# Patient Record
Sex: MALE | Race: BLACK OR AFRICAN AMERICAN | NOT HISPANIC OR LATINO | URBAN - METROPOLITAN AREA
[De-identification: names, ages, dates, MRNs, and addresses within clinical notes are randomized per-mention and may not be internally consistent; named-entity substitution may affect disease eponyms.]

---

## 2021-05-29 ENCOUNTER — INPATIENT (INPATIENT)
Facility: HOSPITAL | Age: 66
LOS: 0 days | Discharge: HOME | End: 2021-05-30
Attending: HOSPITALIST | Admitting: HOSPITALIST
Payer: COMMERCIAL

## 2021-05-29 VITALS
OXYGEN SATURATION: 98 % | WEIGHT: 199.96 LBS | HEIGHT: 69 IN | HEART RATE: 105 BPM | DIASTOLIC BLOOD PRESSURE: 121 MMHG | RESPIRATION RATE: 18 BRPM | SYSTOLIC BLOOD PRESSURE: 257 MMHG | TEMPERATURE: 98 F

## 2021-05-29 LAB
ALBUMIN SERPL ELPH-MCNC: 3.3 G/DL — SIGNIFICANT CHANGE UP (ref 3.3–5)
ALP SERPL-CCNC: 114 U/L — SIGNIFICANT CHANGE UP (ref 40–120)
ALT FLD-CCNC: 30 U/L — SIGNIFICANT CHANGE UP (ref 12–78)
ANION GAP SERPL CALC-SCNC: 4 MMOL/L — LOW (ref 5–17)
APPEARANCE UR: CLEAR — SIGNIFICANT CHANGE UP
AST SERPL-CCNC: 43 U/L — HIGH (ref 15–37)
BACTERIA # UR AUTO: ABNORMAL
BASOPHILS # BLD AUTO: 0.03 K/UL — SIGNIFICANT CHANGE UP (ref 0–0.2)
BASOPHILS NFR BLD AUTO: 0.5 % — SIGNIFICANT CHANGE UP (ref 0–2)
BILIRUB SERPL-MCNC: 0.4 MG/DL — SIGNIFICANT CHANGE UP (ref 0.2–1.2)
BILIRUB UR-MCNC: NEGATIVE — SIGNIFICANT CHANGE UP
BUN SERPL-MCNC: 43 MG/DL — HIGH (ref 7–23)
CALCIUM SERPL-MCNC: 8.3 MG/DL — LOW (ref 8.5–10.1)
CHLORIDE SERPL-SCNC: 105 MMOL/L — SIGNIFICANT CHANGE UP (ref 96–108)
CO2 SERPL-SCNC: 30 MMOL/L — SIGNIFICANT CHANGE UP (ref 22–31)
COLOR SPEC: YELLOW — SIGNIFICANT CHANGE UP
CREAT ?TM UR-MCNC: 49 MG/DL — SIGNIFICANT CHANGE UP
CREAT SERPL-MCNC: 3.3 MG/DL — HIGH (ref 0.5–1.3)
CREATININE, URINE RESULT: 47 MG/DL — SIGNIFICANT CHANGE UP
DIFF PNL FLD: ABNORMAL
EOSINOPHIL # BLD AUTO: 0.37 K/UL — SIGNIFICANT CHANGE UP (ref 0–0.5)
EOSINOPHIL NFR BLD AUTO: 6.7 % — HIGH (ref 0–6)
EPI CELLS # UR: SIGNIFICANT CHANGE UP
FLUAV AG NPH QL: SIGNIFICANT CHANGE UP
FLUBV AG NPH QL: SIGNIFICANT CHANGE UP
GLUCOSE BLDC GLUCOMTR-MCNC: 115 MG/DL — HIGH (ref 70–99)
GLUCOSE BLDC GLUCOMTR-MCNC: 131 MG/DL — HIGH (ref 70–99)
GLUCOSE BLDC GLUCOMTR-MCNC: 160 MG/DL — HIGH (ref 70–99)
GLUCOSE BLDC GLUCOMTR-MCNC: 207 MG/DL — HIGH (ref 70–99)
GLUCOSE BLDC GLUCOMTR-MCNC: 246 MG/DL — HIGH (ref 70–99)
GLUCOSE SERPL-MCNC: 268 MG/DL — HIGH (ref 70–99)
GLUCOSE UR QL: NEGATIVE MG/DL — SIGNIFICANT CHANGE UP
HCT VFR BLD CALC: 32.2 % — LOW (ref 39–50)
HGB BLD-MCNC: 10.6 G/DL — LOW (ref 13–17)
IMM GRANULOCYTES NFR BLD AUTO: 0.4 % — SIGNIFICANT CHANGE UP (ref 0–1.5)
KETONES UR-MCNC: NEGATIVE — SIGNIFICANT CHANGE UP
LEUKOCYTE ESTERASE UR-ACNC: NEGATIVE — SIGNIFICANT CHANGE UP
LIDOCAIN IGE QN: 157 U/L — SIGNIFICANT CHANGE UP (ref 73–393)
LYMPHOCYTES # BLD AUTO: 0.66 K/UL — LOW (ref 1–3.3)
LYMPHOCYTES # BLD AUTO: 12 % — LOW (ref 13–44)
MCHC RBC-ENTMCNC: 29 PG — SIGNIFICANT CHANGE UP (ref 27–34)
MCHC RBC-ENTMCNC: 32.9 GM/DL — SIGNIFICANT CHANGE UP (ref 32–36)
MCV RBC AUTO: 88.2 FL — SIGNIFICANT CHANGE UP (ref 80–100)
MONOCYTES # BLD AUTO: 0.26 K/UL — SIGNIFICANT CHANGE UP (ref 0–0.9)
MONOCYTES NFR BLD AUTO: 4.7 % — SIGNIFICANT CHANGE UP (ref 2–14)
NEUTROPHILS # BLD AUTO: 4.16 K/UL — SIGNIFICANT CHANGE UP (ref 1.8–7.4)
NEUTROPHILS NFR BLD AUTO: 75.7 % — SIGNIFICANT CHANGE UP (ref 43–77)
NITRITE UR-MCNC: NEGATIVE — SIGNIFICANT CHANGE UP
NRBC # BLD: 0 /100 WBCS — SIGNIFICANT CHANGE UP (ref 0–0)
NT-PROBNP SERPL-SCNC: 1005 PG/ML — HIGH (ref 0–125)
PH UR: 6 — SIGNIFICANT CHANGE UP (ref 5–8)
PLATELET # BLD AUTO: 186 K/UL — SIGNIFICANT CHANGE UP (ref 150–400)
POTASSIUM SERPL-MCNC: 4.8 MMOL/L — SIGNIFICANT CHANGE UP (ref 3.5–5.3)
POTASSIUM SERPL-SCNC: 4.8 MMOL/L — SIGNIFICANT CHANGE UP (ref 3.5–5.3)
PROT ?TM UR-MCNC: 104 MG/DL — HIGH (ref 0–12)
PROT ?TM UR-MCNC: 107 MG/DL — HIGH (ref 0–12)
PROT SERPL-MCNC: 6.7 G/DL — SIGNIFICANT CHANGE UP (ref 6–8.3)
PROT SERPL-MCNC: 6.7 G/DL — SIGNIFICANT CHANGE UP (ref 6–8.3)
PROT SERPL-MCNC: 7.7 GM/DL — SIGNIFICANT CHANGE UP (ref 6–8.3)
PROT UR-MCNC: 100 MG/DL
PROT/CREAT UR-RTO: 2.1 RATIO — HIGH (ref 0–0.2)
RBC # BLD: 3.65 M/UL — LOW (ref 4.2–5.8)
RBC # FLD: 12.7 % — SIGNIFICANT CHANGE UP (ref 10.3–14.5)
RBC CASTS # UR COMP ASSIST: ABNORMAL /HPF (ref 0–4)
SARS-COV-2 RNA SPEC QL NAA+PROBE: SIGNIFICANT CHANGE UP
SODIUM SERPL-SCNC: 139 MMOL/L — SIGNIFICANT CHANGE UP (ref 135–145)
SP GR SPEC: 1.01 — SIGNIFICANT CHANGE UP (ref 1.01–1.02)
TROPONIN I SERPL-MCNC: 0.1 NG/ML — HIGH (ref 0.01–0.04)
TROPONIN I SERPL-MCNC: 0.11 NG/ML — HIGH (ref 0.01–0.04)
TROPONIN I SERPL-MCNC: 0.11 NG/ML — HIGH (ref 0.01–0.04)
UROBILINOGEN FLD QL: NEGATIVE MG/DL — SIGNIFICANT CHANGE UP
WBC # BLD: 5.5 K/UL — SIGNIFICANT CHANGE UP (ref 3.8–10.5)
WBC # FLD AUTO: 5.5 K/UL — SIGNIFICANT CHANGE UP (ref 3.8–10.5)
WBC UR QL: SIGNIFICANT CHANGE UP

## 2021-05-29 PROCEDURE — 12345: CPT | Mod: NC

## 2021-05-29 PROCEDURE — 93010 ELECTROCARDIOGRAM REPORT: CPT

## 2021-05-29 PROCEDURE — 71045 X-RAY EXAM CHEST 1 VIEW: CPT | Mod: 26

## 2021-05-29 PROCEDURE — 76775 US EXAM ABDO BACK WALL LIM: CPT | Mod: 26

## 2021-05-29 PROCEDURE — 99291 CRITICAL CARE FIRST HOUR: CPT

## 2021-05-29 PROCEDURE — 93306 TTE W/DOPPLER COMPLETE: CPT | Mod: 26

## 2021-05-29 PROCEDURE — 99223 1ST HOSP IP/OBS HIGH 75: CPT

## 2021-05-29 RX ORDER — GLUCAGON INJECTION, SOLUTION 0.5 MG/.1ML
1 INJECTION, SOLUTION SUBCUTANEOUS ONCE
Refills: 0 | Status: DISCONTINUED | OUTPATIENT
Start: 2021-05-29 | End: 2021-05-30

## 2021-05-29 RX ORDER — AMLODIPINE BESYLATE 2.5 MG/1
10 TABLET ORAL DAILY
Refills: 0 | Status: DISCONTINUED | OUTPATIENT
Start: 2021-05-29 | End: 2021-05-30

## 2021-05-29 RX ORDER — INSULIN GLARGINE 100 [IU]/ML
10 INJECTION, SOLUTION SUBCUTANEOUS AT BEDTIME
Refills: 0 | Status: DISCONTINUED | OUTPATIENT
Start: 2021-05-29 | End: 2021-05-30

## 2021-05-29 RX ORDER — INSULIN LISPRO 100/ML
VIAL (ML) SUBCUTANEOUS AT BEDTIME
Refills: 0 | Status: DISCONTINUED | OUTPATIENT
Start: 2021-05-29 | End: 2021-05-30

## 2021-05-29 RX ORDER — HEPARIN SODIUM 5000 [USP'U]/ML
5000 INJECTION INTRAVENOUS; SUBCUTANEOUS EVERY 8 HOURS
Refills: 0 | Status: DISCONTINUED | OUTPATIENT
Start: 2021-05-29 | End: 2021-05-30

## 2021-05-29 RX ORDER — DEXTROSE 50 % IN WATER 50 %
25 SYRINGE (ML) INTRAVENOUS ONCE
Refills: 0 | Status: DISCONTINUED | OUTPATIENT
Start: 2021-05-29 | End: 2021-05-30

## 2021-05-29 RX ORDER — DEXTROSE 50 % IN WATER 50 %
15 SYRINGE (ML) INTRAVENOUS ONCE
Refills: 0 | Status: DISCONTINUED | OUTPATIENT
Start: 2021-05-29 | End: 2021-05-30

## 2021-05-29 RX ORDER — LABETALOL HCL 100 MG
10 TABLET ORAL ONCE
Refills: 0 | Status: COMPLETED | OUTPATIENT
Start: 2021-05-29 | End: 2021-05-29

## 2021-05-29 RX ORDER — INSULIN LISPRO 100/ML
VIAL (ML) SUBCUTANEOUS
Refills: 0 | Status: DISCONTINUED | OUTPATIENT
Start: 2021-05-29 | End: 2021-05-30

## 2021-05-29 RX ORDER — LOSARTAN POTASSIUM 100 MG/1
100 TABLET, FILM COATED ORAL DAILY
Refills: 0 | Status: DISCONTINUED | OUTPATIENT
Start: 2021-05-29 | End: 2021-05-29

## 2021-05-29 RX ORDER — HYDRALAZINE HCL 50 MG
10 TABLET ORAL ONCE
Refills: 0 | Status: COMPLETED | OUTPATIENT
Start: 2021-05-29 | End: 2021-05-29

## 2021-05-29 RX ORDER — DEXTROSE 50 % IN WATER 50 %
12.5 SYRINGE (ML) INTRAVENOUS ONCE
Refills: 0 | Status: DISCONTINUED | OUTPATIENT
Start: 2021-05-29 | End: 2021-05-30

## 2021-05-29 RX ORDER — LOSARTAN POTASSIUM 100 MG/1
300 TABLET, FILM COATED ORAL ONCE
Refills: 0 | Status: DISCONTINUED | OUTPATIENT
Start: 2021-05-29 | End: 2021-05-29

## 2021-05-29 RX ORDER — SODIUM CHLORIDE 9 MG/ML
1000 INJECTION, SOLUTION INTRAVENOUS
Refills: 0 | Status: DISCONTINUED | OUTPATIENT
Start: 2021-05-29 | End: 2021-05-30

## 2021-05-29 RX ORDER — LABETALOL HCL 100 MG
100 TABLET ORAL ONCE
Refills: 0 | Status: COMPLETED | OUTPATIENT
Start: 2021-05-29 | End: 2021-05-29

## 2021-05-29 RX ORDER — HYDRALAZINE HCL 50 MG
25 TABLET ORAL THREE TIMES A DAY
Refills: 0 | Status: DISCONTINUED | OUTPATIENT
Start: 2021-05-29 | End: 2021-05-30

## 2021-05-29 RX ORDER — FUROSEMIDE 40 MG
40 TABLET ORAL ONCE
Refills: 0 | Status: COMPLETED | OUTPATIENT
Start: 2021-05-29 | End: 2021-05-29

## 2021-05-29 RX ORDER — HYDROCHLOROTHIAZIDE 25 MG
12.5 TABLET ORAL ONCE
Refills: 0 | Status: COMPLETED | OUTPATIENT
Start: 2021-05-29 | End: 2021-05-29

## 2021-05-29 RX ADMIN — Medication 0.2 MILLIGRAM(S): at 04:07

## 2021-05-29 RX ADMIN — Medication 10 MILLIGRAM(S): at 02:59

## 2021-05-29 RX ADMIN — Medication 100 MILLIGRAM(S): at 17:23

## 2021-05-29 RX ADMIN — HEPARIN SODIUM 5000 UNIT(S): 5000 INJECTION INTRAVENOUS; SUBCUTANEOUS at 13:07

## 2021-05-29 RX ADMIN — Medication 10 MILLIGRAM(S): at 02:00

## 2021-05-29 RX ADMIN — Medication 10 MILLIGRAM(S): at 05:42

## 2021-05-29 RX ADMIN — AMLODIPINE BESYLATE 10 MILLIGRAM(S): 2.5 TABLET ORAL at 05:42

## 2021-05-29 RX ADMIN — HEPARIN SODIUM 5000 UNIT(S): 5000 INJECTION INTRAVENOUS; SUBCUTANEOUS at 21:20

## 2021-05-29 RX ADMIN — INSULIN GLARGINE 10 UNIT(S): 100 INJECTION, SOLUTION SUBCUTANEOUS at 21:21

## 2021-05-29 RX ADMIN — Medication 25 MILLIGRAM(S): at 21:20

## 2021-05-29 RX ADMIN — Medication 12.5 MILLIGRAM(S): at 02:33

## 2021-05-29 RX ADMIN — Medication 40 MILLIGRAM(S): at 03:01

## 2021-05-29 RX ADMIN — HEPARIN SODIUM 5000 UNIT(S): 5000 INJECTION INTRAVENOUS; SUBCUTANEOUS at 05:42

## 2021-05-29 NOTE — ED ADULT NURSE NOTE - NSIMPLEMENTINTERV_GEN_ALL_ED
Implemented All Universal Safety Interventions:  [place] to call system. Call bell, personal items and telephone within reach. Instruct patient to call for assistance. Room bathroom lighting operational. Non-slip footwear when patient is off stretcher. Physically safe environment: no spills, clutter or unnecessary equipment. Stretcher in lowest position, wheels locked, appropriate side rails in place.

## 2021-05-29 NOTE — ED ADULT NURSE NOTE - OBJECTIVE STATEMENT
Patient presents in ED complaining of high blood pressure intermittent headaches, low blood sugar. HX of taking first Covid shot x 1 week.

## 2021-05-29 NOTE — ED ADULT TRIAGE NOTE - CHIEF COMPLAINT QUOTE
As per EMS pt complained of low blood sugar, stating it was 55. Took BP at scene at noted systolic BP was 270 and 259. Pt denies headache, dizziness and blurred vision.

## 2021-05-29 NOTE — ED PROVIDER NOTE - CARE PLAN
Principal Discharge DX:	Hypertension, unspecified type   Principal Discharge DX:	Hypertension, unspecified type  Secondary Diagnosis:	MIESHA (acute kidney injury)

## 2021-05-29 NOTE — CONSULT NOTE ADULT - SUBJECTIVE AND OBJECTIVE BOX
HPI:    Patient is a 65y old  Male who presented to ED last night for management of hypoglycemia. Found to be in hypertensive urgency, .  In addition. initial BMP with Cr of 3.3. He denies CKD hx. Pos long standing diabetes and HTN. No NSAIDs. Denies difficulty voiding.   Of note, has not had blood work for over a year.         PAST MEDICAL & SURGICAL HISTORY:  Diabetes    HTN (hypertension)        Social Hx: not a smoker    FAMILY HISTORY: DM, HTN      Allergies    No Known Allergies            MEDICATIONS  (STANDING):  amLODIPine   Tablet 10 milliGRAM(s) Oral daily  dextrose 40% Gel 15 Gram(s) Oral once  dextrose 5%. 1000 milliLiter(s) (50 mL/Hr) IV Continuous <Continuous>  dextrose 5%. 1000 milliLiter(s) (100 mL/Hr) IV Continuous <Continuous>  dextrose 50% Injectable 25 Gram(s) IV Push once  dextrose 50% Injectable 12.5 Gram(s) IV Push once  dextrose 50% Injectable 25 Gram(s) IV Push once  glucagon  Injectable 1 milliGRAM(s) IntraMuscular once  heparin   Injectable 5000 Unit(s) SubCutaneous every 8 hours  insulin glargine Injectable (LANTUS) 10 Unit(s) SubCutaneous at bedtime  insulin lispro (ADMELOG) corrective regimen sliding scale   SubCutaneous three times a day before meals  insulin lispro (ADMELOG) corrective regimen sliding scale   SubCutaneous at bedtime    MEDICATIONS  (PRN):      Daily Height in cm: 154.68 (29 May 2021 05:17)    Daily Weight in k.4 (29 May 2021 05:17)    Drug Dosing Weight  Height (cm): 154.7 (29 May 2021 05:17)  Weight (kg): 92.4 (29 May 2021 05:17)  BMI (kg/m2): 38.6 (29 May 2021 05:17)  BSA (m2): 1.9 (29 May 2021 05:17)      REVIEW OF SYSTEMS:    Per HPI            I&O's Detail        PHYSICAL EXAM:    GENERAL: NAD  HEAD:  Atraumatic, normocephalic  EYES: EOMI, PERRLA. Conjunctiva and sclera clear  ENMT: moist mucous membranes.   NECK: Supple. No increase in JVP  NERVOUS SYSTEM:  Alert & Oriented X3. Motor Strength 5/5 B/L upper and lower extremities; DTRs 2+ intact and symmetric  CHEST/LUNG: Clear to auscultation bilaterally  HEART: Regular rate and rhythm. No murmurs, rubs, or gallops  ABDOMEN: Soft. No diastolic bruit  EXTREMITIES:  no edema      LABS:  CBC Full  -  ( 29 May 2021 01:37 )  WBC Count : 5.50 K/uL  RBC Count : 3.65 M/uL  Hemoglobin : 10.6 g/dL  Hematocrit : 32.2 %  Platelet Count - Automated : 186 K/uL  Mean Cell Volume : 88.2 fl  Mean Cell Hemoglobin : 29.0 pg  Mean Cell Hemoglobin Concentration : 32.9 gm/dL  Auto Neutrophil # : 4.16 K/uL  Auto Lymphocyte # : 0.66 K/uL  Auto Monocyte # : 0.26 K/uL  Auto Eosinophil # : 0.37 K/uL  Auto Basophil # : 0.03 K/uL  Auto Neutrophil % : 75.7 %  Auto Lymphocyte % : 12.0 %  Auto Monocyte % : 4.7 %  Auto Eosinophil % : 6.7 %  Auto Basophil % : 0.5 %        139  |  105  |  43<H>  ----------------------------<  268<H>  4.8   |  30  |  3.30<H>    Ca    8.3<L>      29 May 2021 01:37    TPro  7.7  /  Alb  3.3  /  TBili  0.4  /  DBili  x   /  AST  43<H>  /  ALT  30  /  AlkPhos  114  05-    CAPILLARY BLOOD GLUCOSE      POCT Blood Glucose.: 207 mg/dL (29 May 2021 07:54)        CARDIAC MARKERS ( 29 May 2021 01:37 )  .097 ng/mL / x     / x     / x     / x              Impression:  * Elevated Cr. ? MIESHA vs CKD  * Hypertensive urgency. Improved  * Brittle diabetic    Recommendations:   * UA, spot urine protein:cr  * Renal US, doppler, Rad/renin  * SPEP, UPEP  * If active urinary sediment, will obtain GN serologies  * Goal -170 next 48h    Thank you!

## 2021-05-29 NOTE — ED PROVIDER NOTE - OBJECTIVE STATEMENT
Pt is a 64 yo gentleman with a pmhx of NIDM, HTN who presents to the ED with hypertension. He felt nauseated, and checked his glucose which was low, so he drank juice, and then checked his bp which was elevated. Has been compliant with his bp medication. Just saw his PMD 2 days ago, and was switched from metoprolol to irbesartan 300 and hctz. No chest pain, no sob, no symptoms now.

## 2021-05-29 NOTE — H&P ADULT - ASSESSMENT
65 year old man with PMH HTN, DM2 presents to ED with HTN urgency.  States he felt nauseated at home and his BG was low so he ate and it improved.  He currently denies chest pain, SOB, palpitations, fever, chills, nausea, vomiting, diarrhea.  He seems surprised with his current work up as he saw his PMD 2 years ago and his kidney function and blood pressure were "normal".    HTN urgency:  Given labetalol and Lasix in ED with improvement in BP  Mild troponin and BNP elevation  Keep on tele  trend troponin  TTE  Cardiology consult if TTE abnormal    Abnormal renal fxn:  No baseline for comparison  Will get renal US  Send Pro/Cr ratio  Nephrology consult  Avoid nephrotoxic meds    DM2:  On Metformin and Glipizide at home; would stop Metformin given renal function  Sliding scale  Lantus 10 units SC QHS  CHO diet  Check A1C, cholesterol    Preventive:  Heparin SC q8h for DVT prophylaxis     65 year old man with PMH HTN, DM2 presents to ED with HTN urgency.  States he felt nauseated at home and his BG was low so he ate and it improved.  He currently denies chest pain, SOB, palpitations, fever, chills, nausea, vomiting, diarrhea.  He seems surprised with his current work up as he saw his PMD 2 years ago and his kidney function and blood pressure were "normal".    HTN urgency:  Given labetalol and Lasix in ED with improvement in BP  Mild troponin and BNP elevation  Keep on tele  trend troponin  TTE  Cardiology consult if TTE abnormal  On ARB/HCTZ at home; would discontinue and start Norvasc, might need second medication if BP still uncontrolled.    Abnormal renal fxn:  No baseline for comparison  Will get renal US  Send Pro/Cr ratio  Nephrology consult  Avoid nephrotoxic meds    DM2:  On Metformin and Glipizide at home; would stop Metformin given renal function  Sliding scale  Lantus 10 units SC QHS  CHO diet  Check A1C, cholesterol    Preventive:  Heparin SC q8h for DVT prophylaxis

## 2021-05-29 NOTE — H&P ADULT - NSHPPHYSICALEXAM_GEN_ALL_CORE
GENERAL: NAD, well-groomed, well-developed  HEAD:  Atraumatic, Normocephalic  EYES: EOMI, PERRLA, conjunctiva and sclera clear  ENMT: No tonsillar erythema, exudates, or enlargement; Moist mucous membranes, No lesions  NECK: Supple, No JVD, Normal thyroid  NERVOUS SYSTEM:  Alert & Oriented X3, Good concentration  CHEST/LUNG: Clear to percussion bilaterally; No rales, rhonchi, wheezing, or rubs  HEART: Regular rate and rhythm; No murmurs, rubs, or gallops  ABDOMEN: Soft, Nontender, Nondistended; Bowel sounds present  EXTREMITIES: No clubbing, cyanosis, or edema  SKIN: no rashes or lesions  PSYCH: normal affect and behavior

## 2021-05-29 NOTE — CHART NOTE - NSCHARTNOTEFT_GEN_A_CORE
Patient seen and examined will get echo   consider cardiology   feel that tropin are demand and influenced by renal failure

## 2021-05-29 NOTE — H&P ADULT - NSHPLABSRESULTS_GEN_ALL_CORE
Vital Signs Last 24 Hrs  T(C): 36.8 (29 May 2021 00:40), Max: 36.8 (29 May 2021 00:40)  T(F): 98.2 (29 May 2021 00:40), Max: 98.2 (29 May 2021 00:40)  HR: 88 (29 May 2021 02:49) (88 - 105)  BP: 196/100 (29 May 2021 02:49) (196/100 - 257/121)  BP(mean): --  RR: 18 (29 May 2021 00:40) (18 - 18)  SpO2: 98% (29 May 2021 00:40) (98% - 98%)          LABS:                        10.6   5.50  )-----------( 186      ( 29 May 2021 01:37 )             32.2     05-29    139  |  105  |  43<H>  ----------------------------<  268<H>  4.8   |  30  |  3.30<H>    Ca    8.3<L>      29 May 2021 01:37    TPro  7.7  /  Alb  3.3  /  TBili  0.4  /  DBili  x   /  AST  43<H>  /  ALT  30  /  AlkPhos  114  05-29

## 2021-05-29 NOTE — ED ADULT TRIAGE NOTE - NS ED NURSE DIRECT TO ROOM YN
Ongoing (interventions implemented as appropriate)  Pt is alert and oriented.   VSS  Pt able to make needs known.  Pt remained afebrile throughout this shift.   Pt remained free of falls this shift.   Pt denies pain this shift.  Plan of care reviewed. Patient verbalizes understanding.   Pt moving/turing independent. Frequent weight shifting encouraged.  Patient sinus rhythm on monitor.   Bed low, side rails up x 2, wheels locked, call light in reach.   Hourly rounding completed.   Will continue to monitor.    No

## 2021-05-29 NOTE — H&P ADULT - HISTORY OF PRESENT ILLNESS
65 year old man with PMH HTN, DM2 presents to ED with HTN urgency.  States he felt nauseated at home and his BG was low so he ate and it improved.  He currently denies chest pain, SOB, palpitations, fever, chills, nausea, vomiting, diarrhea.  He seems surprised with his current work up as he saw his PMD 2 years ago and his kidney function and blood pressure were "normal".    In the ED, BP to the 200s systolic.  Cr 3.30 with no baseline, mild troponin and BNP elevation.

## 2021-05-30 ENCOUNTER — TRANSCRIPTION ENCOUNTER (OUTPATIENT)
Age: 66
End: 2021-05-30

## 2021-05-30 VITALS
HEART RATE: 87 BPM | SYSTOLIC BLOOD PRESSURE: 168 MMHG | RESPIRATION RATE: 18 BRPM | DIASTOLIC BLOOD PRESSURE: 81 MMHG | TEMPERATURE: 98 F | OXYGEN SATURATION: 100 %

## 2021-05-30 LAB
A1C WITH ESTIMATED AVERAGE GLUCOSE RESULT: 6.7 % — HIGH (ref 4–5.6)
ALBUMIN SERPL ELPH-MCNC: 2.9 G/DL — LOW (ref 3.3–5)
ALP SERPL-CCNC: 89 U/L — SIGNIFICANT CHANGE UP (ref 40–120)
ALT FLD-CCNC: 23 U/L — SIGNIFICANT CHANGE UP (ref 12–78)
ANION GAP SERPL CALC-SCNC: 6 MMOL/L — SIGNIFICANT CHANGE UP (ref 5–17)
AST SERPL-CCNC: 20 U/L — SIGNIFICANT CHANGE UP (ref 15–37)
BASOPHILS # BLD AUTO: 0.04 K/UL — SIGNIFICANT CHANGE UP (ref 0–0.2)
BASOPHILS NFR BLD AUTO: 0.9 % — SIGNIFICANT CHANGE UP (ref 0–2)
BILIRUB SERPL-MCNC: 0.6 MG/DL — SIGNIFICANT CHANGE UP (ref 0.2–1.2)
BUN SERPL-MCNC: 40 MG/DL — HIGH (ref 7–23)
CALCIUM SERPL-MCNC: 8.6 MG/DL — SIGNIFICANT CHANGE UP (ref 8.5–10.1)
CHLORIDE SERPL-SCNC: 109 MMOL/L — HIGH (ref 96–108)
CHOLEST SERPL-MCNC: 168 MG/DL — SIGNIFICANT CHANGE UP
CO2 SERPL-SCNC: 26 MMOL/L — SIGNIFICANT CHANGE UP (ref 22–31)
COVID-19 SPIKE DOMAIN AB INTERP: NEGATIVE — SIGNIFICANT CHANGE UP
COVID-19 SPIKE DOMAIN ANTIBODY RESULT: 0.4 U/ML — SIGNIFICANT CHANGE UP
CREAT SERPL-MCNC: 3.01 MG/DL — HIGH (ref 0.5–1.3)
EOSINOPHIL # BLD AUTO: 0.69 K/UL — HIGH (ref 0–0.5)
EOSINOPHIL NFR BLD AUTO: 15.2 % — HIGH (ref 0–6)
ESTIMATED AVERAGE GLUCOSE: 146 MG/DL — HIGH (ref 68–114)
GLUCOSE BLDC GLUCOMTR-MCNC: 106 MG/DL — HIGH (ref 70–99)
GLUCOSE BLDC GLUCOMTR-MCNC: 115 MG/DL — HIGH (ref 70–99)
GLUCOSE BLDC GLUCOMTR-MCNC: 123 MG/DL — HIGH (ref 70–99)
GLUCOSE SERPL-MCNC: 103 MG/DL — HIGH (ref 70–99)
HCT VFR BLD CALC: 32.9 % — LOW (ref 39–50)
HCV AB S/CO SERPL IA: 0.18 S/CO — SIGNIFICANT CHANGE UP (ref 0–0.99)
HCV AB SERPL-IMP: SIGNIFICANT CHANGE UP
HDLC SERPL-MCNC: 57 MG/DL — SIGNIFICANT CHANGE UP
HGB BLD-MCNC: 10.5 G/DL — LOW (ref 13–17)
IMM GRANULOCYTES NFR BLD AUTO: 0.2 % — SIGNIFICANT CHANGE UP (ref 0–1.5)
LIPID PNL WITH DIRECT LDL SERPL: 94 MG/DL — SIGNIFICANT CHANGE UP
LYMPHOCYTES # BLD AUTO: 1.14 K/UL — SIGNIFICANT CHANGE UP (ref 1–3.3)
LYMPHOCYTES # BLD AUTO: 25.2 % — SIGNIFICANT CHANGE UP (ref 13–44)
MAGNESIUM SERPL-MCNC: 2 MG/DL — SIGNIFICANT CHANGE UP (ref 1.6–2.6)
MCHC RBC-ENTMCNC: 28.6 PG — SIGNIFICANT CHANGE UP (ref 27–34)
MCHC RBC-ENTMCNC: 31.9 GM/DL — LOW (ref 32–36)
MCV RBC AUTO: 89.6 FL — SIGNIFICANT CHANGE UP (ref 80–100)
MONOCYTES # BLD AUTO: 0.28 K/UL — SIGNIFICANT CHANGE UP (ref 0–0.9)
MONOCYTES NFR BLD AUTO: 6.2 % — SIGNIFICANT CHANGE UP (ref 2–14)
NEUTROPHILS # BLD AUTO: 2.37 K/UL — SIGNIFICANT CHANGE UP (ref 1.8–7.4)
NEUTROPHILS NFR BLD AUTO: 52.3 % — SIGNIFICANT CHANGE UP (ref 43–77)
NON HDL CHOLESTEROL: 111 MG/DL — SIGNIFICANT CHANGE UP
NRBC # BLD: 0 /100 WBCS — SIGNIFICANT CHANGE UP (ref 0–0)
PHOSPHATE SERPL-MCNC: 4.5 MG/DL — SIGNIFICANT CHANGE UP (ref 2.5–4.5)
PLATELET # BLD AUTO: 164 K/UL — SIGNIFICANT CHANGE UP (ref 150–400)
POTASSIUM SERPL-MCNC: 4 MMOL/L — SIGNIFICANT CHANGE UP (ref 3.5–5.3)
POTASSIUM SERPL-SCNC: 4 MMOL/L — SIGNIFICANT CHANGE UP (ref 3.5–5.3)
PROT SERPL-MCNC: 6.9 GM/DL — SIGNIFICANT CHANGE UP (ref 6–8.3)
RBC # BLD: 3.67 M/UL — LOW (ref 4.2–5.8)
RBC # FLD: 12.9 % — SIGNIFICANT CHANGE UP (ref 10.3–14.5)
SARS-COV-2 IGG+IGM SERPL QL IA: 0.4 U/ML — SIGNIFICANT CHANGE UP
SARS-COV-2 IGG+IGM SERPL QL IA: NEGATIVE — SIGNIFICANT CHANGE UP
SODIUM SERPL-SCNC: 141 MMOL/L — SIGNIFICANT CHANGE UP (ref 135–145)
TRIGL SERPL-MCNC: 82 MG/DL — SIGNIFICANT CHANGE UP
TROPONIN I SERPL-MCNC: 0.1 NG/ML — HIGH (ref 0.01–0.04)
WBC # BLD: 4.53 K/UL — SIGNIFICANT CHANGE UP (ref 3.8–10.5)
WBC # FLD AUTO: 4.53 K/UL — SIGNIFICANT CHANGE UP (ref 3.8–10.5)

## 2021-05-30 PROCEDURE — 93975 VASCULAR STUDY: CPT | Mod: 26

## 2021-05-30 PROCEDURE — 99239 HOSP IP/OBS DSCHRG MGMT >30: CPT

## 2021-05-30 RX ORDER — HYDRALAZINE HCL 50 MG
1 TABLET ORAL
Qty: 90 | Refills: 0
Start: 2021-05-30 | End: 2021-06-28

## 2021-05-30 RX ORDER — IRBESARTAN AND HYDROCHLOROTHIAZIDE 12.5; 3 MG/1; MG/1
1 TABLET ORAL
Qty: 0 | Refills: 0 | DISCHARGE

## 2021-05-30 RX ORDER — AMLODIPINE BESYLATE 2.5 MG/1
1 TABLET ORAL
Qty: 30 | Refills: 0
Start: 2021-05-30 | End: 2021-06-28

## 2021-05-30 RX ADMIN — HEPARIN SODIUM 5000 UNIT(S): 5000 INJECTION INTRAVENOUS; SUBCUTANEOUS at 05:11

## 2021-05-30 RX ADMIN — HEPARIN SODIUM 5000 UNIT(S): 5000 INJECTION INTRAVENOUS; SUBCUTANEOUS at 13:15

## 2021-05-30 RX ADMIN — AMLODIPINE BESYLATE 10 MILLIGRAM(S): 2.5 TABLET ORAL at 05:05

## 2021-05-30 RX ADMIN — Medication 25 MILLIGRAM(S): at 05:05

## 2021-05-30 RX ADMIN — Medication 25 MILLIGRAM(S): at 13:15

## 2021-05-30 NOTE — DISCHARGE NOTE PROVIDER - HOSPITAL COURSE
HPI:  65 year old man with PMH HTN, DM2 presents to ED with HTN urgency.  States he felt nauseated at home and his BG was low so he ate and it improved.  He currently denies chest pain, SOB, palpitations, fever, chills, nausea, vomiting, diarrhea.  He seems surprised with his current work up as he saw his PMD 2 years ago and his kidney function and blood pressure were "normal".    In the ED, BP to the 200s systolic.  Cr 3.30 with no baseline, mild troponin and BNP elevation. (29 May 2021 03:22)    Patient had somne improvement in BP and was placed on Norvasc and Hydralazine     patient was urinating freely and creatnine begain to come down   ECHO was completed     Pacient was discharged home

## 2021-05-30 NOTE — DISCHARGE NOTE PROVIDER - NSDCCPCAREPLAN_GEN_ALL_CORE_FT
PRINCIPAL DISCHARGE DIAGNOSIS  Diagnosis: Hypertension, unspecified type  Assessment and Plan of Treatment: Please see your primary doctor and take your blood pressure medications      SECONDARY DISCHARGE DIAGNOSES  Diagnosis: MIESHA (acute kidney injury)  Assessment and Plan of Treatment: improved will need to follow kidney function

## 2021-05-30 NOTE — PROGRESS NOTE ADULT - SUBJECTIVE AND OBJECTIVE BOX
Subjective: no complaints.       MEDICATIONS  (STANDING):  amLODIPine   Tablet 10 milliGRAM(s) Oral daily  dextrose 40% Gel 15 Gram(s) Oral once  dextrose 5%. 1000 milliLiter(s) (50 mL/Hr) IV Continuous <Continuous>  dextrose 5%. 1000 milliLiter(s) (100 mL/Hr) IV Continuous <Continuous>  dextrose 50% Injectable 25 Gram(s) IV Push once  dextrose 50% Injectable 12.5 Gram(s) IV Push once  dextrose 50% Injectable 25 Gram(s) IV Push once  glucagon  Injectable 1 milliGRAM(s) IntraMuscular once  heparin   Injectable 5000 Unit(s) SubCutaneous every 8 hours  hydrALAZINE 25 milliGRAM(s) Oral three times a day  insulin glargine Injectable (LANTUS) 10 Unit(s) SubCutaneous at bedtime  insulin lispro (ADMELOG) corrective regimen sliding scale   SubCutaneous three times a day before meals  insulin lispro (ADMELOG) corrective regimen sliding scale   SubCutaneous at bedtime    MEDICATIONS  (PRN):          T(C): 36.8 (21 @ 04:54), Max: 36.9 (21 @ 00:00)  HR: 71 (21 @ 07:00) (52 - 83)  BP: 153/74 (21 @ 04:54) (153/74 - 192/93)  RR: 18 (21 @ 04:54) (18 - 18)  SpO2: 100% (21 @ 04:54) (97% - 100%)  Wt(kg): --        I&O's Detail    29 May 2021 07:01  -  30 May 2021 07:00  --------------------------------------------------------  IN:  Total IN: 0 mL    OUT:    Voided (mL): 750 mL  Total OUT: 750 mL    Total NET: -750 mL               PHYSICAL EXAM:    GENERAL: NAD  NECK: Supple, no inc in JVP  CHEST/LUNG: Clear  HEART: S1S2  ABDOMEN: Soft, No diastolic bruit  EXTREMITIES:  no edema  NEURO: no asterixis      LABS:  CBC Full  -  ( 30 May 2021 07:34 )  WBC Count : 4.53 K/uL  RBC Count : 3.67 M/uL  Hemoglobin : 10.5 g/dL  Hematocrit : 32.9 %  Platelet Count - Automated : 164 K/uL  Mean Cell Volume : 89.6 fl  Mean Cell Hemoglobin : 28.6 pg  Mean Cell Hemoglobin Concentration : 31.9 gm/dL  Auto Neutrophil # : 2.37 K/uL  Auto Lymphocyte # : 1.14 K/uL  Auto Monocyte # : 0.28 K/uL  Auto Eosinophil # : 0.69 K/uL  Auto Basophil # : 0.04 K/uL  Auto Neutrophil % : 52.3 %  Auto Lymphocyte % : 25.2 %  Auto Monocyte % : 6.2 %  Auto Eosinophil % : 15.2 %  Auto Basophil % : 0.9 %    05-    141  |  109<H>  |  40<H>  ----------------------------<  103<H>  4.0   |  26  |  3.01<H>    Ca    8.6      30 May 2021 07:34  Phos  4.5     05-  Mg     2.0         TPro  6.9  /  Alb  2.9<L>  /  TBili  0.6  /  DBili  x   /  AST  20  /  ALT  23  /  AlkPhos  89  05-30      Urinalysis Basic - ( 29 May 2021 14:33 )    Color: Yellow / Appearance: Clear / S.010 / pH: x  Gluc: x / Ketone: Negative  / Bili: Negative / Urobili: Negative mg/dL   Blood: x / Protein: 100 mg/dL / Nitrite: Negative   Leuk Esterase: Negative / RBC: 3-5 /HPF / WBC 0-2   Sq Epi: x / Non Sq Epi: Occasional / Bacteria: Occasional          Impression:  * Elevated Cr. ? MIESHA vs CKD  * Hypertensive urgency. Improved  * Brittle diabetic    Recommendations:   * pot urine protein:cr  * Renal arterial doppler, Rad/renin  * SPEP, UPEP, GN serologies  * Goal -160 next 48h

## 2021-05-30 NOTE — DISCHARGE NOTE PROVIDER - NSDCMRMEDTOKEN_GEN_ALL_CORE_FT
amLODIPine 10 mg oral tablet: 1 tab(s) orally once a day  hydrALAZINE 25 mg oral tablet: 1 tab(s) orally 3 times a day  metFORMIN 500 mg oral tablet: 1 tab(s) orally 2 times a day

## 2021-05-30 NOTE — DISCHARGE NOTE NURSING/CASE MANAGEMENT/SOCIAL WORK - PATIENT PORTAL LINK FT
You can access the FollowMyHealth Patient Portal offered by [facility] by registering at the following website: [email or web address]. By joining [employer]’s FollowMyHealth portal, you will also be able to view your health information using other applications (apps) compatible with our system.

## 2021-05-31 LAB
% ALBUMIN: 50.5 % — SIGNIFICANT CHANGE UP
% ALPHA 1: 4.3 % — SIGNIFICANT CHANGE UP
% ALPHA 2: 11.6 % — SIGNIFICANT CHANGE UP
% BETA: 12.1 % — SIGNIFICANT CHANGE UP
% GAMMA: 21.5 % — SIGNIFICANT CHANGE UP
% M SPIKE: SIGNIFICANT CHANGE UP
ALBUMIN SERPL ELPH-MCNC: 3.4 G/DL — LOW (ref 3.6–5.5)
ALBUMIN/GLOB SERPL ELPH: 1 RATIO — SIGNIFICANT CHANGE UP
ALPHA1 GLOB SERPL ELPH-MCNC: 0.3 G/DL — SIGNIFICANT CHANGE UP (ref 0.1–0.4)
ALPHA2 GLOB SERPL ELPH-MCNC: 0.8 G/DL — SIGNIFICANT CHANGE UP (ref 0.5–1)
B-GLOBULIN SERPL ELPH-MCNC: 0.8 G/DL — SIGNIFICANT CHANGE UP (ref 0.5–1)
C3 SERPL-MCNC: 118 MG/DL — SIGNIFICANT CHANGE UP (ref 81–157)
C4 SERPL-MCNC: 28 MG/DL — SIGNIFICANT CHANGE UP (ref 13–39)
GAMMA GLOBULIN: 1.4 G/DL — SIGNIFICANT CHANGE UP (ref 0.6–1.6)
INTERPRETATION SERPL IFE-IMP: SIGNIFICANT CHANGE UP
M-SPIKE: SIGNIFICANT CHANGE UP (ref 0–0)
PROT PATTERN SERPL ELPH-IMP: SIGNIFICANT CHANGE UP

## 2021-06-01 LAB
ALDOLASE SERPL-CCNC: 9.8 U/L — SIGNIFICANT CHANGE UP (ref 3.3–10.3)
MYELOPEROXIDASE AB SER-ACNC: <5 UNITS — SIGNIFICANT CHANGE UP
MYELOPEROXIDASE CELLS FLD QL: NEGATIVE — SIGNIFICANT CHANGE UP
PROTEINASE3 AB FLD-ACNC: <5 UNITS — SIGNIFICANT CHANGE UP
PROTEINASE3 AB SER-ACNC: NEGATIVE — SIGNIFICANT CHANGE UP

## 2021-06-03 LAB
% GAMMA, URINE: 9.7 % — SIGNIFICANT CHANGE UP
ALBUMIN 24H MFR UR ELPH: 69.9 % — SIGNIFICANT CHANGE UP
ALPHA1 GLOB 24H MFR UR ELPH: 7.7 % — SIGNIFICANT CHANGE UP
ALPHA2 GLOB 24H MFR UR ELPH: 4.3 % — SIGNIFICANT CHANGE UP
B-GLOBULIN 24H MFR UR ELPH: 8.4 % — SIGNIFICANT CHANGE UP
GBM IGG SER-ACNC: 3 — SIGNIFICANT CHANGE UP (ref 0–20)
INTERPRETATION 24H UR IFE-IMP: SIGNIFICANT CHANGE UP
INTERPRETATION 24H UR IFE-IMP: SIGNIFICANT CHANGE UP
M PROTEIN 24H UR ELPH-MRATE: SIGNIFICANT CHANGE UP
PROT ?TM UR-MCNC: 107 MG/DL — HIGH (ref 0–12)
PROT PATTERN 24H UR ELPH-IMP: SIGNIFICANT CHANGE UP
RENIN PLAS-CCNC: 0.38 NG/ML/HR — SIGNIFICANT CHANGE UP (ref 0.17–5.38)
TOTAL VOLUME - 24 HOUR: SIGNIFICANT CHANGE UP ML
URINE CREATININE CALCULATION: SIGNIFICANT CHANGE UP G/24 H (ref 1–2)

## 2021-06-09 DIAGNOSIS — E11.9 TYPE 2 DIABETES MELLITUS WITHOUT COMPLICATIONS: ICD-10-CM

## 2021-06-09 DIAGNOSIS — I16.0 HYPERTENSIVE URGENCY: ICD-10-CM

## 2021-06-09 DIAGNOSIS — N17.9 ACUTE KIDNEY FAILURE, UNSPECIFIED: ICD-10-CM

## 2021-06-09 DIAGNOSIS — R77.8 OTHER SPECIFIED ABNORMALITIES OF PLASMA PROTEINS: ICD-10-CM

## 2022-01-10 ENCOUNTER — EMERGENCY (EMERGENCY)
Facility: HOSPITAL | Age: 67
LOS: 0 days | Discharge: LEFT BEFORE DISCHARGE | End: 2022-01-10
Attending: STUDENT IN AN ORGANIZED HEALTH CARE EDUCATION/TRAINING PROGRAM
Payer: MEDICARE

## 2022-01-10 VITALS
RESPIRATION RATE: 20 BRPM | HEIGHT: 69 IN | HEART RATE: 99 BPM | TEMPERATURE: 99 F | OXYGEN SATURATION: 97 % | DIASTOLIC BLOOD PRESSURE: 84 MMHG | SYSTOLIC BLOOD PRESSURE: 155 MMHG | WEIGHT: 192.02 LBS

## 2022-01-10 DIAGNOSIS — E11.9 TYPE 2 DIABETES MELLITUS WITHOUT COMPLICATIONS: ICD-10-CM

## 2022-01-10 DIAGNOSIS — I10 ESSENTIAL (PRIMARY) HYPERTENSION: ICD-10-CM

## 2022-01-10 DIAGNOSIS — U07.1 COVID-19: ICD-10-CM

## 2022-01-10 DIAGNOSIS — Z79.84 LONG TERM (CURRENT) USE OF ORAL HYPOGLYCEMIC DRUGS: ICD-10-CM

## 2022-01-10 DIAGNOSIS — R07.0 PAIN IN THROAT: ICD-10-CM

## 2022-01-10 DIAGNOSIS — N17.9 ACUTE KIDNEY FAILURE, UNSPECIFIED: ICD-10-CM

## 2022-01-10 LAB
ALBUMIN SERPL ELPH-MCNC: 2.4 G/DL — LOW (ref 3.3–5)
ALP SERPL-CCNC: 60 U/L — SIGNIFICANT CHANGE UP (ref 40–120)
ALT FLD-CCNC: 29 U/L — SIGNIFICANT CHANGE UP (ref 12–78)
ANION GAP SERPL CALC-SCNC: 10 MMOL/L — SIGNIFICANT CHANGE UP (ref 5–17)
AST SERPL-CCNC: 37 U/L — SIGNIFICANT CHANGE UP (ref 15–37)
BASOPHILS # BLD AUTO: 0.04 K/UL — SIGNIFICANT CHANGE UP (ref 0–0.2)
BASOPHILS NFR BLD AUTO: 0.6 % — SIGNIFICANT CHANGE UP (ref 0–2)
BILIRUB SERPL-MCNC: 0.5 MG/DL — SIGNIFICANT CHANGE UP (ref 0.2–1.2)
BUN SERPL-MCNC: 83 MG/DL — HIGH (ref 7–23)
CALCIUM SERPL-MCNC: 9 MG/DL — SIGNIFICANT CHANGE UP (ref 8.5–10.1)
CHLORIDE SERPL-SCNC: 103 MMOL/L — SIGNIFICANT CHANGE UP (ref 96–108)
CO2 SERPL-SCNC: 23 MMOL/L — SIGNIFICANT CHANGE UP (ref 22–31)
CREAT SERPL-MCNC: 7.34 MG/DL — HIGH (ref 0.5–1.3)
EOSINOPHIL # BLD AUTO: 0.28 K/UL — SIGNIFICANT CHANGE UP (ref 0–0.5)
EOSINOPHIL NFR BLD AUTO: 4.1 % — SIGNIFICANT CHANGE UP (ref 0–6)
FLUAV AG NPH QL: SIGNIFICANT CHANGE UP
FLUBV AG NPH QL: SIGNIFICANT CHANGE UP
GLUCOSE SERPL-MCNC: 137 MG/DL — HIGH (ref 70–99)
HCT VFR BLD CALC: 28.6 % — LOW (ref 39–50)
HGB BLD-MCNC: 9.2 G/DL — LOW (ref 13–17)
IMM GRANULOCYTES NFR BLD AUTO: 0.4 % — SIGNIFICANT CHANGE UP (ref 0–1.5)
LYMPHOCYTES # BLD AUTO: 0.92 K/UL — LOW (ref 1–3.3)
LYMPHOCYTES # BLD AUTO: 13.6 % — SIGNIFICANT CHANGE UP (ref 13–44)
MCHC RBC-ENTMCNC: 28 PG — SIGNIFICANT CHANGE UP (ref 27–34)
MCHC RBC-ENTMCNC: 32.2 GM/DL — SIGNIFICANT CHANGE UP (ref 32–36)
MCV RBC AUTO: 86.9 FL — SIGNIFICANT CHANGE UP (ref 80–100)
MONOCYTES # BLD AUTO: 0.36 K/UL — SIGNIFICANT CHANGE UP (ref 0–0.9)
MONOCYTES NFR BLD AUTO: 5.3 % — SIGNIFICANT CHANGE UP (ref 2–14)
NEUTROPHILS # BLD AUTO: 5.14 K/UL — SIGNIFICANT CHANGE UP (ref 1.8–7.4)
NEUTROPHILS NFR BLD AUTO: 76 % — SIGNIFICANT CHANGE UP (ref 43–77)
NRBC # BLD: 0 /100 WBCS — SIGNIFICANT CHANGE UP (ref 0–0)
PLATELET # BLD AUTO: 194 K/UL — SIGNIFICANT CHANGE UP (ref 150–400)
POTASSIUM SERPL-MCNC: 4.7 MMOL/L — SIGNIFICANT CHANGE UP (ref 3.5–5.3)
POTASSIUM SERPL-SCNC: 4.7 MMOL/L — SIGNIFICANT CHANGE UP (ref 3.5–5.3)
PROT SERPL-MCNC: 8.7 GM/DL — HIGH (ref 6–8.3)
RBC # BLD: 3.29 M/UL — LOW (ref 4.2–5.8)
RBC # FLD: 12.4 % — SIGNIFICANT CHANGE UP (ref 10.3–14.5)
SARS-COV-2 RNA SPEC QL NAA+PROBE: DETECTED
SODIUM SERPL-SCNC: 136 MMOL/L — SIGNIFICANT CHANGE UP (ref 135–145)
WBC # BLD: 6.77 K/UL — SIGNIFICANT CHANGE UP (ref 3.8–10.5)
WBC # FLD AUTO: 6.77 K/UL — SIGNIFICANT CHANGE UP (ref 3.8–10.5)

## 2022-01-10 PROCEDURE — 70360 X-RAY EXAM OF NECK: CPT | Mod: 26

## 2022-01-10 PROCEDURE — 71045 X-RAY EXAM CHEST 1 VIEW: CPT | Mod: 26

## 2022-01-10 PROCEDURE — 99285 EMERGENCY DEPT VISIT HI MDM: CPT | Mod: CS

## 2022-01-10 RX ADMIN — Medication 100 MILLIGRAM(S): at 14:56

## 2022-01-10 NOTE — ED PROVIDER NOTE - CARE PLAN
1 Principal Discharge DX:	COVID-19   Principal Discharge DX:	COVID-19  Secondary Diagnosis:	Acute renal failure

## 2022-01-10 NOTE — ED ADULT NURSE NOTE - CAS DISCH ACCOMP BY
Patient has been feeling some contractions on and off.  Baby is moving well.  She is unsure if she wants to do an induction this Saturday.    Cervix is 2 cm 70% and -2 station.  Cervix is very anterior  Blood pressure 108/70 with trace protein  Doppler heart tones are positive and fundal height is appropriate.    Assessment-38 weeks 5 days  Offered induction of labor and discussed benefits of induction of labor at 39 weeks.  Patient will discuss with her  and call back.  If she does not get into she will follow-up here on Monday.  Encourage kick counts.  
Self

## 2022-01-10 NOTE — ED ADULT TRIAGE NOTE - CHIEF COMPLAINT QUOTE
cough since Friday, abnormal kidney results and feels he swallowed aluminum foil paper x 3 months and has throat irritation

## 2022-01-10 NOTE — ED PROVIDER NOTE - OBJECTIVE STATEMENT
right sided throat pain , feels he swallowed a piece of aluminum while eating  sandwich, cough which keeps him up, chest pain with cough, no fever, poor appetite right sided throat pain , feels he swallowed a piece of aluminum while eating  sandwich, cough which keeps him up, chest pain with cough, no fever, poor appetite, 66 year old male presents today c/o right sided throat pain , feels he swallowed a piece of aluminum while eating  sandwich, cough which keeps him up, chest pain with cough, no fever, poor appetite,

## 2022-01-10 NOTE — ED PROVIDER NOTE - PROGRESS NOTE DETAILS
pt states that he was sent in by his pmd for abnormally high kidney numbers and he was sent in to get it evaluated, labs drawn pt's creatinine is 7.34 last creatinine was 3.01, pt told he would need admission for workup, however, pt states that he does not want to stay, wants to go to his doctor.      The patient has decided to leave against medical advice.  The patient is AAOx3, not intoxicated, and displays normal decision making ability. We discussed all risks, benefits, and alternatives to the progression of treatment and the potential dangers of leaving including but not limited to permanent disability, injury, and death.  The patient was instructed that they are welcome to change their decision to leave against medical advice and return to the emergency department at any time and for any reason in order to allow us to render care.

## 2022-01-10 NOTE — ED PROVIDER NOTE - WET READ LAUNCH FT
delivery, delivered, current hospitalization There are 2 Wet Read(s) to document. There is 1 Wet Read(s) to document. There are no Wet Read(s) to document.

## 2022-01-10 NOTE — ED PROVIDER NOTE - PATIENT PORTAL LINK FT
You can access the FollowMyHealth Patient Portal offered by [facility] by registering at the following website: [email or web address]. By joining [employer]’s FollowMyHealth portal, you will also be able to view your health information using other applications (apps) compatible with our system. You can access the FollowMyHealth Patient Portal offered by [facility] by registering at the following website: [email or web address]. By joining [employer]’s FollowMyHealth portal, you will also be able to view your health information using other applications (apps) compatible with our system.

## 2022-01-10 NOTE — ED ADULT NURSE NOTE - OBJECTIVE STATEMENT
pt states he has cough since friday sore throat pain on the chest when coughing, loss of appetite since yesterday. pt also states he accidentally swallowed aluminum foil when he ate burger that feels stuck to his throat. denies fever, abdominal pain, diarrhea, sob.

## 2022-01-17 ENCOUNTER — INPATIENT (INPATIENT)
Facility: HOSPITAL | Age: 67
LOS: 1 days | Discharge: HOME | End: 2022-01-19
Attending: INTERNAL MEDICINE | Admitting: INTERNAL MEDICINE
Payer: MEDICARE

## 2022-01-17 VITALS
RESPIRATION RATE: 18 BRPM | SYSTOLIC BLOOD PRESSURE: 182 MMHG | WEIGHT: 192.02 LBS | HEIGHT: 69 IN | HEART RATE: 106 BPM | TEMPERATURE: 99 F | DIASTOLIC BLOOD PRESSURE: 84 MMHG | OXYGEN SATURATION: 95 %

## 2022-01-17 LAB
ALBUMIN SERPL ELPH-MCNC: 2.4 G/DL — LOW (ref 3.3–5)
ALP SERPL-CCNC: 60 U/L — SIGNIFICANT CHANGE UP (ref 40–120)
ALT FLD-CCNC: 23 U/L — SIGNIFICANT CHANGE UP (ref 12–78)
ANION GAP SERPL CALC-SCNC: 3 MMOL/L — LOW (ref 5–17)
APPEARANCE UR: CLEAR — SIGNIFICANT CHANGE UP
AST SERPL-CCNC: 21 U/L — SIGNIFICANT CHANGE UP (ref 15–37)
BACTERIA # UR AUTO: NEGATIVE — SIGNIFICANT CHANGE UP
BASOPHILS # BLD AUTO: 0.03 K/UL — SIGNIFICANT CHANGE UP (ref 0–0.2)
BASOPHILS NFR BLD AUTO: 0.8 % — SIGNIFICANT CHANGE UP (ref 0–2)
BILIRUB SERPL-MCNC: 0.2 MG/DL — SIGNIFICANT CHANGE UP (ref 0.2–1.2)
BILIRUB UR-MCNC: NEGATIVE — SIGNIFICANT CHANGE UP
BUN SERPL-MCNC: 62 MG/DL — HIGH (ref 7–23)
CALCIUM SERPL-MCNC: 9.2 MG/DL — SIGNIFICANT CHANGE UP (ref 8.5–10.1)
CHLORIDE SERPL-SCNC: 110 MMOL/L — HIGH (ref 96–108)
CO2 SERPL-SCNC: 25 MMOL/L — SIGNIFICANT CHANGE UP (ref 22–31)
COLOR SPEC: YELLOW — SIGNIFICANT CHANGE UP
CREAT SERPL-MCNC: 4.69 MG/DL — HIGH (ref 0.5–1.3)
DIFF PNL FLD: ABNORMAL
EOSINOPHIL # BLD AUTO: 0.27 K/UL — SIGNIFICANT CHANGE UP (ref 0–0.5)
EOSINOPHIL NFR BLD AUTO: 6.9 % — HIGH (ref 0–6)
EPI CELLS # UR: NEGATIVE — SIGNIFICANT CHANGE UP
GLUCOSE BLDC GLUCOMTR-MCNC: 168 MG/DL — HIGH (ref 70–99)
GLUCOSE SERPL-MCNC: 170 MG/DL — HIGH (ref 70–99)
GLUCOSE UR QL: 50 MG/DL
HCT VFR BLD CALC: 25.5 % — LOW (ref 39–50)
HGB BLD-MCNC: 8.4 G/DL — LOW (ref 13–17)
IMM GRANULOCYTES NFR BLD AUTO: 0.5 % — SIGNIFICANT CHANGE UP (ref 0–1.5)
KETONES UR-MCNC: NEGATIVE — SIGNIFICANT CHANGE UP
LEUKOCYTE ESTERASE UR-ACNC: NEGATIVE — SIGNIFICANT CHANGE UP
LYMPHOCYTES # BLD AUTO: 0.7 K/UL — LOW (ref 1–3.3)
LYMPHOCYTES # BLD AUTO: 17.8 % — SIGNIFICANT CHANGE UP (ref 13–44)
MCHC RBC-ENTMCNC: 29.3 PG — SIGNIFICANT CHANGE UP (ref 27–34)
MCHC RBC-ENTMCNC: 32.9 GM/DL — SIGNIFICANT CHANGE UP (ref 32–36)
MCV RBC AUTO: 88.9 FL — SIGNIFICANT CHANGE UP (ref 80–100)
MONOCYTES # BLD AUTO: 0.24 K/UL — SIGNIFICANT CHANGE UP (ref 0–0.9)
MONOCYTES NFR BLD AUTO: 6.1 % — SIGNIFICANT CHANGE UP (ref 2–14)
NEUTROPHILS # BLD AUTO: 2.67 K/UL — SIGNIFICANT CHANGE UP (ref 1.8–7.4)
NEUTROPHILS NFR BLD AUTO: 67.9 % — SIGNIFICANT CHANGE UP (ref 43–77)
NITRITE UR-MCNC: NEGATIVE — SIGNIFICANT CHANGE UP
NRBC # BLD: 0 /100 WBCS — SIGNIFICANT CHANGE UP (ref 0–0)
PH UR: 6 — SIGNIFICANT CHANGE UP (ref 5–8)
PLATELET # BLD AUTO: 363 K/UL — SIGNIFICANT CHANGE UP (ref 150–400)
POTASSIUM SERPL-MCNC: 5.7 MMOL/L — HIGH (ref 3.5–5.3)
POTASSIUM SERPL-SCNC: 5.7 MMOL/L — HIGH (ref 3.5–5.3)
PROT SERPL-MCNC: 7.9 GM/DL — SIGNIFICANT CHANGE UP (ref 6–8.3)
PROT UR-MCNC: 500 MG/DL
RAPID RVP RESULT: SIGNIFICANT CHANGE UP
RBC # BLD: 2.87 M/UL — LOW (ref 4.2–5.8)
RBC # FLD: 12.1 % — SIGNIFICANT CHANGE UP (ref 10.3–14.5)
RBC CASTS # UR COMP ASSIST: SIGNIFICANT CHANGE UP /HPF (ref 0–4)
SARS-COV-2 RNA SPEC QL NAA+PROBE: SIGNIFICANT CHANGE UP
SODIUM SERPL-SCNC: 138 MMOL/L — SIGNIFICANT CHANGE UP (ref 135–145)
SP GR SPEC: 1.01 — SIGNIFICANT CHANGE UP (ref 1.01–1.02)
UROBILINOGEN FLD QL: NEGATIVE MG/DL — SIGNIFICANT CHANGE UP
WBC # BLD: 3.93 K/UL — SIGNIFICANT CHANGE UP (ref 3.8–10.5)
WBC # FLD AUTO: 3.93 K/UL — SIGNIFICANT CHANGE UP (ref 3.8–10.5)
WBC UR QL: NEGATIVE — SIGNIFICANT CHANGE UP

## 2022-01-17 PROCEDURE — 99285 EMERGENCY DEPT VISIT HI MDM: CPT | Mod: FS

## 2022-01-17 PROCEDURE — 99223 1ST HOSP IP/OBS HIGH 75: CPT

## 2022-01-17 PROCEDURE — 74018 RADEX ABDOMEN 1 VIEW: CPT | Mod: 26

## 2022-01-17 PROCEDURE — 74176 CT ABD & PELVIS W/O CONTRAST: CPT | Mod: 26

## 2022-01-17 RX ORDER — SODIUM CHLORIDE 9 MG/ML
1000 INJECTION, SOLUTION INTRAVENOUS
Refills: 0 | Status: DISCONTINUED | OUTPATIENT
Start: 2022-01-17 | End: 2022-01-19

## 2022-01-17 RX ORDER — HYDRALAZINE HCL 50 MG
50 TABLET ORAL THREE TIMES A DAY
Refills: 0 | Status: DISCONTINUED | OUTPATIENT
Start: 2022-01-17 | End: 2022-01-19

## 2022-01-17 RX ORDER — SODIUM POLYSTYRENE SULFONATE 4.1 MEQ/G
30 POWDER, FOR SUSPENSION ORAL ONCE
Refills: 0 | Status: COMPLETED | OUTPATIENT
Start: 2022-01-17 | End: 2022-01-17

## 2022-01-17 RX ORDER — SENNA PLUS 8.6 MG/1
2 TABLET ORAL ONCE
Refills: 0 | Status: DISCONTINUED | OUTPATIENT
Start: 2022-01-17 | End: 2022-01-17

## 2022-01-17 RX ORDER — POLYETHYLENE GLYCOL 3350 17 G/17G
17 POWDER, FOR SOLUTION ORAL
Refills: 0 | Status: DISCONTINUED | OUTPATIENT
Start: 2022-01-17 | End: 2022-01-19

## 2022-01-17 RX ORDER — SENNA PLUS 8.6 MG/1
2 TABLET ORAL AT BEDTIME
Refills: 0 | Status: DISCONTINUED | OUTPATIENT
Start: 2022-01-17 | End: 2022-01-19

## 2022-01-17 RX ORDER — DEXTROSE 50 % IN WATER 50 %
50 SYRINGE (ML) INTRAVENOUS ONCE
Refills: 0 | Status: COMPLETED | OUTPATIENT
Start: 2022-01-17 | End: 2022-01-17

## 2022-01-17 RX ORDER — AMLODIPINE BESYLATE 2.5 MG/1
10 TABLET ORAL DAILY
Refills: 0 | Status: DISCONTINUED | OUTPATIENT
Start: 2022-01-17 | End: 2022-01-19

## 2022-01-17 RX ORDER — INSULIN HUMAN 100 [IU]/ML
5 INJECTION, SOLUTION SUBCUTANEOUS ONCE
Refills: 0 | Status: COMPLETED | OUTPATIENT
Start: 2022-01-17 | End: 2022-01-17

## 2022-01-17 RX ORDER — LIDOCAINE 4 G/100G
1 CREAM TOPICAL ONCE
Refills: 0 | Status: COMPLETED | OUTPATIENT
Start: 2022-01-17 | End: 2022-01-17

## 2022-01-17 RX ORDER — ACETAMINOPHEN 500 MG
975 TABLET ORAL ONCE
Refills: 0 | Status: COMPLETED | OUTPATIENT
Start: 2022-01-17 | End: 2022-01-17

## 2022-01-17 RX ADMIN — POLYETHYLENE GLYCOL 3350 17 GRAM(S): 17 POWDER, FOR SOLUTION ORAL at 19:49

## 2022-01-17 RX ADMIN — Medication 975 MILLIGRAM(S): at 13:23

## 2022-01-17 RX ADMIN — Medication 50 MILLIGRAM(S): at 22:57

## 2022-01-17 RX ADMIN — SODIUM CHLORIDE 75 MILLILITER(S): 9 INJECTION, SOLUTION INTRAVENOUS at 22:00

## 2022-01-17 RX ADMIN — INSULIN HUMAN 5 UNIT(S): 100 INJECTION, SOLUTION SUBCUTANEOUS at 17:05

## 2022-01-17 RX ADMIN — SENNA PLUS 2 TABLET(S): 8.6 TABLET ORAL at 19:49

## 2022-01-17 RX ADMIN — Medication 50 MILLILITER(S): at 17:05

## 2022-01-17 RX ADMIN — LIDOCAINE 1 PATCH: 4 CREAM TOPICAL at 13:23

## 2022-01-17 RX ADMIN — SODIUM POLYSTYRENE SULFONATE 30 GRAM(S): 4.1 POWDER, FOR SUSPENSION ORAL at 17:05

## 2022-01-17 RX ADMIN — LIDOCAINE 1 PATCH: 4 CREAM TOPICAL at 20:00

## 2022-01-17 NOTE — ED PROVIDER NOTE - NS ED ROS FT
Constitutional: (-) Fever, (-) Anorexia, (-) Generalized Malaise  Eyes: (-)Discharge, (-) Irritation,  (-) Visual changes  EARS: (-) Ear Pain, (-) Apparent hearing changes  NOSE: (-) Congestion, (-) Bloody nose  MOUTH/THROAT: (-) Vocal Changes, (-) Drooling, (-) Sore throat  NECK: (-) Lumps, (-) Stiffness, (-) Pain  CV: (-) Chest Pain, (-) Palpitations, (-) Edema   RESP:  (-) Cough, (-) SOB, (-) MIRANDA,  (-) Wheezing  GI: (-) Nausea, (-) Vomiting, (-) Abdominal Pain, (-) Diarrhea, (-) Constipation, (-) Bloody stools  : (-) Dysuria, (-) Frequency, (-) Hematuria, (-) Incontinence  MSK: (-) Joint Pain, (+) Back Pain, (-) Deformities  SKIN: (-) Wounds, (-) Color change, (-)Rash, (-) Swelling  NEURO:(-) Headache, (-) Dizziness, (-) Numbness/Tingling,  (-)LOC

## 2022-01-17 NOTE — H&P ADULT - NSHPPHYSICALEXAM_GEN_ALL_CORE
PHYSICAL EXAMINATION:  Vital Signs Last 24 Hrs  T(C): 37.1 (17 Jan 2022 11:13), Max: 37.1 (17 Jan 2022 11:13)  T(F): 98.7 (17 Jan 2022 11:13), Max: 98.7 (17 Jan 2022 11:13)  HR: 106 (17 Jan 2022 11:13) (106 - 106)  BP: 182/84 (17 Jan 2022 11:13) (182/84 - 182/84)  BP(mean): --  RR: 18 (17 Jan 2022 11:13) (18 - 18)  SpO2: 95% (17 Jan 2022 11:13) (95% - 95%)  CAPILLARY BLOOD GLUCOSE          GENERAL: NAD, well-groomed, well-developed  HEAD:  atraumatic, normocephalic  EYES: sclera anicteric  ENMT: mucous membranes moist  NECK: supple, No JVD  CHEST/LUNG: clear to auscultation bilaterally; no rales, rhonchi, or wheezing b/l  HEART: normal S1, S2  ABDOMEN: BS+, soft, ND, NT   EXTREMITIES:  pulses palpable; no clubbing, cyanosis, or edema b/l LEs  NEURO: awake, alert, interactive; moves all extremities  SKIN: no rashes or lesions PHYSICAL EXAMINATION:  Vital Signs Last 24 Hrs  T(C): 37.1 (17 Jan 2022 11:13), Max: 37.1 (17 Jan 2022 11:13)  T(F): 98.7 (17 Jan 2022 11:13), Max: 98.7 (17 Jan 2022 11:13)  HR: 106 (17 Jan 2022 11:13) (106 - 106)  BP: 182/84 (17 Jan 2022 11:13) (182/84 - 182/84)  BP(mean): --  RR: 18 (17 Jan 2022 11:13) (18 - 18)  SpO2: 95% (17 Jan 2022 11:13) (95% - 95%)  CAPILLARY BLOOD GLUCOSE          GENERAL: NAD, comfortable on 1-C,not uremic, no fevers or SOB  HEAD:  atraumatic, normocephalic  EYES: sclera anicteric  ENMT: mucous membranes moist  NECK: supple, No JVD  CHEST/LUNG: clear to auscultation bilaterally; no rales, rhonchi, or wheezing b/l  HEART: normal S1, S2  ABDOMEN: BS+, soft, ND, NT   EXTREMITIES:  pulses palpable; no clubbing, cyanosis, or edema b/l LEs  NEURO: awake, alert, interactive; moves all extremities  SKIN: no rashes or lesions (4) no limitations

## 2022-01-17 NOTE — ED ADULT TRIAGE NOTE - CHIEF COMPLAINT QUOTE
p/w right flank pain x3 days, with urine frequency, pain 10/10, intermittent, no radiation. denies injury/trauma.

## 2022-01-17 NOTE — H&P ADULT - NSHPLABSRESULTS_GEN_ALL_CORE
LABS:                        8.4    3.93  )-----------( 363      ( 2022 13:27 )             25.5         138  |  110<H>  |  62<H>  ----------------------------<  170<H>  5.7<H>   |  25  |  4.69<H>    Ca    9.2      2022 13:27    TPro  7.9  /  Alb  2.4<L>  /  TBili  0.2  /  DBili  x   /  AST  21  /  ALT  23  /  AlkPhos  60        Urinalysis Basic - ( 2022 15:11 )    Color: Yellow / Appearance: Clear / S.015 / pH: x  Gluc: x / Ketone: Negative  / Bili: Negative / Urobili: Negative mg/dL   Blood: x / Protein: 500 mg/dL / Nitrite: Negative   Leuk Esterase: Negative / RBC: x / WBC x   Sq Epi: x / Non Sq Epi: x / Bacteria: x          RADIOLOGY & ADDITIONAL TESTS:

## 2022-01-17 NOTE — H&P ADULT - HISTORY OF PRESENT ILLNESS
65 yo male PMH DM(II), HTN, HLD presents complaning of atraumatic low back pain x 1 week. States that it is constant, waxes and wanes, worse with certain movement. Also reports no bowel movement for 3 days. Denies abd pain, numbness/tingling, LE strength changes, urinary incontience/retention, fevers/chills, nausea/vomiting and other associated sx.  Reports he was recently seen in ED on January 10 and was told his kidney levels are high, but he signed himself out AMA.  67 yo male PMH DM(II), HTN, HLD presents complaning of atraumatic low back pain x 1 week. States that it is constant, waxes and wanes, worse with certain movement. Also reports no bowel movement for 3 days. Denies abd pain, numbness/tingling, LE strength changes, urinary incontience/retention, fevers/chills, nausea/vomiting and other associated sx.  Reports he was recently seen in ED on January 10 and was told his kidney levels are high, but he signed himself out AMA and was eager to go home.

## 2022-01-17 NOTE — ED PROVIDER NOTE - OBJECTIVE STATEMENT
67yo male with pmh of DM, HTN, HLD presents complaning of b/l, atraumatic low back pain x 1 week. States that it is constant, waxes and wanes, worse with certain movement. Also reports not having a bowel movement x 3 days. + passing gas. Denies abd pain, numbness/tingling, strength changes, urinary incontience/retention, fevers/chills, nausea/vomiting and other associated sx.   Reports he was recently seen in ED and was told his kidney levels are high - pcp told him to have them checked here in ED.

## 2022-01-17 NOTE — ED ADULT TRIAGE NOTE - PAIN RATING/NUMBER SCALE (0-10): REST
10
no loss of consciousness/no sleeping issues/no pain/no acting out behaviors/no bruising/no headache/no laceration/no decreased eating/drinking/no difficulty bearing weight/no disorientation/no dizziness/no crying/no back pain/no fussiness/no neck tenderness

## 2022-01-17 NOTE — CONSULT NOTE ADULT - SUBJECTIVE AND OBJECTIVE BOX
Patient chart reviewed, full consult to follow.     Thank you for the courtesy of this consultation. [facility] NEPHROLOGY SERVICES, [facility]  NEPHROLOGY AND HYPERTENSION  [address]  [address]  [place] [address]  [phone]    MD MAUREEN SANCHEZ MD ANDREY GONCHARUK, MD MADHU KORRAPATI, MD YELENA ROSENBERG, MD BINNY KOSHY, MD CHRISTOPHER CAPUTO, MD EDWARD BOVER, MD      Information from chart:  "Patient is a 66y old  Male who presents with a chief complaint of Low back pain with constipation and CKD (2022 16:34)    HPI:  67 yo male PMH DM(II), HTN, HLD presents complaning of atraumatic low back pain x 1 week. States that it is constant, waxes and wanes, worse with certain movement. Also reports no bowel movement for 3 days. Denies abd pain, numbness/tingling, LE strength changes, urinary incontience/retention, fevers/chills, nausea/vomiting and other associated sx.  Reports he was recently seen in ED on January 10 and was told his kidney levels are high, but he signed himself out AMA and was eager to go home.   (2022 15:53)   "    Patient feels ok; low back pain  Noted increasing Cr trend since last May 2021  Seen by my associate; weak paraprotein     PAST MEDICAL & SURGICAL HISTORY:  Diabetes    HTN (hypertension)      FAMILY HISTORY:    Allergies    No Known Allergies    Intolerances      Home Medications:  metFORMIN 500 mg oral tablet: 1 tab(s) orally 2 times a day (10 Ulysses 2022 12:49)    MEDICATIONS  (STANDING):  amLODIPine   Tablet 10 milliGRAM(s) Oral daily  bisacodyl Suppository 10 milliGRAM(s) Rectal once  hydrALAZINE 50 milliGRAM(s) Oral three times a day  polyethylene glycol 3350 17 Gram(s) Oral two times a day  senna 2 Tablet(s) Oral at bedtime  sodium chloride 0.45%. 1000 milliLiter(s) (75 mL/Hr) IV Continuous <Continuous>    MEDICATIONS  (PRN):    Vital Signs Last 24 Hrs  T(C): 37.1 (2022 11:13), Max: 37.1 (2022 11:13)  T(F): 98.7 (2022 11:13), Max: 98.7 (2022 11:13)  HR: 106 (2022 11:13) (106 - 106)  BP: 182/84 (2022 11:13) (182/84 - 182/84)  BP(mean): --  RR: 18 (2022 11:13) (18 - 18)  SpO2: 95% (2022 11:13) (95% - 95%)    Daily Height in cm: 175.26 (2022 11:13)    Daily     CAPILLARY BLOOD GLUCOSE        PHYSICAL EXAM:      T(C): 37.1 (22 @ 11:13), Max: 37.1 (22 @ 11:13)  HR: 106 (22 @ 11:13) (106 - 106)  BP: 182/84 (22 @ 11:13) (182/84 - 182/84)  RR: 18 (22 @ 11:13) (18 - 18)  SpO2: 95% (22 @ 11:13) (95% - 95%)  Wt(kg): --  Lungs clear  Heart S1S2  Abd soft NT ND  Extremities:   tr edema                  138  |  110<H>  |  62<H>  ----------------------------<  170<H>  5.7<H>   |  25  |  4.69<H>    Ca    9.2      2022 13:27    TPro  7.9  /  Alb  2.4<L>  /  TBili  0.2  /  DBili  x   /  AST  21  /  ALT  23  /  AlkPhos  60                            8.4    3.93  )-----------( 363      ( 2022 13:27 )             25.5     Creatinine Trend: 4.69<--, 7.34<--  Urinalysis Basic - ( 2022 15:11 )    Color: Yellow / Appearance: Clear / S.015 / pH: x  Gluc: x / Ketone: Negative  / Bili: Negative / Urobili: Negative mg/dL   Blood: x / Protein: 500 mg/dL / Nitrite: Negative   Leuk Esterase: Negative / RBC: 0-2 /HPF / WBC Negative   Sq Epi: x / Non Sq Epi: Negative / Bacteria: Negative      Trend Bun/Cr  22 @ 13:27  BUN/CR -  62<H> / 4.69<H>  01-10-22 @ 17:00  BUN/CR -  83<H> / 7.34<H>  21 @ 07:34  BUN/CR -  40<H> / 3.01<H>  21 @ 01:37  BUN/CR -  43<H> / 3.30<H>        Assessment   MIESHA CKD 4; back pain  r/o paraprotein related, slowly progressing   Post renal factors     Plan  Paraprotein screen  Bladder scan  Urine prot: creatinine  Medical rx K  Will follow    Tang Cano MD

## 2022-01-17 NOTE — ED PROVIDER NOTE - PHYSICAL EXAMINATION
PE:   GEN: Awake, alert, interactive, NAD, non-toxic appearing.   HEAD AND NECK: NC/AT. Airway patent. Neck supple.   EYES: Clear b/l. PERRL  CARDIAC: RRR. S1, S2. No evident pedal edema.    RESP: Normal respiratory effort with no use of accessory muscles or retractions. Clear throughout on auscultation.  ABD: soft, non-distended, non-tender. No rebound, no guarding. No CVA tenderness   NEURO: AOx3, CN II-XII grossly intact, no focal deficits. Sensation intact. No saddle anesthesia. + rectal tone.  MSK: No midline or paraspinal tenderness. Neg straight leg raise b/l. Strength 5/5 throughout. Moving all extremities with no apparent deformities.   SKIN: Warm, dry, intact normal color

## 2022-01-17 NOTE — ED PROVIDER NOTE - ATTENDING CONTRIBUTION TO CARE
65yo male with pmh of DM, HTN, HLD presents complaning of b/l, atraumatic low back pain x 1 week assoc with constipation for 3 days. regular urine output. well develop, elderly male. cv s1, s2, rrr, lungs breathing comfortably on Ra. abdomen soft, nt, nd. back with no midline or other ttp. no lesions. no cva ttp. -SLR. patient wit renal failure, creatinine improved from 1 week ago however now hyperkalemic without concerning ekg changes. will treat for hyperkalemia with kayexelate, insulin, dextrose. kayeelate and bowel prep for constipation will require admission for managmenet of renal failure with electrolyte disturbances. renal paged

## 2022-01-17 NOTE — ED PROVIDER NOTE - CLINICAL SUMMARY MEDICAL DECISION MAKING FREE TEXT BOX
67yo male with pmh of DM, HTN, HLD presents complaning of b/l, atraumatic low back pain x 1 week. No concern for caude despite constipation given + rectal tone and lack of oher neurological symptoms. No urinary symptoms or cva tenderness, less concern for pyelo or kidney stone but will get labs and UA. Most likely caused by MSK or constipation - will get KUB to evaluate for constipation. Dispo pending work up

## 2022-01-18 LAB
A1C WITH ESTIMATED AVERAGE GLUCOSE RESULT: 7 % — HIGH (ref 4–5.6)
ANION GAP SERPL CALC-SCNC: 4 MMOL/L — LOW (ref 5–17)
BUN SERPL-MCNC: 54 MG/DL — HIGH (ref 7–23)
CALCIUM SERPL-MCNC: 8.9 MG/DL — SIGNIFICANT CHANGE UP (ref 8.5–10.1)
CHLORIDE SERPL-SCNC: 112 MMOL/L — HIGH (ref 96–108)
CO2 SERPL-SCNC: 24 MMOL/L — SIGNIFICANT CHANGE UP (ref 22–31)
CREAT SERPL-MCNC: 4.21 MG/DL — HIGH (ref 0.5–1.3)
ERYTHROCYTE [SEDIMENTATION RATE] IN BLOOD: 42 MM/HR — HIGH (ref 0–20)
ESTIMATED AVERAGE GLUCOSE: 154 MG/DL — HIGH (ref 68–114)
GLUCOSE BLDC GLUCOMTR-MCNC: 143 MG/DL — HIGH (ref 70–99)
GLUCOSE BLDC GLUCOMTR-MCNC: 156 MG/DL — HIGH (ref 70–99)
GLUCOSE BLDC GLUCOMTR-MCNC: 186 MG/DL — HIGH (ref 70–99)
GLUCOSE SERPL-MCNC: 90 MG/DL — SIGNIFICANT CHANGE UP (ref 70–99)
HCT VFR BLD CALC: 24.1 % — LOW (ref 39–50)
HGB BLD-MCNC: 7.6 G/DL — LOW (ref 13–17)
IGA FLD-MCNC: 193 MG/DL — SIGNIFICANT CHANGE UP (ref 84–499)
IGG FLD-MCNC: 2166 MG/DL — HIGH (ref 610–1660)
IGM SERPL-MCNC: 84 MG/DL — SIGNIFICANT CHANGE UP (ref 35–242)
KAPPA LC SER QL IFE: 18.15 MG/DL — HIGH (ref 0.33–1.94)
KAPPA LC SER QL IFE: 18.15 MG/DL — HIGH (ref 0.33–1.94)
KAPPA/LAMBDA FREE LIGHT CHAIN RATIO, SERUM: 1.26 RATIO — SIGNIFICANT CHANGE UP (ref 0.26–1.65)
KAPPA/LAMBDA FREE LIGHT CHAIN RATIO, SERUM: 1.26 RATIO — SIGNIFICANT CHANGE UP (ref 0.26–1.65)
LAMBDA LC SER QL IFE: 14.37 MG/DL — HIGH (ref 0.57–2.63)
LAMBDA LC SER QL IFE: 14.37 MG/DL — HIGH (ref 0.57–2.63)
MCHC RBC-ENTMCNC: 27.9 PG — SIGNIFICANT CHANGE UP (ref 27–34)
MCHC RBC-ENTMCNC: 31.5 GM/DL — LOW (ref 32–36)
MCV RBC AUTO: 88.6 FL — SIGNIFICANT CHANGE UP (ref 80–100)
NRBC # BLD: 0 /100 WBCS — SIGNIFICANT CHANGE UP (ref 0–0)
PLATELET # BLD AUTO: 340 K/UL — SIGNIFICANT CHANGE UP (ref 150–400)
POTASSIUM SERPL-MCNC: 5 MMOL/L — SIGNIFICANT CHANGE UP (ref 3.5–5.3)
POTASSIUM SERPL-SCNC: 5 MMOL/L — SIGNIFICANT CHANGE UP (ref 3.5–5.3)
PROT SERPL-MCNC: 6.5 G/DL — SIGNIFICANT CHANGE UP (ref 6–8.3)
PROT SERPL-MCNC: 6.5 G/DL — SIGNIFICANT CHANGE UP (ref 6–8.3)
RBC # BLD: 2.72 M/UL — LOW (ref 4.2–5.8)
RBC # FLD: 12.2 % — SIGNIFICANT CHANGE UP (ref 10.3–14.5)
SODIUM SERPL-SCNC: 140 MMOL/L — SIGNIFICANT CHANGE UP (ref 135–145)
WBC # BLD: 4.02 K/UL — SIGNIFICANT CHANGE UP (ref 3.8–10.5)
WBC # FLD AUTO: 4.02 K/UL — SIGNIFICANT CHANGE UP (ref 3.8–10.5)

## 2022-01-18 PROCEDURE — 99232 SBSQ HOSP IP/OBS MODERATE 35: CPT

## 2022-01-18 RX ORDER — DEXTROSE 50 % IN WATER 50 %
25 SYRINGE (ML) INTRAVENOUS ONCE
Refills: 0 | Status: DISCONTINUED | OUTPATIENT
Start: 2022-01-18 | End: 2022-01-19

## 2022-01-18 RX ORDER — INFLUENZA VIRUS VACCINE 15; 15; 15; 15 UG/.5ML; UG/.5ML; UG/.5ML; UG/.5ML
0.7 SUSPENSION INTRAMUSCULAR ONCE
Refills: 0 | Status: DISCONTINUED | OUTPATIENT
Start: 2022-01-18 | End: 2022-01-19

## 2022-01-18 RX ORDER — DEXTROSE 50 % IN WATER 50 %
15 SYRINGE (ML) INTRAVENOUS ONCE
Refills: 0 | Status: DISCONTINUED | OUTPATIENT
Start: 2022-01-18 | End: 2022-01-19

## 2022-01-18 RX ORDER — SODIUM CHLORIDE 9 MG/ML
1000 INJECTION, SOLUTION INTRAVENOUS
Refills: 0 | Status: DISCONTINUED | OUTPATIENT
Start: 2022-01-18 | End: 2022-01-19

## 2022-01-18 RX ORDER — DEXTROSE 50 % IN WATER 50 %
12.5 SYRINGE (ML) INTRAVENOUS ONCE
Refills: 0 | Status: DISCONTINUED | OUTPATIENT
Start: 2022-01-18 | End: 2022-01-19

## 2022-01-18 RX ORDER — INSULIN LISPRO 100/ML
VIAL (ML) SUBCUTANEOUS AT BEDTIME
Refills: 0 | Status: DISCONTINUED | OUTPATIENT
Start: 2022-01-18 | End: 2022-01-19

## 2022-01-18 RX ORDER — INSULIN LISPRO 100/ML
VIAL (ML) SUBCUTANEOUS
Refills: 0 | Status: DISCONTINUED | OUTPATIENT
Start: 2022-01-18 | End: 2022-01-19

## 2022-01-18 RX ORDER — GLUCAGON INJECTION, SOLUTION 0.5 MG/.1ML
1 INJECTION, SOLUTION SUBCUTANEOUS ONCE
Refills: 0 | Status: DISCONTINUED | OUTPATIENT
Start: 2022-01-18 | End: 2022-01-19

## 2022-01-18 RX ADMIN — Medication 50 MILLIGRAM(S): at 06:50

## 2022-01-18 RX ADMIN — Medication 50 MILLIGRAM(S): at 21:40

## 2022-01-18 RX ADMIN — SODIUM CHLORIDE 75 MILLILITER(S): 9 INJECTION, SOLUTION INTRAVENOUS at 07:55

## 2022-01-18 RX ADMIN — POLYETHYLENE GLYCOL 3350 17 GRAM(S): 17 POWDER, FOR SOLUTION ORAL at 17:15

## 2022-01-18 RX ADMIN — Medication 50 MILLIGRAM(S): at 13:02

## 2022-01-18 RX ADMIN — AMLODIPINE BESYLATE 10 MILLIGRAM(S): 2.5 TABLET ORAL at 06:49

## 2022-01-18 RX ADMIN — POLYETHYLENE GLYCOL 3350 17 GRAM(S): 17 POWDER, FOR SOLUTION ORAL at 06:50

## 2022-01-18 RX ADMIN — Medication 2: at 15:48

## 2022-01-18 NOTE — PATIENT PROFILE ADULT - FALL HARM RISK - UNIVERSAL INTERVENTIONS
Bed in lowest position, wheels locked, appropriate side rails in place/Call bell, personal items and telephone in reach/Instruct patient to call for assistance before getting out of bed or chair/Non-slip footwear when patient is out of bed/[place] to call system/Physically safe environment - no spills, clutter or unnecessary equipment/Purposeful Proactive Rounding/Room/bathroom lighting operational, light cord in reach

## 2022-01-19 ENCOUNTER — TRANSCRIPTION ENCOUNTER (OUTPATIENT)
Age: 67
End: 2022-01-19

## 2022-01-19 VITALS
OXYGEN SATURATION: 98 % | RESPIRATION RATE: 18 BRPM | DIASTOLIC BLOOD PRESSURE: 81 MMHG | SYSTOLIC BLOOD PRESSURE: 161 MMHG | TEMPERATURE: 98 F | HEART RATE: 87 BPM

## 2022-01-19 LAB
ANION GAP SERPL CALC-SCNC: 8 MMOL/L — SIGNIFICANT CHANGE UP (ref 5–17)
BUN SERPL-MCNC: 56 MG/DL — HIGH (ref 7–23)
CALCIUM SERPL-MCNC: 8.6 MG/DL — SIGNIFICANT CHANGE UP (ref 8.5–10.1)
CHLORIDE SERPL-SCNC: 111 MMOL/L — HIGH (ref 96–108)
CO2 SERPL-SCNC: 22 MMOL/L — SIGNIFICANT CHANGE UP (ref 22–31)
CREAT ?TM UR-MCNC: 122 MG/DL — SIGNIFICANT CHANGE UP
CREAT SERPL-MCNC: 4.6 MG/DL — HIGH (ref 0.5–1.3)
FERRITIN SERPL-MCNC: 419 NG/ML — HIGH (ref 30–400)
FOLATE SERPL-MCNC: 10.6 NG/ML — SIGNIFICANT CHANGE UP
GLUCOSE BLDC GLUCOMTR-MCNC: 107 MG/DL — HIGH (ref 70–99)
GLUCOSE BLDC GLUCOMTR-MCNC: 123 MG/DL — HIGH (ref 70–99)
GLUCOSE BLDC GLUCOMTR-MCNC: 194 MG/DL — HIGH (ref 70–99)
GLUCOSE SERPL-MCNC: 110 MG/DL — HIGH (ref 70–99)
HCT VFR BLD CALC: 26.2 % — LOW (ref 39–50)
HGB BLD-MCNC: 8.2 G/DL — LOW (ref 13–17)
IRON SATN MFR SERPL: 37 % — SIGNIFICANT CHANGE UP (ref 16–55)
IRON SATN MFR SERPL: 62 UG/DL — SIGNIFICANT CHANGE UP (ref 45–165)
MCHC RBC-ENTMCNC: 27.9 PG — SIGNIFICANT CHANGE UP (ref 27–34)
MCHC RBC-ENTMCNC: 31.3 GM/DL — LOW (ref 32–36)
MCV RBC AUTO: 89.1 FL — SIGNIFICANT CHANGE UP (ref 80–100)
NRBC # BLD: 0 /100 WBCS — SIGNIFICANT CHANGE UP (ref 0–0)
PLATELET # BLD AUTO: 354 K/UL — SIGNIFICANT CHANGE UP (ref 150–400)
POTASSIUM SERPL-MCNC: 4.9 MMOL/L — SIGNIFICANT CHANGE UP (ref 3.5–5.3)
POTASSIUM SERPL-SCNC: 4.9 MMOL/L — SIGNIFICANT CHANGE UP (ref 3.5–5.3)
PROT ?TM UR-MCNC: 169 MG/DL — HIGH (ref 0–12)
PROT ?TM UR-MCNC: 169 MG/DL — HIGH (ref 0–12)
PROT/CREAT UR-RTO: 1.4 RATIO — HIGH (ref 0–0.2)
RBC # BLD: 2.94 M/UL — LOW (ref 4.2–5.8)
RBC # BLD: 2.94 M/UL — LOW (ref 4.2–5.8)
RBC # FLD: 12.2 % — SIGNIFICANT CHANGE UP (ref 10.3–14.5)
RETICS #: 38.5 K/UL — SIGNIFICANT CHANGE UP (ref 25–125)
RETICS/RBC NFR: 1.3 % — SIGNIFICANT CHANGE UP (ref 0.5–2.5)
SODIUM SERPL-SCNC: 141 MMOL/L — SIGNIFICANT CHANGE UP (ref 135–145)
TIBC SERPL-MCNC: 166 UG/DL — LOW (ref 220–430)
UIBC SERPL-MCNC: 105 UG/DL — LOW (ref 110–370)
VIT B12 SERPL-MCNC: 839 PG/ML — SIGNIFICANT CHANGE UP (ref 232–1245)
WBC # BLD: 3.92 K/UL — SIGNIFICANT CHANGE UP (ref 3.8–10.5)
WBC # FLD AUTO: 3.92 K/UL — SIGNIFICANT CHANGE UP (ref 3.8–10.5)

## 2022-01-19 PROCEDURE — 99239 HOSP IP/OBS DSCHRG MGMT >30: CPT

## 2022-01-19 PROCEDURE — 76775 US EXAM ABDO BACK WALL LIM: CPT | Mod: 26

## 2022-01-19 RX ORDER — METFORMIN HYDROCHLORIDE 850 MG/1
1 TABLET ORAL
Qty: 0 | Refills: 0 | DISCHARGE

## 2022-01-19 RX ADMIN — SODIUM CHLORIDE 75 MILLILITER(S): 9 INJECTION, SOLUTION INTRAVENOUS at 14:11

## 2022-01-19 RX ADMIN — Medication 50 MILLIGRAM(S): at 14:10

## 2022-01-19 RX ADMIN — Medication 2: at 16:33

## 2022-01-19 RX ADMIN — Medication 50 MILLIGRAM(S): at 05:45

## 2022-01-19 RX ADMIN — AMLODIPINE BESYLATE 10 MILLIGRAM(S): 2.5 TABLET ORAL at 05:45

## 2022-01-19 RX ADMIN — SODIUM CHLORIDE 75 MILLILITER(S): 9 INJECTION, SOLUTION INTRAVENOUS at 00:05

## 2022-01-19 NOTE — DISCHARGE NOTE PROVIDER - CARE PROVIDER_API CALL
Tang Cano  INTERNAL MEDICINE  [address], [address] [place] [id number]  Phone: [phone]  Fax: [phone]  Follow Up Time:     Gastroenterology,   Phone: (   )    -  Fax: (   )    -  Follow Up Time:

## 2022-01-19 NOTE — DISCHARGE NOTE NURSING/CASE MANAGEMENT/SOCIAL WORK - PATIENT PORTAL LINK FT
You can access the FollowMyHealth Patient Portal offered by [facility] by registering at the following website: [email or web address]. By joining [facility]’s FollowMyHealth portal, you will also be able to view your health information using other applications (apps) compatible with our system.

## 2022-01-19 NOTE — DISCHARGE NOTE PROVIDER - NSDCMRMEDTOKEN_GEN_ALL_CORE_FT
amLODIPine 10 mg oral tablet: 1 tab(s) orally once a day  hydrALAZINE 25 mg oral tablet: 1 tab(s) orally 3 times a day

## 2022-01-19 NOTE — DISCHARGE NOTE PROVIDER - NSDCFUADDINST_GEN_ALL_CORE_FT
It is important to see your primary physician as well as the physicians noted below within the next week to perform a comprehensive medical review.  Call their offices for an appointment as soon as you leave the hospital.  You will also need to see them for renewal of your medications.  If you do not have a primary physician, contact the [facility] Physician Referral Service at [phone].  To obtain your results, you can access the [email or web address] Patient Portal at [email or web address].  Your medical issues appear to be stable at this time, but if your symptoms recur or worsen, contact your physicians and/or return to the hospital if necessary.  If you encounter any issues or questions with your medication, call your physicians before stopping the medication.  Do not drive.  Limit your diet to 2 grams of sodium daily.

## 2022-01-19 NOTE — PROGRESS NOTE ADULT - SUBJECTIVE AND OBJECTIVE BOX
Patient: JALEESA RAMIRES [id number] 66y Male                            Hospitalist Attending Note    No complaints.  No SOB.  Feeling better.   +BM    ____________________PHYSICAL EXAM:  GENERAL:  NAD Alert and Oriented x 3   HEENT: NCAT  CARDIOVASCULAR:  S1, S2  LUNGS: CTAB  ABDOMEN:  soft, (-) tenderness, (-) distension, (+) bowel sounds, (-) guarding, (-) rebound (-) rigidity  EXTREMITIES:  no cyanosis / clubbing.  + edema.   ____________________     VITALS:  Vital Signs Last 24 Hrs  T(C): 36.9 (2022 12:03), Max: 36.9 (2022 12:03)  T(F): 98.4 (2022 12:03), Max: 98.4 (2022 12:03)  HR: 88 (2022 12:03) (75 - 93)  BP: 159/74 (2022 12:03) (129/79 - 159/74)  BP(mean): --  RR: 18 (2022 12:03) (16 - 18)  SpO2: 98% (2022 12:03) (96% - 98%) Daily     Daily   CAPILLARY BLOOD GLUCOSE      POCT Blood Glucose.: 156 mg/dL (2022 11:31)  POCT Blood Glucose.: 168 mg/dL (2022 22:59)    I&O's Summary      HISTORY:  PAST MEDICAL & SURGICAL HISTORY:  Diabetes    HTN (hypertension)    Allergies    No Known Allergies    Intolerances       LABS:                        7.6    4.02  )-----------( 340      ( 2022 07:30 )             24.1     -    140  |  112<H>  |  54<H>  ----------------------------<  90  5.0   |  24  |  4.21<H>    Ca    8.9      2022 07:30    TPro  6.5  /  Alb  x   /  TBili  x   /  DBili  x   /  AST  x   /  ALT  x   /  AlkPhos  x         LIVER FUNCTIONS - ( 2022 10:52 )  Alb: x     / Pro: 6.5 g/dL / ALK PHOS: x     / ALT: x     / AST: x     / GGT: x           Urinalysis Basic - ( 2022 15:11 )    Color: Yellow / Appearance: Clear / S.015 / pH: x  Gluc: x / Ketone: Negative  / Bili: Negative / Urobili: Negative mg/dL   Blood: x / Protein: 500 mg/dL / Nitrite: Negative   Leuk Esterase: Negative / RBC: 0-2 /HPF / WBC Negative   Sq Epi: x / Non Sq Epi: Negative / Bacteria: Negative              MEDICATIONS:  MEDICATIONS  (STANDING):  amLODIPine   Tablet 10 milliGRAM(s) Oral daily  bisacodyl Suppository 10 milliGRAM(s) Rectal once  dextrose 40% Gel 15 Gram(s) Oral once  dextrose 5%. 1000 milliLiter(s) (50 mL/Hr) IV Continuous <Continuous>  dextrose 5%. 1000 milliLiter(s) (100 mL/Hr) IV Continuous <Continuous>  dextrose 50% Injectable 25 Gram(s) IV Push once  dextrose 50% Injectable 12.5 Gram(s) IV Push once  dextrose 50% Injectable 25 Gram(s) IV Push once  glucagon  Injectable 1 milliGRAM(s) IntraMuscular once  hydrALAZINE 50 milliGRAM(s) Oral three times a day  influenza  Vaccine (HIGH DOSE) 0.7 milliLiter(s) IntraMuscular once  insulin lispro (ADMELOG) corrective regimen sliding scale   SubCutaneous three times a day before meals  insulin lispro (ADMELOG) corrective regimen sliding scale   SubCutaneous at bedtime  polyethylene glycol 3350 17 Gram(s) Oral two times a day  senna 2 Tablet(s) Oral at bedtime  sodium chloride 0.45%. 1000 milliLiter(s) (75 mL/Hr) IV Continuous <Continuous>    MEDICATIONS  (PRN):      < from: CT Abdomen and Pelvis No Cont (22 @ 16:28) >  No hydronephrosis.    Diffuse bladder wall thickening, which can be seen with chronic outlet   obstruction. Correlate with urinalysis if there is clinical concern for   cystitis.    Circumferential rectal wall thickening may suggest proctitis.    < end of copied text >  
[facility] NEPHROLOGY SERVICES, [facility]  NEPHROLOGY AND HYPERTENSION  [address]  [address]  [place] [address]  [phone]    MD MAUREEN SANCHEZ MD ANDREY GONCHARUK, MD MADHU KORRAPATI, MD YELENA ROSENBERG, MD BINNY KOSHY, MD CHRISTOPHER CAPUTO, MD LUCRETIA WILKERSON MD          Patient events noted  feels well    MEDICATIONS  (STANDING):  amLODIPine   Tablet 10 milliGRAM(s) Oral daily  bisacodyl Suppository 10 milliGRAM(s) Rectal once  dextrose 40% Gel 15 Gram(s) Oral once  dextrose 5%. 1000 milliLiter(s) (50 mL/Hr) IV Continuous <Continuous>  dextrose 5%. 1000 milliLiter(s) (100 mL/Hr) IV Continuous <Continuous>  dextrose 50% Injectable 25 Gram(s) IV Push once  dextrose 50% Injectable 12.5 Gram(s) IV Push once  dextrose 50% Injectable 25 Gram(s) IV Push once  glucagon  Injectable 1 milliGRAM(s) IntraMuscular once  hydrALAZINE 50 milliGRAM(s) Oral three times a day  influenza  Vaccine (HIGH DOSE) 0.7 milliLiter(s) IntraMuscular once  insulin lispro (ADMELOG) corrective regimen sliding scale   SubCutaneous three times a day before meals  insulin lispro (ADMELOG) corrective regimen sliding scale   SubCutaneous at bedtime  polyethylene glycol 3350 17 Gram(s) Oral two times a day  senna 2 Tablet(s) Oral at bedtime  sodium chloride 0.45%. 1000 milliLiter(s) (75 mL/Hr) IV Continuous <Continuous>    MEDICATIONS  (PRN):      PHYSICAL EXAM:      T(C): 37.6 (01-18-22 @ 17:04), Max: 37.6 (01-18-22 @ 17:04)  HR: 114 (01-18-22 @ 17:04) (75 - 114)  BP: 130/75 (01-18-22 @ 17:04) (129/79 - 159/74)  RR: 18 (01-18-22 @ 17:04) (16 - 18)  SpO2: 95% (01-18-22 @ 17:04) (95% - 98%)  Wt(kg): --  Lungs clear  Heart S1S2  Abd soft NT ND  Extremities:   tr edema                                    7.6    4.02  )-----------( 340      ( 18 Jan 2022 07:30 )             24.1     01-18    140  |  112<H>  |  54<H>  ----------------------------<  90  5.0   |  24  |  4.21<H>    Ca    8.9      18 Jan 2022 07:30    TPro  6.5  /  Alb  x   /  TBili  x   /  DBili  x   /  AST  x   /  ALT  x   /  AlkPhos  x   01-18      LIVER FUNCTIONS - ( 18 Jan 2022 10:52 )  Alb: x     / Pro: 6.5 g/dL / ALK PHOS: x     / ALT: x     / AST: x     / GGT: x           Creatinine Trend: 4.21<--, 4.69<--, 7.34<--        Assessment   MIESHA CKD 4; back pain  r/o paraprotein related, slowly progressing   Post renal factors     Plan  Paraprotein screen  Bladder scan noted  Urine prot: creatinine  Will follow    Tang Cano MD
                          Patient: JALEESA RAMIRES [id number] 66y Male                            Hospitalist Attending Note    No complaints.  No SOB.  No Abdominal pain, nausea, vomiting, diarrhea, nor constipation.   Denies bleeding.    ____________________PHYSICAL EXAM:  GENERAL:  NAD Alert and Oriented x 3   HEENT: NCAT  CARDIOVASCULAR:  S1, S2  LUNGS: CTAB  ABDOMEN:  soft, (-) tenderness, (-) distension, (+) bowel sounds, (-) guarding, (-) rebound (-) rigidity  EXTREMITIES:  no cyanosis / clubbing / edema.   ____________________    VITALS:  Vital Signs Last 24 Hrs  T(C): 37.1 (19 Jan 2022 11:21), Max: 37.6 (18 Jan 2022 17:04)  T(F): 98.8 (19 Jan 2022 11:21), Max: 99.7 (18 Jan 2022 17:04)  HR: 85 (19 Jan 2022 11:21) (84 - 114)  BP: 122/81 (19 Jan 2022 11:21) (116/74 - 141/63)  BP(mean): --  RR: 18 (19 Jan 2022 11:21) (18 - 19)  SpO2: 98% (19 Jan 2022 11:21) (95% - 100%) Daily     Daily   CAPILLARY BLOOD GLUCOSE      POCT Blood Glucose.: 194 mg/dL (19 Jan 2022 15:39)  POCT Blood Glucose.: 123 mg/dL (19 Jan 2022 12:40)  POCT Blood Glucose.: 107 mg/dL (19 Jan 2022 07:45)  POCT Blood Glucose.: 143 mg/dL (18 Jan 2022 21:35)    I&O's Summary    18 Jan 2022 07:01  -  19 Jan 2022 07:00  --------------------------------------------------------  IN: 120 mL / OUT: 0 mL / NET: 120 mL        LABS:                        8.2    3.92  )-----------( 354      ( 19 Jan 2022 09:18 )             26.2     01-19    141  |  111<H>  |  56<H>  ----------------------------<  110<H>  4.9   |  22  |  4.60<H>    Ca    8.6      19 Jan 2022 09:18    TPro  6.5  /  Alb  x   /  TBili  x   /  DBili  x   /  AST  x   /  ALT  x   /  AlkPhos  x   01-18      LIVER FUNCTIONS - ( 18 Jan 2022 10:52 )  Alb: x     / Pro: 6.5 g/dL / ALK PHOS: x     / ALT: x     / AST: x     / GGT: x                     MEDICATIONS:  amLODIPine   Tablet 10 milliGRAM(s) Oral daily  bisacodyl Suppository 10 milliGRAM(s) Rectal once  dextrose 40% Gel 15 Gram(s) Oral once  dextrose 5%. 1000 milliLiter(s) IV Continuous <Continuous>  dextrose 5%. 1000 milliLiter(s) IV Continuous <Continuous>  dextrose 50% Injectable 25 Gram(s) IV Push once  dextrose 50% Injectable 12.5 Gram(s) IV Push once  dextrose 50% Injectable 25 Gram(s) IV Push once  glucagon  Injectable 1 milliGRAM(s) IntraMuscular once  hydrALAZINE 50 milliGRAM(s) Oral three times a day  influenza  Vaccine (HIGH DOSE) 0.7 milliLiter(s) IntraMuscular once  insulin lispro (ADMELOG) corrective regimen sliding scale   SubCutaneous three times a day before meals  insulin lispro (ADMELOG) corrective regimen sliding scale   SubCutaneous at bedtime  polyethylene glycol 3350 17 Gram(s) Oral two times a day  senna 2 Tablet(s) Oral at bedtime  sodium chloride 0.45%. 1000 milliLiter(s) IV Continuous <Continuous>

## 2022-01-19 NOTE — DISCHARGE NOTE NURSING/CASE MANAGEMENT/SOCIAL WORK - NSDCPEFALRISK_GEN_ALL_CORE
For information on Fall & Injury Prevention, visit: [email or web address] OR  [email or web address] OR  https://www.cdc.gov/steadi/patient.html

## 2022-01-19 NOTE — DISCHARGE NOTE PROVIDER - NSDCFUADDAPPT_GEN_ALL_CORE_FT
CT A/P showed No hydronephrosis.  Diffuse bladder wall thickening, which can be seen with chronic outlet obstruction. Circumferential rectal wall thickening may suggest proctitis.  During your hospitalization, you had abnormal findings on radiologic studies.  Please see your primary doctor for followup of these findings to ensure that they have resolved.  You may require further evaluation to exclude certain serious conditions.

## 2022-01-19 NOTE — PROGRESS NOTE ADULT - ASSESSMENT
67 yo male PMH DM(II), HTN, HLD presented c/o low back pain, constipation x 3 days.  Was recently told he had AKA, but left AMA.  Cr 7.3 at that time.  Cr 4.6 on admission.  CT A/P showed No hydronephrosis.  Diffuse bladder wall thickening, which can be seen with chronic outlet obstruction. Circumferential rectal wall thickening may suggest proctitis.    # CKD Stage IV, MIESHA - Cr is stable.  Discussed with Dr. Cano.  Stable for outpatient followup and workup.  Pt in agreement.  # Bladder Wall thickening - noted on CT.  no clinical s/s of cystitis.  Supportive care.  Discussed outpatient  workup.  # Anemia - Hgb 7.6 -> 8.2.  Suspect anemia of chronic disease.  Fe profile wnl.  No s/s of acute bleeding.  Discussed need for anemia workup.  See below regarding GI workup.  # Circumferential Rectal Wall Thickening - pt reports having scheduled colonocopy last weekend, missed due to symptoms.  He has rescheduled on discharge.  Discussed need for GI workup.  He acknowledged understanding.  # Diabetes Type II - monitor fingersticks.  Insulin coverage for hyperglycemia. Metformin stopped.  FS have remained less than 200.   # Essential HTN - Monitor BP.  Continue antihypertensives.    I received a message to contact pt's daughter at [phone].  Pt asked me not to discuss his medical condition with his family.  He was able to express a clear understanding of his medical issues and need for followup.  I relayed this information to his daughter.  
67 yo male PMH DM(II), HTN, HLD presented c/o low back pain, constipation x 3 days.  Was recently told he had AKA, but left AMA.  Cr 7.3 at that time.  Cr 4.6 on admission.  CT A/P showed No hydronephrosis.  Diffuse bladder wall thickening, which can be seen with chronic outlet obstruction. Circumferential rectal wall thickening may suggest proctitis.      # CKD Stage IV, MIESHA - Cr stable.  Renal following.  Monitor K.  IVF.  # Bladder Wall thickening - noted on CT.  no clinical s/s of cystitis.  Supportive care.  Discussed outpatient  workup.  # Anemia - Hgb 7.6.  Suspect anemia of chronic disease.  Check CBC, Fe profile.  Stool OB.   # Circumferential Rectal Wall Thickening - pt reports having scheduled colonocopy last weekend, missed due to symptoms.  He has rescheduled on discharge.  Discussed need for GI workup.  He acknowledged unerstanding.  # Diabetes Type II - monitor fingersticks.  Insulin coverage for hyperglycemia. Metformin stopped.  # Essential HTN - Monitor BP.  Continue antihypertensives.

## 2022-01-19 NOTE — PROGRESS NOTE ADULT - REASON FOR ADMISSION
Low back pain with constipation and CKD

## 2022-01-19 NOTE — DISCHARGE NOTE PROVIDER - PROVIDER TOKENS
PROVIDER:[TOKEN:[[id number]]],FREE:[LAST:[Gastroenterology],PHONE:[(   )    -],FAX:[(   )    -]]

## 2022-01-19 NOTE — DISCHARGE NOTE PROVIDER - NSDCCPCAREPLAN_GEN_ALL_CORE_FT
PRINCIPAL DISCHARGE DIAGNOSIS  Diagnosis: MIESHA (acute kidney injury)  Assessment and Plan of Treatment:       SECONDARY DISCHARGE DIAGNOSES  Diagnosis: Hyperkalemia  Assessment and Plan of Treatment:     Diagnosis: CKD (chronic kidney disease), stage IV  Assessment and Plan of Treatment:     Diagnosis: Anemia of chronic disease  Assessment and Plan of Treatment:     Diagnosis: Abnormal CT of the abdomen  Assessment and Plan of Treatment:

## 2022-01-21 DIAGNOSIS — N17.9 ACUTE KIDNEY FAILURE, UNSPECIFIED: ICD-10-CM

## 2022-01-21 DIAGNOSIS — M54.50 LOW BACK PAIN, UNSPECIFIED: ICD-10-CM

## 2022-01-21 DIAGNOSIS — E87.5 HYPERKALEMIA: ICD-10-CM

## 2022-01-21 DIAGNOSIS — E78.5 HYPERLIPIDEMIA, UNSPECIFIED: ICD-10-CM

## 2022-01-21 DIAGNOSIS — N18.4 CHRONIC KIDNEY DISEASE, STAGE 4 (SEVERE): ICD-10-CM

## 2022-01-21 DIAGNOSIS — E11.9 TYPE 2 DIABETES MELLITUS WITHOUT COMPLICATIONS: ICD-10-CM

## 2022-01-21 DIAGNOSIS — D63.1 ANEMIA IN CHRONIC KIDNEY DISEASE: ICD-10-CM

## 2022-01-21 DIAGNOSIS — I12.9 HYPERTENSIVE CHRONIC KIDNEY DISEASE WITH STAGE 1 THROUGH STAGE 4 CHRONIC KIDNEY DISEASE, OR UNSPECIFIED CHRONIC KIDNEY DISEASE: ICD-10-CM

## 2022-01-21 DIAGNOSIS — K59.00 CONSTIPATION, UNSPECIFIED: ICD-10-CM

## 2022-01-24 LAB — M PROTEIN 24H UR ELPH-MRATE: SIGNIFICANT CHANGE UP

## 2022-01-26 LAB
% ALBUMIN: 40.4 % — SIGNIFICANT CHANGE UP
% ALPHA 1: 6.2 % — SIGNIFICANT CHANGE UP
% ALPHA 2: 18.6 % — SIGNIFICANT CHANGE UP
% BETA: 10.9 % — SIGNIFICANT CHANGE UP
% GAMMA: 23.9 % — SIGNIFICANT CHANGE UP
% M SPIKE: SIGNIFICANT CHANGE UP
ALBUMIN SERPL ELPH-MCNC: 2.6 G/DL — LOW (ref 3.6–5.5)
ALBUMIN/GLOB SERPL ELPH: 0.7 RATIO — SIGNIFICANT CHANGE UP
ALPHA1 GLOB SERPL ELPH-MCNC: 0.4 G/DL — SIGNIFICANT CHANGE UP (ref 0.1–0.4)
ALPHA2 GLOB SERPL ELPH-MCNC: 1.2 G/DL — HIGH (ref 0.5–1)
B-GLOBULIN SERPL ELPH-MCNC: 0.7 G/DL — SIGNIFICANT CHANGE UP (ref 0.5–1)
GAMMA GLOBULIN: 1.6 G/DL — SIGNIFICANT CHANGE UP (ref 0.6–1.6)
INTERPRETATION SERPL IFE-IMP: SIGNIFICANT CHANGE UP
M-SPIKE: SIGNIFICANT CHANGE UP (ref 0–0)
PROT PATTERN SERPL ELPH-IMP: SIGNIFICANT CHANGE UP

## 2023-01-17 ENCOUNTER — INPATIENT (INPATIENT)
Facility: HOSPITAL | Age: 68
LOS: 8 days | Discharge: HOME | End: 2023-01-26
Attending: INTERNAL MEDICINE | Admitting: INTERNAL MEDICINE
Payer: MEDICARE

## 2023-01-17 VITALS
SYSTOLIC BLOOD PRESSURE: 214 MMHG | DIASTOLIC BLOOD PRESSURE: 99 MMHG | OXYGEN SATURATION: 97 % | HEART RATE: 67 BPM | WEIGHT: 235.89 LBS | RESPIRATION RATE: 20 BRPM | HEIGHT: 69 IN | TEMPERATURE: 98 F

## 2023-01-17 DIAGNOSIS — I10 ESSENTIAL (PRIMARY) HYPERTENSION: ICD-10-CM

## 2023-01-17 DIAGNOSIS — E87.70 FLUID OVERLOAD, UNSPECIFIED: ICD-10-CM

## 2023-01-17 DIAGNOSIS — N18.9 CHRONIC KIDNEY DISEASE, UNSPECIFIED: ICD-10-CM

## 2023-01-17 DIAGNOSIS — E78.5 HYPERLIPIDEMIA, UNSPECIFIED: ICD-10-CM

## 2023-01-17 DIAGNOSIS — E87.5 HYPERKALEMIA: ICD-10-CM

## 2023-01-17 LAB
ALBUMIN SERPL ELPH-MCNC: 2.9 G/DL — LOW (ref 3.3–5)
ALP SERPL-CCNC: 103 U/L — SIGNIFICANT CHANGE UP (ref 40–120)
ALT FLD-CCNC: 26 U/L — SIGNIFICANT CHANGE UP (ref 12–78)
ANION GAP SERPL CALC-SCNC: 8 MMOL/L — SIGNIFICANT CHANGE UP (ref 5–17)
ANION GAP SERPL CALC-SCNC: 8 MMOL/L — SIGNIFICANT CHANGE UP (ref 5–17)
APTT BLD: 30.2 SEC — SIGNIFICANT CHANGE UP (ref 27.5–35.5)
AST SERPL-CCNC: 23 U/L — SIGNIFICANT CHANGE UP (ref 15–37)
BASOPHILS # BLD AUTO: 0.04 K/UL — SIGNIFICANT CHANGE UP (ref 0–0.2)
BASOPHILS NFR BLD AUTO: 0.8 % — SIGNIFICANT CHANGE UP (ref 0–2)
BILIRUB SERPL-MCNC: 0.3 MG/DL — SIGNIFICANT CHANGE UP (ref 0.2–1.2)
BUN SERPL-MCNC: 88 MG/DL — HIGH (ref 7–23)
BUN SERPL-MCNC: 88 MG/DL — HIGH (ref 7–23)
CALCIUM SERPL-MCNC: 8 MG/DL — LOW (ref 8.5–10.1)
CALCIUM SERPL-MCNC: 8.3 MG/DL — LOW (ref 8.5–10.1)
CHLORIDE SERPL-SCNC: 113 MMOL/L — HIGH (ref 96–108)
CHLORIDE SERPL-SCNC: 116 MMOL/L — HIGH (ref 96–108)
CO2 SERPL-SCNC: 22 MMOL/L — SIGNIFICANT CHANGE UP (ref 22–31)
CO2 SERPL-SCNC: 22 MMOL/L — SIGNIFICANT CHANGE UP (ref 22–31)
CREAT SERPL-MCNC: 11.6 MG/DL — HIGH (ref 0.5–1.3)
CREAT SERPL-MCNC: 11.9 MG/DL — HIGH (ref 0.5–1.3)
EGFR: 4 ML/MIN/1.73M2 — LOW
EGFR: 4 ML/MIN/1.73M2 — LOW
EOSINOPHIL # BLD AUTO: 0.88 K/UL — HIGH (ref 0–0.5)
EOSINOPHIL NFR BLD AUTO: 16.9 % — HIGH (ref 0–6)
FLUAV AG NPH QL: SIGNIFICANT CHANGE UP
FLUBV AG NPH QL: SIGNIFICANT CHANGE UP
GLUCOSE BLDC GLUCOMTR-MCNC: 186 MG/DL — HIGH (ref 70–99)
GLUCOSE SERPL-MCNC: 102 MG/DL — HIGH (ref 70–99)
GLUCOSE SERPL-MCNC: 156 MG/DL — HIGH (ref 70–99)
HCT VFR BLD CALC: 24.9 % — LOW (ref 39–50)
HGB BLD-MCNC: 7.5 G/DL — LOW (ref 13–17)
IMM GRANULOCYTES NFR BLD AUTO: 0.2 % — SIGNIFICANT CHANGE UP (ref 0–0.9)
INR BLD: 1.08 RATIO — SIGNIFICANT CHANGE UP (ref 0.88–1.16)
LYMPHOCYTES # BLD AUTO: 0.75 K/UL — LOW (ref 1–3.3)
LYMPHOCYTES # BLD AUTO: 14.4 % — SIGNIFICANT CHANGE UP (ref 13–44)
MAGNESIUM SERPL-MCNC: 1.9 MG/DL — SIGNIFICANT CHANGE UP (ref 1.6–2.6)
MCHC RBC-ENTMCNC: 29.2 PG — SIGNIFICANT CHANGE UP (ref 27–34)
MCHC RBC-ENTMCNC: 30.1 G/DL — LOW (ref 32–36)
MCV RBC AUTO: 96.9 FL — SIGNIFICANT CHANGE UP (ref 80–100)
MONOCYTES # BLD AUTO: 0.3 K/UL — SIGNIFICANT CHANGE UP (ref 0–0.9)
MONOCYTES NFR BLD AUTO: 5.7 % — SIGNIFICANT CHANGE UP (ref 2–14)
NEUTROPHILS # BLD AUTO: 3.24 K/UL — SIGNIFICANT CHANGE UP (ref 1.8–7.4)
NEUTROPHILS NFR BLD AUTO: 62 % — SIGNIFICANT CHANGE UP (ref 43–77)
NRBC # BLD: 0 /100 WBCS — SIGNIFICANT CHANGE UP (ref 0–0)
NT-PROBNP SERPL-SCNC: HIGH PG/ML (ref 0–125)
PLATELET # BLD AUTO: 168 K/UL — SIGNIFICANT CHANGE UP (ref 150–400)
POTASSIUM SERPL-MCNC: 5.6 MMOL/L — HIGH (ref 3.5–5.3)
POTASSIUM SERPL-MCNC: 6 MMOL/L — HIGH (ref 3.5–5.3)
POTASSIUM SERPL-SCNC: 5.6 MMOL/L — HIGH (ref 3.5–5.3)
POTASSIUM SERPL-SCNC: 6 MMOL/L — HIGH (ref 3.5–5.3)
PROT SERPL-MCNC: 7.2 GM/DL — SIGNIFICANT CHANGE UP (ref 6–8.3)
PROTHROM AB SERPL-ACNC: 13 SEC — SIGNIFICANT CHANGE UP (ref 10.5–13.4)
RBC # BLD: 2.57 M/UL — LOW (ref 4.2–5.8)
RBC # FLD: 14.8 % — HIGH (ref 10.3–14.5)
SARS-COV-2 RNA SPEC QL NAA+PROBE: SIGNIFICANT CHANGE UP
SODIUM SERPL-SCNC: 143 MMOL/L — SIGNIFICANT CHANGE UP (ref 135–145)
SODIUM SERPL-SCNC: 146 MMOL/L — HIGH (ref 135–145)
WBC # BLD: 5.22 K/UL — SIGNIFICANT CHANGE UP (ref 3.8–10.5)
WBC # FLD AUTO: 5.22 K/UL — SIGNIFICANT CHANGE UP (ref 3.8–10.5)

## 2023-01-17 PROCEDURE — 93970 EXTREMITY STUDY: CPT | Mod: 26

## 2023-01-17 PROCEDURE — 99233 SBSQ HOSP IP/OBS HIGH 50: CPT

## 2023-01-17 PROCEDURE — 76775 US EXAM ABDO BACK WALL LIM: CPT | Mod: 26

## 2023-01-17 PROCEDURE — 71045 X-RAY EXAM CHEST 1 VIEW: CPT | Mod: 26

## 2023-01-17 PROCEDURE — 99285 EMERGENCY DEPT VISIT HI MDM: CPT

## 2023-01-17 RX ORDER — ATENOLOL 25 MG/1
25 TABLET ORAL ONCE
Refills: 0 | Status: COMPLETED | OUTPATIENT
Start: 2023-01-17 | End: 2023-01-17

## 2023-01-17 RX ORDER — SIMVASTATIN 20 MG/1
20 TABLET, FILM COATED ORAL AT BEDTIME
Refills: 0 | Status: DISCONTINUED | OUTPATIENT
Start: 2023-01-17 | End: 2023-01-26

## 2023-01-17 RX ORDER — SODIUM ZIRCONIUM CYCLOSILICATE 10 G/10G
10 POWDER, FOR SUSPENSION ORAL ONCE
Refills: 0 | Status: COMPLETED | OUTPATIENT
Start: 2023-01-17 | End: 2023-01-17

## 2023-01-17 RX ORDER — DEXTROSE 50 % IN WATER 50 %
50 SYRINGE (ML) INTRAVENOUS ONCE
Refills: 0 | Status: COMPLETED | OUTPATIENT
Start: 2023-01-17 | End: 2023-01-17

## 2023-01-17 RX ORDER — HYDRALAZINE HCL 50 MG
50 TABLET ORAL EVERY 8 HOURS
Refills: 0 | Status: DISCONTINUED | OUTPATIENT
Start: 2023-01-17 | End: 2023-01-23

## 2023-01-17 RX ORDER — LABETALOL HCL 100 MG
10 TABLET ORAL ONCE
Refills: 0 | Status: COMPLETED | OUTPATIENT
Start: 2023-01-17 | End: 2023-01-17

## 2023-01-17 RX ORDER — ACETAMINOPHEN 500 MG
650 TABLET ORAL EVERY 6 HOURS
Refills: 0 | Status: DISCONTINUED | OUTPATIENT
Start: 2023-01-17 | End: 2023-01-26

## 2023-01-17 RX ORDER — SODIUM BICARBONATE 1 MEQ/ML
50 SYRINGE (ML) INTRAVENOUS ONCE
Refills: 0 | Status: COMPLETED | OUTPATIENT
Start: 2023-01-17 | End: 2023-01-17

## 2023-01-17 RX ORDER — AMLODIPINE BESYLATE 2.5 MG/1
10 TABLET ORAL DAILY
Refills: 0 | Status: DISCONTINUED | OUTPATIENT
Start: 2023-01-17 | End: 2023-01-26

## 2023-01-17 RX ORDER — LANOLIN ALCOHOL/MO/W.PET/CERES
3 CREAM (GRAM) TOPICAL AT BEDTIME
Refills: 0 | Status: DISCONTINUED | OUTPATIENT
Start: 2023-01-17 | End: 2023-01-26

## 2023-01-17 RX ORDER — HYDRALAZINE HCL 50 MG
10 TABLET ORAL ONCE
Refills: 0 | Status: COMPLETED | OUTPATIENT
Start: 2023-01-17 | End: 2023-01-17

## 2023-01-17 RX ORDER — INSULIN HUMAN 100 [IU]/ML
5 INJECTION, SOLUTION SUBCUTANEOUS ONCE
Refills: 0 | Status: COMPLETED | OUTPATIENT
Start: 2023-01-17 | End: 2023-01-17

## 2023-01-17 RX ORDER — FUROSEMIDE 40 MG
80 TABLET ORAL EVERY 12 HOURS
Refills: 0 | Status: DISCONTINUED | OUTPATIENT
Start: 2023-01-17 | End: 2023-01-21

## 2023-01-17 RX ORDER — ATENOLOL 25 MG/1
25 TABLET ORAL DAILY
Refills: 0 | Status: DISCONTINUED | OUTPATIENT
Start: 2023-01-17 | End: 2023-01-26

## 2023-01-17 RX ORDER — DOXAZOSIN MESYLATE 4 MG
2 TABLET ORAL AT BEDTIME
Refills: 0 | Status: DISCONTINUED | OUTPATIENT
Start: 2023-01-17 | End: 2023-01-26

## 2023-01-17 RX ORDER — CALCIUM GLUCONATE 100 MG/ML
1 VIAL (ML) INTRAVENOUS ONCE
Refills: 0 | Status: COMPLETED | OUTPATIENT
Start: 2023-01-17 | End: 2023-01-17

## 2023-01-17 RX ORDER — FUROSEMIDE 40 MG
40 TABLET ORAL ONCE
Refills: 0 | Status: COMPLETED | OUTPATIENT
Start: 2023-01-17 | End: 2023-01-17

## 2023-01-17 RX ADMIN — Medication 40 MILLIGRAM(S): at 15:10

## 2023-01-17 RX ADMIN — Medication 100 GRAM(S): at 15:10

## 2023-01-17 RX ADMIN — SODIUM ZIRCONIUM CYCLOSILICATE 10 GRAM(S): 10 POWDER, FOR SUSPENSION ORAL at 15:19

## 2023-01-17 RX ADMIN — Medication 10 MILLIGRAM(S): at 17:11

## 2023-01-17 RX ADMIN — Medication 80 MILLIGRAM(S): at 17:12

## 2023-01-17 RX ADMIN — Medication 50 MILLIGRAM(S): at 22:24

## 2023-01-17 RX ADMIN — Medication 10 MILLIGRAM(S): at 15:10

## 2023-01-17 RX ADMIN — Medication 10 MILLIGRAM(S): at 17:12

## 2023-01-17 RX ADMIN — ATENOLOL 25 MILLIGRAM(S): 25 TABLET ORAL at 15:11

## 2023-01-17 RX ADMIN — INSULIN HUMAN 5 UNIT(S): 100 INJECTION, SOLUTION SUBCUTANEOUS at 15:19

## 2023-01-17 RX ADMIN — Medication 50 MILLIGRAM(S): at 16:41

## 2023-01-17 RX ADMIN — Medication 2 MILLIGRAM(S): at 22:24

## 2023-01-17 RX ADMIN — Medication 50 MILLIEQUIVALENT(S): at 15:10

## 2023-01-17 RX ADMIN — Medication 50 MILLILITER(S): at 15:11

## 2023-01-17 RX ADMIN — AMLODIPINE BESYLATE 10 MILLIGRAM(S): 2.5 TABLET ORAL at 16:42

## 2023-01-17 RX ADMIN — SIMVASTATIN 20 MILLIGRAM(S): 20 TABLET, FILM COATED ORAL at 22:24

## 2023-01-17 NOTE — H&P ADULT - NSHPPHYSICALEXAM_GEN_ALL_CORE
CONSTITUTIONAL: alert and cooperative, no acute distress  EYES: PERRL, no scleral icterus  ENT: Mucosa moist, tongue normal  NECK: Neck supple, trachea midline, non-tender.  CARDIAC: Normal S1 and S2. Regular rate and rhythms. No murmurs. Pedal edema ++  LUNGS: Equal air entry both lungs. Slight basilar crackles +. Increase respiratory effort.   ABDOMEN: Soft, nondistended, nontender. No guarding or rebound tenderness. No hepatomegaly or splenomegaly. Bowel sound normal  MUSCULOSKELETAL: Normocephalic, atraumatic. No significant deformity or joint abnormality  NEUROLOGICAL: No gross motor or sensory deficits  SKIN: no lesions or eruptions. Normal turgor  PSYCHIATRIC: A&O x 3, appropriate mood and affect (1) body pink, extremities blue

## 2023-01-17 NOTE — ED PROVIDER NOTE - OBJECTIVE STATEMENT
67 years old male here c/o swelling bilateral legs pt was seen by pmd Dr. Bowens and started Furosamide 20 mg qd four days ago. Pt also c/o exertional sob unable to walk one full block. and sts pt has to sit up to at night. Pt denies recent hx of long traveling, hx of blood clot, headache, dizziness, blurred visions, light sensitivities, focal/distal weakness or numbness, neck/back/hips/calfs pain, cough chest pain, nausea, vomiting, fever, chills, abd pain, dysuria, or irregular bowel movements. Pt sts he has hx renal disease and hypertension but did not take his meds this morning.

## 2023-01-17 NOTE — ED PROVIDER NOTE - CARE PLAN
Principal Discharge DX:	Acute on chronic kidney failure  Secondary Diagnosis:	Hyperkalemia  Secondary Diagnosis:	Acute CHF   1

## 2023-01-17 NOTE — H&P ADULT - PROBLEM SELECTOR PLAN 2
Hypertensive to 214/99  Received IV hydralazine 10mg and atenolol 25mg oral in ED  Continue amlodipine 10mg, hydralazine 50mg tid, doxazosin 2mg and continue with atenolol 25mg daily  IV lasix 80mg bid  Monitor BP and titrate BP meds

## 2023-01-17 NOTE — ED ADULT NURSE NOTE - ED STAT RN HANDOFF DETAILS
Care handoff given to ED hold ADOLPH Alford. Patient is in stable condition, in no acute distress at this time.

## 2023-01-17 NOTE — H&P ADULT - PROBLEM SELECTOR PLAN 1
MIESHA on CKD vs progressing CKD to ESRD now  IV lasix 80mg bid for fluid overload  Nephrology consulted  Monitor BP and titrate BP med  Renal diet

## 2023-01-17 NOTE — H&P ADULT - ASSESSMENT
67 years old male with h/o HTN, HLD, CKD present to ED with complain of worsening LE edema, SOB and abnormal labs results with elevated Cr. Patient was seen by PCP, just started on lasix 20mg 4 days ago. Was informed to come to ED on Friday but patient unable to come to ED due to some issue at home. Reported progressive SOB on exertion with orthopnea and PND. No chest pain.   Hypertensive to 214/99, afebrile, sat well at RA. No leukocytosis, Hb 7.5, plt 168, Na 146, K 6, Cr 11.9, proBNP 25000. CXR image reviewed, no focal consolidation Noted central pul venous congestion. LE doppler negative for DVT. Ultrasound kidney with no hydronephrosis. Renal parenchymal disease. Possible chronic bladder outlet obstruction.     Admitted with MIESHA on CKD/progressing to ESRD, accelerated HTN, fluid overload, hyperkalemia

## 2023-01-17 NOTE — ED PROVIDER NOTE - NSICDXPASTMEDICALHX_GEN_ALL_CORE_FT
PAST MEDICAL HISTORY:  Diabetes     HTN (hypertension)      PAST MEDICAL HISTORY:  Diabetes     HTN (hypertension)     Renal insufficiency

## 2023-01-17 NOTE — CONSULT NOTE ADULT - SUBJECTIVE AND OBJECTIVE BOX
Patient chart reviewed, full consult to follow.   Patient resistant to initiating HD;   Discussed degree of CKD, high risk for uremic complications;   Will medically manage and resume discussion within 24 hrs    Thank you for the courtesy of this consultation.         Patient chart reviewed, full consult to follow.   Patient resistant to initiating HD;   MIESHA CKD 5; acute on chronic HTN nephrosclerosis; diabetic nephropathy;   Last Cr in July 2022 4.6, did not follow up until last week with [facility].  Refused to see a nephrologist.   Discussed degree of CKD, high risk for uremic complications;   Will medically manage and resume discussion within 24 hrs    Thank you for the courtesy of this consultation. [facility] NEPHROLOG[facility], [facility]  NEPHROLOGY AND HYPERTENSION  [address]  [address]  [place] [address]  [phone]    MD MAUREEN SANCHEZ MD YELENA ROSENBERG, MD BINNY KOSHY, MD CHRISTOPHER CAPUTO, MD EDWARD BOVER, MD      Information from chart:  "Patient is a 67y old  Male who presents with a chief complaint of MIESHA on CKD, likely ESRD. accelerated hypertension (17 Jan 2023 16:26)    HPI:  67 years old male with h/o HTN, HLD, CKD present to ED with complain of worsening LE edema, SOB and abnormal labs results with elevated Cr. Patient was seen by PCP, just started on lasix 20mg 4 days ago. Was informed to come to ED on Friday but patient unable to come to ED due to some issue at home. Reported progressive SOB on exertion with orthopnea and PND. No chest pain.   Hypertensive to 214/99, afebrile, sat well at RA. No leukocytosis, Hb 7.5, plt 168, Na 146, K 6, Cr 11.9, proBNP 44859. CXR image reviewed, no focal consolidation Noted central pul venous congestion. LE doppler negative for DVT. Ultrasound kidney with no hydronephrosis. Renal parenchymal disease. Possible chronic bladder outlet obstruction.    (17 Jan 2023 16:26)   "  Last Cr in July 2022 4.6, did not follow up until last week with [facility].      Patient resistant to initiating HD;     Refused to see a nephrologist.       PAST MEDICAL & SURGICAL HISTORY:  Diabetes      HTN (hypertension)      Renal insufficiency      No significant past surgical history        FAMILY HISTORY:    Allergies    No Known Allergies    Intolerances      Home Medications:  atenolol 25 mg oral tablet: 1 tab(s) orally once a day (17 Jan 2023 13:26)  gabapentin 300 mg oral tablet:  (17 Jan 2023 13:26)  hydrALAZINE 50 mg oral tablet: 1 tab(s) orally 4 times a day (17 Jan 2023 13:26)  simvastatin 20 mg oral tablet: 1 tab(s) orally once a day (at bedtime) (17 Jan 2023 13:26)    MEDICATIONS  (STANDING):  amLODIPine   Tablet 10 milliGRAM(s) Oral daily  atenolol  Tablet 25 milliGRAM(s) Oral daily  doxazosin 2 milliGRAM(s) Oral at bedtime  furosemide   Injectable 80 milliGRAM(s) IV Push every 12 hours  hydrALAZINE 50 milliGRAM(s) Oral every 8 hours  simvastatin 20 milliGRAM(s) Oral at bedtime    MEDICATIONS  (PRN):  acetaminophen     Tablet .. 650 milliGRAM(s) Oral every 6 hours PRN Mild Pain (1 - 3), Moderate Pain (4 - 6)  melatonin 3 milliGRAM(s) Oral at bedtime PRN Insomnia    Vital Signs Last 24 Hrs  T(C): 36.8 (17 Jan 2023 09:44), Max: 36.8 (17 Jan 2023 09:44)  T(F): 98.2 (17 Jan 2023 09:44), Max: 98.2 (17 Jan 2023 09:44)  HR: 65 (17 Jan 2023 18:55) (61 - 67)  BP: 174/72 (17 Jan 2023 18:55) (174/72 - 240/107)  BP(mean): --  RR: 16 (17 Jan 2023 18:55) (16 - 20)  SpO2: 98% (17 Jan 2023 18:55) (97% - 98%)    Parameters below as of 17 Jan 2023 18:55  Patient On (Oxygen Delivery Method): room air        Daily Height in cm: 175.26 (17 Jan 2023 09:44)    Daily     CAPILLARY BLOOD GLUCOSE      POCT Blood Glucose.: 186 mg/dL (17 Jan 2023 15:27)    PHYSICAL EXAM:      T(C): 36.8 (01-17-23 @ 09:44), Max: 36.8 (01-17-23 @ 09:44)  HR: 65 (01-17-23 @ 18:55) (61 - 67)  BP: 174/72 (01-17-23 @ 18:55) (174/72 - 240/107)  RR: 16 (01-17-23 @ 18:55) (16 - 20)  SpO2: 98% (01-17-23 @ 18:55) (97% - 98%)  Wt(kg): --  Lungs clear  Heart S1S2  Abd soft NT ND  Extremities:   tr edema        < from: US Renal (01.17.23 @ 16:07) >    IMPRESSION:  No evidence of hydronephrosis.  Renal parenchymal disease.  Possibility of chronic bladder outlet obstruction.    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      Plan  Discussed degree of CKD, high risk for uremic complications;   Judicious diuresis  Paraprotein studies;   Will medically manage and resume discussion within 24 hrs      Tang Cano MD              Thank you for the courtesy of this consultation.

## 2023-01-17 NOTE — ED ADULT NURSE NOTE - CHIEF COMPLAINT QUOTE
Swelling of legs and feetfor days, seen by Dr Linda Bowens on Friday sent to ED on Sat but needed to settle up some home issues first, abnormal labs kidney failure noted H/O HTN, DM  Didn't take morning meds at home today

## 2023-01-17 NOTE — ED ADULT TRIAGE NOTE - CHIEF COMPLAINT QUOTE
Swelling of legs and feet, seen by Dr Linda Bowens on Friday sent to ED on Sat but needed to settle up some home issues first, abnormal labs kidney failure noted H/O HTN,DM Swelling of legs and feetfor days, seen by Dr Linda Bowens on Friday sent to ED on Sat but needed to settle up some home issues first, abnormal labs kidney failure noted H/O HTN, DM  Didn't take morning meds at home today

## 2023-01-17 NOTE — ED PROVIDER NOTE - CONSTITUTIONAL, MLM
normal... Well appearing, awake, alert, oriented to person, place, time/situation and in no apparent distress. Speaking in clear full sentences no nasal flaring Well appearing, awake, alert, oriented to person, place, time/situation and in no apparent distress. Speaking in clear full sentences no nasal flaring no shoulders retractions no diaphoresis

## 2023-01-17 NOTE — ED ADULT NURSE NOTE - NSFALLRSKHARMRISK_ED_ALL_ED
Patient returned the holter monitor.  It was mostly artifact.  She will return on 2/14/20 to have another holter placed.   Please do not bill on this encounter.    no

## 2023-01-17 NOTE — H&P ADULT - PROBLEM SELECTOR PLAN 4
Received oral Lokelma and IV cocktails  Also on IV lasix 80mg bid  Telemetry monitoring  Repeat BMP in PM  Nephrology consulted

## 2023-01-17 NOTE — H&P ADULT - HISTORY OF PRESENT ILLNESS
67 years old male with h/o HTN, HLD, CKD present to ED with complain of worsening LE edema, SOB and abnormal labs results with elevated Cr. Patient was seen by PCP, just started on lasix 20mg 4 days ago. Was informed to come to ED on Friday but patient unable to come to ED due to some issue at home. Reported progressive SOB on exertion with orthopnea and PND. No chest pain.   Hypertensive to 214/99, afebrile, sat well at RA. No leukocytosis, Hb 7.5, plt 168, Na 146, K 6, Cr 11.9, proBNP 02528. CXR image reviewed, no focal consolidation Noted central pul venous congestion. LE doppler negative for DVT. Ultrasound kidney with no hydronephrosis. Renal parenchymal disease. Possible chronic bladder outlet obstruction.

## 2023-01-17 NOTE — ED PROVIDER NOTE - CLINICAL SUMMARY MEDICAL DECISION MAKING FREE TEXT BOX
hx, exam, ekg, labs, cxr, nephrology consult Pt remain very comfortable no respiratory distress no headache, no nausea, vomiting no sob.

## 2023-01-17 NOTE — ED PROVIDER NOTE - PROGRESS NOTE DETAILS
Pt remains alert and oriented x 3 speaking in clear full sentences appears very comfortable lying in the stretcher no focal neuro deficits denies headache, dizziness, neck/back pain, focal/distal weakness or numbness, sob, chest pain, nausea, vomiting, abd pain. Dr. Khanna is notified and admit pt.

## 2023-01-18 LAB
A1C WITH ESTIMATED AVERAGE GLUCOSE RESULT: 5.5 % — SIGNIFICANT CHANGE UP (ref 4–5.6)
ABO RH CONFIRMATION: SIGNIFICANT CHANGE UP
ALBUMIN SERPL ELPH-MCNC: 2.4 G/DL — LOW (ref 3.3–5)
ALP SERPL-CCNC: 88 U/L — SIGNIFICANT CHANGE UP (ref 40–120)
ALT FLD-CCNC: 18 U/L — SIGNIFICANT CHANGE UP (ref 12–78)
ANION GAP SERPL CALC-SCNC: 8 MMOL/L — SIGNIFICANT CHANGE UP (ref 5–17)
AST SERPL-CCNC: 16 U/L — SIGNIFICANT CHANGE UP (ref 15–37)
BILIRUB SERPL-MCNC: 0.2 MG/DL — SIGNIFICANT CHANGE UP (ref 0.2–1.2)
BLD GP AB SCN SERPL QL: SIGNIFICANT CHANGE UP
BUN SERPL-MCNC: 90 MG/DL — HIGH (ref 7–23)
CALCIUM SERPL-MCNC: 7.7 MG/DL — LOW (ref 8.5–10.1)
CALCIUM SERPL-MCNC: 8 MG/DL — LOW (ref 8.4–10.5)
CHLORIDE SERPL-SCNC: 113 MMOL/L — HIGH (ref 96–108)
CHOLEST SERPL-MCNC: 110 MG/DL — SIGNIFICANT CHANGE UP
CO2 SERPL-SCNC: 22 MMOL/L — SIGNIFICANT CHANGE UP (ref 22–31)
CREAT ?TM UR-MCNC: 48 MG/DL — SIGNIFICANT CHANGE UP
CREAT SERPL-MCNC: 11.8 MG/DL — HIGH (ref 0.5–1.3)
EGFR: 4 ML/MIN/1.73M2 — LOW
ESTIMATED AVERAGE GLUCOSE: 111 MG/DL — SIGNIFICANT CHANGE UP (ref 68–114)
FERRITIN SERPL-MCNC: 105 NG/ML — SIGNIFICANT CHANGE UP (ref 30–400)
FOLATE SERPL-MCNC: 11.8 NG/ML — SIGNIFICANT CHANGE UP
GLUCOSE SERPL-MCNC: 133 MG/DL — HIGH (ref 70–99)
HCT VFR BLD CALC: 21.2 % — LOW (ref 39–50)
HDLC SERPL-MCNC: 39 MG/DL — LOW
HGB BLD-MCNC: 6.4 G/DL — CRITICAL LOW (ref 13–17)
IGA FLD-MCNC: 125 MG/DL — SIGNIFICANT CHANGE UP (ref 84–499)
IGG FLD-MCNC: 1276 MG/DL — SIGNIFICANT CHANGE UP (ref 610–1660)
IGM SERPL-MCNC: 69 MG/DL — SIGNIFICANT CHANGE UP (ref 35–242)
IRON SATN MFR SERPL: 15 % — LOW (ref 16–55)
IRON SATN MFR SERPL: 30 UG/DL — LOW (ref 45–165)
KAPPA LC SER QL IFE: 20.8 MG/DL — HIGH (ref 0.33–1.94)
KAPPA/LAMBDA FREE LIGHT CHAIN RATIO, SERUM: 1.65 RATIO — SIGNIFICANT CHANGE UP (ref 0.26–1.65)
LAMBDA LC SER QL IFE: 12.61 MG/DL — HIGH (ref 0.57–2.63)
LIPID PNL WITH DIRECT LDL SERPL: 60 MG/DL — SIGNIFICANT CHANGE UP
MAGNESIUM SERPL-MCNC: 1.8 MG/DL — SIGNIFICANT CHANGE UP (ref 1.6–2.6)
MCHC RBC-ENTMCNC: 28.8 PG — SIGNIFICANT CHANGE UP (ref 27–34)
MCHC RBC-ENTMCNC: 30.2 G/DL — LOW (ref 32–36)
MCV RBC AUTO: 95.5 FL — SIGNIFICANT CHANGE UP (ref 80–100)
NON HDL CHOLESTEROL: 71 MG/DL — SIGNIFICANT CHANGE UP
NRBC # BLD: 0 /100 WBCS — SIGNIFICANT CHANGE UP (ref 0–0)
PHOSPHATE SERPL-MCNC: 5.7 MG/DL — HIGH (ref 2.5–4.5)
PLATELET # BLD AUTO: 135 K/UL — LOW (ref 150–400)
POTASSIUM SERPL-MCNC: 5.7 MMOL/L — HIGH (ref 3.5–5.3)
POTASSIUM SERPL-SCNC: 5.7 MMOL/L — HIGH (ref 3.5–5.3)
PROT ?TM UR-MCNC: 338 MG/DL — HIGH (ref 0–12)
PROT SERPL-MCNC: 5.4 G/DL — LOW (ref 6–8.3)
PROT SERPL-MCNC: 5.4 G/DL — LOW (ref 6–8.3)
PROT SERPL-MCNC: 5.7 GM/DL — LOW (ref 6–8.3)
PROT/CREAT UR-RTO: 7 RATIO — HIGH (ref 0–0.2)
PTH-INTACT FLD-MCNC: 434 PG/ML — HIGH (ref 15–65)
RBC # BLD: 2.22 M/UL — LOW (ref 4.2–5.8)
RBC # FLD: 14.6 % — HIGH (ref 10.3–14.5)
SODIUM SERPL-SCNC: 143 MMOL/L — SIGNIFICANT CHANGE UP (ref 135–145)
TIBC SERPL-MCNC: 200 UG/DL — LOW (ref 220–430)
TRIGL SERPL-MCNC: 54 MG/DL — SIGNIFICANT CHANGE UP
UIBC SERPL-MCNC: 170 UG/DL — SIGNIFICANT CHANGE UP (ref 110–370)
VIT B12 SERPL-MCNC: 794 PG/ML — SIGNIFICANT CHANGE UP (ref 232–1245)
WBC # BLD: 4.83 K/UL — SIGNIFICANT CHANGE UP (ref 3.8–10.5)
WBC # FLD AUTO: 4.83 K/UL — SIGNIFICANT CHANGE UP (ref 3.8–10.5)

## 2023-01-18 PROCEDURE — 93306 TTE W/DOPPLER COMPLETE: CPT | Mod: 26

## 2023-01-18 PROCEDURE — 99233 SBSQ HOSP IP/OBS HIGH 50: CPT

## 2023-01-18 RX ORDER — SODIUM ZIRCONIUM CYCLOSILICATE 10 G/10G
10 POWDER, FOR SUSPENSION ORAL EVERY 8 HOURS
Refills: 0 | Status: COMPLETED | OUTPATIENT
Start: 2023-01-18 | End: 2023-01-19

## 2023-01-18 RX ADMIN — SIMVASTATIN 20 MILLIGRAM(S): 20 TABLET, FILM COATED ORAL at 21:31

## 2023-01-18 RX ADMIN — AMLODIPINE BESYLATE 10 MILLIGRAM(S): 2.5 TABLET ORAL at 05:29

## 2023-01-18 RX ADMIN — Medication 50 MILLIGRAM(S): at 05:28

## 2023-01-18 RX ADMIN — Medication 80 MILLIGRAM(S): at 17:16

## 2023-01-18 RX ADMIN — Medication 50 MILLIGRAM(S): at 21:32

## 2023-01-18 RX ADMIN — Medication 80 MILLIGRAM(S): at 05:25

## 2023-01-18 RX ADMIN — ATENOLOL 25 MILLIGRAM(S): 25 TABLET ORAL at 05:29

## 2023-01-18 RX ADMIN — Medication 50 MILLIGRAM(S): at 14:25

## 2023-01-18 RX ADMIN — Medication 2 MILLIGRAM(S): at 21:32

## 2023-01-18 RX ADMIN — SODIUM ZIRCONIUM CYCLOSILICATE 10 GRAM(S): 10 POWDER, FOR SUSPENSION ORAL at 21:31

## 2023-01-18 RX ADMIN — Medication 650 MILLIGRAM(S): at 19:19

## 2023-01-18 NOTE — PATIENT PROFILE ADULT - SAFE PLACE TO LIVE
COVID-19 Outpatient Progress Note    Assessment/Plan:    Problem List Items Addressed This Visit    None  Visit Diagnoses     COVID-19    -  Primary    Relevant Medications    nirmatrelvir & ritonavir (Paxlovid, 300/100,) tablet therapy pack         COVID-19 Plan    Encounter provider: Payal Bowers DO     Provider located at: [address] [address]     Recent Visits  No visits were found meeting these conditions  Showing recent visits within past 7 days and meeting all other requirements  Today's Visits  Date Type Provider Dept   01/18/23 Telephone Maida Bird LPN [facility]   Showing today's visits and meeting all other requirements  Future Appointments  No visits were found meeting these conditions  Showing future appointments within next 150 days and meeting all other requirements     COVID-19 HPI  Lab Results   Component Value Date    SARSCOV2 Negative 12/07/2021       Review of Systems  Current Outpatient Medications on File Prior to Visit   Medication Sig   • allopurinol (ZYLOPRIM) 100 mg tablet TAKE 1 TABLET DAILY   • b complex vitamins capsule Take 1 capsule by mouth daily   • Cholecalciferol (VITAMIN D PO) Take 1 tablet by mouth daily   • citalopram (CeleXA) 40 mg tablet TAKE 1 TABLET DAILY   • lisinopril (ZESTRIL) 5 mg tablet TAKE 1 TABLET DAILY   • omeprazole (PriLOSEC) 20 mg delayed release capsule TAKE 1 CAPSULE DAILY   • simvastatin (ZOCOR) 20 mg tablet TAKE 1 TABLET DAILY   • traZODone (DESYREL) 50 mg tablet Take 1 tablet (50 mg total) by mouth daily at bedtime   • zinc gluconate 50 mg tablet Take 50 mg by mouth daily       Objective: There were no vitals taken for this visit       Physical Exam  Payal Bowers DO no

## 2023-01-18 NOTE — PROGRESS NOTE ADULT - SUBJECTIVE AND OBJECTIVE BOX
Patient is a 67y old  Male who presents with a chief complaint of MIESHA on CKD, likely ESRD. accelerated hypertension (17 Jan 2023 16:26)      INTERVAL HPI/OVERNIGHT EVENTS: none     MEDICATIONS  (STANDING):  amLODIPine   Tablet 10 milliGRAM(s) Oral daily  atenolol  Tablet 25 milliGRAM(s) Oral daily  doxazosin 2 milliGRAM(s) Oral at bedtime  furosemide   Injectable 80 milliGRAM(s) IV Push every 12 hours  hydrALAZINE 50 milliGRAM(s) Oral every 8 hours  simvastatin 20 milliGRAM(s) Oral at bedtime    MEDICATIONS  (PRN):  acetaminophen     Tablet .. 650 milliGRAM(s) Oral every 6 hours PRN Mild Pain (1 - 3), Moderate Pain (4 - 6)  melatonin 3 milliGRAM(s) Oral at bedtime PRN Insomnia      Allergies    No Known Allergies    Intolerances        REVIEW OF SYSTEMS:  CONSTITUTIONAL: No fever, weight loss  EYES: No eye pain, visual disturbances, or discharge  ENMT:  No difficulty hearing, tinnitus, vertigo; No sinus or throat pain  RESPIRATORY: No cough, wheezing, chills or hemoptysis; No shortness of breath  CARDIOVASCULAR: No chest pain, palpitations, dizziness, or leg swelling  GASTROINTESTINAL: No abdominal or epigastric pain. No nausea, vomiting, or hematemesis; No diarrhea or constipation. No melena or hematochezia.  GENITOURINARY: No dysuria, frequency, hematuria, or incontinence  NEUROLOGICAL: No headaches, memory loss, loss of strength, numbness, or tremors  SKIN: No itching, burning, rashes, or lesions   MUSCULOSKELETAL: No joint pain or swelling; No muscle, back, or extremity pain  PSYCHIATRIC: No depression, anxiety, mood swings, or difficulty sleeping  HEME/LYMPH: No easy bruising, or bleeding gums      Vital Signs Last 24 Hrs  T(C): 36.8 (01-18-23 @ 11:37), Max: 36.9 (01-18-23 @ 08:26)  T(F): 98.2 (01-18-23 @ 11:37), Max: 98.5 (01-18-23 @ 08:26)  HR: 63 (01-18-23 @ 11:37) (60 - 66)  BP: 133/47 (01-18-23 @ 11:37) (133/47 - 240/107)  BP(mean): --  RR: 15 (01-18-23 @ 11:37) (11 - 18)  SpO2: 94% (01-18-23 @ 11:37) (94% - 99%)        PHYSICAL EXAM:  GENERAL: NAD  HEAD:  Atraumatic, Normocephalic  EYES: EOMI, PERRLA, conjunctiva and sclera clear  ENMT: No tonsillar erythema, exudates, or enlargement;   NECK: Supple, Normal thyroid  NERVOUS SYSTEM:  Alert & Oriented X3, Good concentration; Motor Strength 5/5 B/L upper and lower extremities; DTRs 2+ intact and symmetric  CHEST/LUNG: CTABL; No rales, rhonchi, wheezing, or rubs  HEART: Regular rate and rhythm; No murmurs, rubs, or gallops  ABDOMEN: Soft, Nontender, Nondistended; Bowel sounds present  EXTREMITIES:  2+ Peripheral Pulses, No clubbing, cyanosis, or edema  LYMPH: No lymphadenopathy noted  SKIN: No rashes or lesions    LABS:                        6.4    4.83  )-----------( 135      ( 18 Jan 2023 05:40 )             21.2     01-18    143  |  113<H>  |  90<H>  ----------------------------<  133<H>  5.7<H>   |  22  |  11.80<H>    Ca    7.7<L>      18 Jan 2023 05:40  Phos  5.7     01-18  Mg     1.8     01-18    TPro  5.4<L>  /  Alb  2.4<L>  /  TBili  0.2  /  DBili  x   /  AST  16  /  ALT  18  /  AlkPhos  88  01-18    PT/INR - ( 17 Jan 2023 13:12 )   PT: 13.0 sec;   INR: 1.08 ratio         PTT - ( 17 Jan 2023 13:12 )  PTT:30.2 sec    CAPILLARY BLOOD GLUCOSE      POCT Blood Glucose.: 186 mg/dL (17 Jan 2023 15:27)      RADIOLOGY & ADDITIONAL TESTS:    Imaging Personally Reviewed:  [ ] YES  [ ] NO    Consultant(s) Notes Reviewed:  [ ] YES  [ ] NO    Care Discussed with Consultants/Other Providers [ ] YES  [ ] NO

## 2023-01-18 NOTE — PATIENT PROFILE ADULT - FALL HARM RISK - HARM RISK INTERVENTIONS

## 2023-01-18 NOTE — ED ADULT NURSE REASSESSMENT NOTE - NS ED NURSE REASSESS COMMENT FT1
patient received sleeping comfortable in stretcher, easily arousable. alert and oriented x4, denies pain or discomfort at this moment. pt admitted, awaiting a bed.

## 2023-01-18 NOTE — CHART NOTE - NSCHARTNOTEFT_GEN_A_CORE
House- Medicine NP:    Called by RN to obtain consent for blood transfusion.   Patient is a 67y old  Male who presents with a chief complaint of MIESHA on CKD, likely ESRD. accelerated hypertension (17 Jan 2023 16:26)                          6.4    4.83  )-----------( 135      ( 18 Jan 2023 05:40 )             21.2     Discussed with patient regarding the need for blood transfusion. Risk and benefits discussed. Risks including fever, chills/rigors, high or low blood pressure, respiratory distress (wheezing/hypoxia), urticaria/rash/edema, nausea, pain, bleeding, darkened urine, lower back pain, severe allergic reaction and death was discussed. Verbalizes the understanding and consent obtained. Witnessed by staff.

## 2023-01-18 NOTE — PROGRESS NOTE ADULT - PROBLEM SELECTOR PLAN 1
MIESHA on CKD vs progressing CKD to ESRD now  IV lasix bid for fluid overload  Nephrology consulted  will place matos for possible outlet obstruction   - urinating well   Monitor BP and titrate BP med  Renal diet  will transfuse one unit prbc

## 2023-01-18 NOTE — PROGRESS NOTE ADULT - PROBLEM SELECTOR PLAN 2
Hypertensive to 214/99  Received IV hydralazine 10mg and atenolol 25mg oral in ED  Continue amlodipine 10mg, hydralazine 50mg tid, doxazosin 2mg and continue with atenolol 25mg daily  IV lasix   Monitor BP and titrate BP meds

## 2023-01-18 NOTE — PATIENT PROFILE ADULT - PRO INTERPRETER NEED 2
Chief complaint:   Chief Complaint   Patient presents with   • Follow-up     left arm pain , states feels pain in chest occassionally        Vitals:  Visit Vitals  /62 (BP Location: RUE - Right upper extremity, Patient Position: Sitting, Cuff Size: Large Adult)   Pulse 66   Temp 97.1 °F (36.2 °C) (Oral)   Ht 5' 2\" (1.575 m)   Wt 93.6 kg   SpO2 98%   BMI 37.75 kg/m²       HISTORY OF PRESENT ILLNESS     HPI       Patient presents for follow up visit.      L biceps area pain, in lower biceps tendon area as well as upper long tendon. Possibly due to carrying things, laundry up and down stairs. Tylenol, heat prn. She defers PT     Hx atypical non-exertional L CP, none now. She decline dekg, plans to see Cardiology next week.       ...  Systolic CHF, non-ischemic CMP EF 30%. Cardiology Dr Whittington follows and manages her diuretics.   S/p defibrillator placement 11/2015. EP follows.     Hypertension, usually well controlled    Obesity.   Pre-DM. Diet, wt loss advised.     GERD, frequent reflux, laryngeal irritation. She is not on any rx.     Vit D deficiency, advised OTC vit D 2000 IU 2 caps/day.     CRI, Cr 1.1-1.39. She is not using any nsaids. Fluid intake was low. Nephrology consult advised.     Chronic anemia. Hb improved. No vaginal bleeding. No blood in stool, no melena. No abd pain.   B12 was borderline low.     Hx positional vertigo.     Hx asthma, occasional sob.     LBP B leg pain, pain radiates from B buttock into thigh and lower leg. Pain is worse with standing. Tylenol provides partial relief. No numbness.   She occasionally uses zanaflex and also rarely needs tramadol, requests refill.     Hx intermittent B hand numbness, usually in am when she wakes up, more on R hand.              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) 01/26/2016     Priority: Low   • Gastroesophageal reflux disease 01/25/2016     Priority: Low   • [place] Single Chamber ICD 11/23/2015 12/01/2015     Priority: Low     HAS REMOTE FOLLOW UP  Implanted 11/23/2015 for Cardiomyopathy and Primary Prevention.      • Ischemic cardiomyopathy (LV non compaction) 11/03/2014     Priority: Low     9/28/18 ECHO LVEF: 35%, IVS: 1.1 cm, LVPW: 1.4 cm,  LEON: 24.9  EF = 30% per Echo 7/18/2017  EF = 30% per Echo 8/10/2015 ( LV non-compaction)  EF = 30% per Echo 10/19/2014  Normal NM perfusion scan 1/5/2015       • Essential hypertension, benign 11/03/2014     Priority: Low   • Postmenopausal bleeding 10/15/2013     Priority: Low       PAST MEDICAL, FAMILY AND SOCIAL HISTORY     Current Outpatient Medications   Medication Sig Dispense Refill   • lisinopril (ZESTRIL) 40 MG tablet Take 1 tablet by mouth daily. 90 tablet 1   • tiZANidine (ZANAFLEX) 2 MG tablet Take 1 tablet by mouth every 8 hours as needed (muscle spasm). 30 tablet 5   • traMADol (ULTRAM) 50 MG tablet Take 1-2 tablets by mouth every 6 hours as needed for Pain. 60 tablet 5   • carvedilol (COREG) 25 MG tablet Take one-HALF tablet by mouth 2 times daily. 90 tablet 3   • spironolactone (ALDACTONE) 25 MG tablet Take 1 tablet by mouth daily. 90 tablet 1   • furosemide (LASIX) 40 MG tablet TAKE 1 TABLET BY MOUTH DAILY 90 tablet 1   • Cholecalciferol (VITAMIN D) 50 mcg (2,000 units) capsule Take 2 capsules by mouth daily.     • hydrALAZINE  (APRESOLINE) 25 MG tablet Take 1 tablet by mouth 3 times daily. 270 tablet 3   • albuterol (PROAIR HFA) 108 (90 Base) MCG/ACT inhaler Inhale 2 puffs into the lungs four times daily. 3 Inhaler 3   • Multiple Vitamins-Minerals (MULTIVITAMIN ADULT) Tab Take 1 tablet by mouth daily.     • lidocaine (XYLOCAINE) 5 % ointment Apply 2-4 gm (4-6 inches) topically to the affected area daily for 12 hours. 35.44 g 2   • Cyanocobalamin (B-12) 1000 MCG Cap Take 1 capsule by mouth daily. 30 capsule 3   • albuterol (VENTOLIN) (2.5 MG/3ML) 0.083% nebulizer solution Take 3 mLs by nebulization every 6 hours as needed for Wheezing. 375 mL 12     No current facility-administered medications for this visit.        Allergies:  ALLERGIES:   Allergen Reactions   • Codeine Nausea & Vomiting   • Iodine   (Environmental Or Med) HIVES       Past Medical  History/Surgeries:  Past Medical History:   Diagnosis Date   • Anemia     Iron def.   • Arthritis     knee;generalized   • Cardiomyopathy (CMS/HCC) 11/2015    EF 30%   • Carpal tunnel syndrome    • Essential (primary) hypertension    • Gout    • Obesity     ht 63 in wt 206 lb.   • Post-menopausal bleeding 10/2013   • RAD (reactive airway disease)    • SOBOE (shortness of breath on exertion) 11/2015       Past Surgical History:   Procedure Laterality Date   • ----------mammogram----------  9/28/16    Benign, [facility]     • Cardiac defibrillator placement  11/2015   • Colonoscopy  2009   • Colonoscopy diagnostic  4/8/2016    10yr recall   • Multiple tooth extractions      no teeth   • Tendon repair Left     tendon lt arm   • Tonsillectomy and adenoidectomy     • Tubal ligation      age 22       Family History:  Family History   Problem Relation Age of Onset   • Systemic Lupus Erythematosus Mother    • Heart disease Mother    • Diabetes Mother    • Cancer Brother    • Heart disease Brother    • Diabetes Father    • Diabetes Maternal Aunt        Social History:  Social History      Tobacco Use   • Smoking status: Never Smoker   • Smokeless tobacco: Never Used   Substance Use Topics   • Alcohol use: Yes     Alcohol/week: 0.0 - 1.0 standard drinks       REVIEW OF SYSTEMS     Review of Systems   All other systems reviewed and are negative.      PHYSICAL EXAM     Physical Exam  Vitals signs reviewed.   Constitutional:       General: She is not in acute distress.     Appearance: She is well-developed. She is not diaphoretic.   HENT:      Head: Normocephalic and atraumatic.   Eyes:      General: No scleral icterus.     Conjunctiva/sclera: Conjunctivae normal.      Pupils: Pupils are equal, round, and reactive to light.   Neck:      Musculoskeletal: Neck supple.      Vascular: No JVD.   Cardiovascular:      Rate and Rhythm: Normal rate and regular rhythm.      Heart sounds: No murmur. No gallop.    Pulmonary:      Effort: Pulmonary effort is normal. No respiratory distress.      Breath sounds: Normal breath sounds. No wheezing or rales.   Abdominal:      General: There is no distension.      Palpations: Abdomen is soft. There is no mass.      Tenderness: There is no abdominal tenderness. There is no guarding.   Skin:     General: Skin is warm.      Coloration: Skin is not pale.      Findings: No rash.   Neurological:      Mental Status: She is alert and oriented to person, place, and time.      Motor: No abnormal muscle tone.   Psychiatric:         Behavior: Behavior normal.       Recentlab tests:   Sodium (mmol/L)   Date Value   09/24/2020 139   12/12/2019 143      Potassium (mmol/L)   Date Value   09/24/2020 4.0   12/12/2019 3.8      BUN (mg/dL)   Date Value   09/24/2020 22 (H)   12/12/2019 19      Creatinine (mg/dL)   Date Value   09/24/2020 1.17 (H)   12/12/2019 1.11 (H)     No results found for: HGBA1C   Glucose (mg/dL)   Date Value   09/24/2020 101 (H)   12/12/2019 113 (H)      Microalbumin/ Creatinine Ratio (mg/g)   Date Value   09/24/2020 3.0     CHOLESTEROL (mg/dL)   Date Value    12/12/2019 140     Cholesterol (mg/dL)   Date Value   09/24/2020 117      HDL (mg/dL)   Date Value   09/24/2020 41 (L)   12/12/2019 50     TRIGLYCERIDE (mg/dL)   Date Value   12/12/2019 112     Triglycerides (mg/dL)   Date Value   09/24/2020 98      CALCULATED LDL (mg/dL)   Date Value   12/12/2019 68     LDL (mg/dL)   Date Value   09/24/2020 56     ALT/SGPT (Units/L)   Date Value   12/12/2019 16     GPT/ALT (Units/L)   Date Value   09/24/2020 14      AST/SGOT (Units/L)   Date Value   12/12/2019 22     GOT/AST (Units/L)   Date Value   09/24/2020 17     HGB (g/dL)   Date Value   09/24/2020 10.9 (L)   12/12/2019 11.3 (L)      PLT (K/mcL)   Date Value   09/24/2020 228   12/12/2019 217   10/13/2013 156   ;  TSH (mcUnits/mL)   Date Value   09/24/2020 2.772   12/12/2019 3.003     VITAMIND, 25 HYDROXY (ng/mL)   Date Value   12/12/2019 18.6 (L)     Vitamin D, 25-Hydroxy (ng/mL)   Date Value   09/24/2020 25.4 (L)       ASSESSMENT/PLAN       L biceps area pain, in lower biceps tendon area as well as upper long tendon. Possibly due to carrying things, laundry up and down stairs. Tylenol, heat prn. She defers PT     Hx atypical non-exertional L CP, none now. She decline dekg, plans to see Cardiology next week.     ...  Systolic CHF, non-ischemic CMP EF 30%. Cardiology Dr Whittington follows and manages her diuretics.   S/p defibrillator placement 11/2015. EP follows.     Hx gen weakness, fatigue. likely multifactorial: CHF, anemia, mild dehydration.improved.   Chronic anemia. mild. S/p hematology consult.   Hx low iron, anemia chronic disease pattern 1/2016.  Improved to NL now.     CRI.     Hypertension, well controlled    Obesity.   Pre-DM.     GERD, prilosec 40 mg daily.      Vit D deficiency, advised OTC vit D 2000 IU 2 caps/day.     BPV, antivert prn.     Lower TS/LS pain. B leg pain, likely lumbar radiculopathy. djd changes on LS xray.  spine clinic consult. S/p pain mngt consult. Tramadol prn. zanaflex prn.      Prevention:   pap smear today with NP.    Colonoscopy 2016; diverticulosis; no polyps.    English

## 2023-01-18 NOTE — PROGRESS NOTE ADULT - SUBJECTIVE AND OBJECTIVE BOX
[facility] NEPHROLOGY SERVICES, [facility]  NEPHROLOGY AND HYPERTENSION  [address]  [address]  [place] [address]  [phone]    MD MAUREEN LUIS, MD MIKAEL JARRELL, MD ASHLEY ALEXANDRE, MD GAIL BLEDSOE, MD LUCRETIA WILKERSON MD          Patient events noted    MEDICATIONS  (STANDING):  amLODIPine   Tablet 10 milliGRAM(s) Oral daily  atenolol  Tablet 25 milliGRAM(s) Oral daily  doxazosin 2 milliGRAM(s) Oral at bedtime  furosemide   Injectable 80 milliGRAM(s) IV Push every 12 hours  hydrALAZINE 50 milliGRAM(s) Oral every 8 hours  simvastatin 20 milliGRAM(s) Oral at bedtime    MEDICATIONS  (PRN):  acetaminophen     Tablet .. 650 milliGRAM(s) Oral every 6 hours PRN Mild Pain (1 - 3), Moderate Pain (4 - 6)  melatonin 3 milliGRAM(s) Oral at bedtime PRN Insomnia      PHYSICAL EXAM:      T(C): 36.7 (01-18-23 @ 19:31), Max: 36.9 (01-18-23 @ 08:26)  HR: 59 (01-18-23 @ 19:31) (59 - 65)  BP: 174/76 (01-18-23 @ 19:31) (119/59 - 187/80)  RR: 12 (01-18-23 @ 19:31) (11 - 18)  SpO2: 97% (01-18-23 @ 19:31) (94% - 99%)  Wt(kg): --  Lungs clear  Heart S1S2  Abd soft NT ND  Extremities:   tr edema                                    6.4    4.83  )-----------( 135      ( 18 Jan 2023 05:40 )             21.2     01-18    143  |  113<H>  |  90<H>  ----------------------------<  133<H>  5.7<H>   |  22  |  11.80<H>    Ca    7.7<L>      18 Jan 2023 05:40  Phos  5.7     01-18  Mg     1.8     01-18    TPro  5.4<L>  /  Alb  2.4<L>  /  TBili  0.2  /  DBili  x   /  AST  16  /  ALT  18  /  AlkPhos  88  01-18      LIVER FUNCTIONS - ( 18 Jan 2023 05:40 )  Alb: 2.4 g/dL / Pro: 5.4 g/dL / ALK PHOS: 88 U/L / ALT: 18 U/L / AST: 16 U/L / GGT: x           Creatinine Trend: 11.80<--, 11.60<--, 11.90<--      Assessment   MIESHA CKD 5; acute on chronic HTN nephrosclerosis; diabetic nephropathy;   Hx of weak paraprotein, unlikely culprit.  Resistant to initiating HD       Plan  PRBC tx  Discussed degree of CKD, high risk for uremic complications;   Judicious diuresis  Paraprotein studies;   Will medically manage and continue ongoing discussion on the indications for HD initiation.      MD Tang Luis MD

## 2023-01-18 NOTE — PROGRESS NOTE ADULT - PROBLEM SELECTOR PLAN 4
Received oral Lokelma and IV cocktails  Also on IV lasix 80mg bid  Telemetry monitoring  Repeat BMP   Nephrology consulted

## 2023-01-19 LAB
ANION GAP SERPL CALC-SCNC: 9 MMOL/L — SIGNIFICANT CHANGE UP (ref 5–17)
BUN SERPL-MCNC: 86 MG/DL — HIGH (ref 7–23)
CALCIUM SERPL-MCNC: 7.6 MG/DL — LOW (ref 8.5–10.1)
CHLORIDE SERPL-SCNC: 115 MMOL/L — HIGH (ref 96–108)
CO2 SERPL-SCNC: 22 MMOL/L — SIGNIFICANT CHANGE UP (ref 22–31)
CREAT SERPL-MCNC: 12.4 MG/DL — HIGH (ref 0.5–1.3)
EGFR: 4 ML/MIN/1.73M2 — LOW
GLUCOSE SERPL-MCNC: 91 MG/DL — SIGNIFICANT CHANGE UP (ref 70–99)
HCT VFR BLD CALC: 22 % — LOW (ref 39–50)
HGB BLD-MCNC: 6.6 G/DL — CRITICAL LOW (ref 13–17)
MCHC RBC-ENTMCNC: 28 PG — SIGNIFICANT CHANGE UP (ref 27–34)
MCHC RBC-ENTMCNC: 30 G/DL — LOW (ref 32–36)
MCV RBC AUTO: 93.2 FL — SIGNIFICANT CHANGE UP (ref 80–100)
NRBC # BLD: 0 /100 WBCS — SIGNIFICANT CHANGE UP (ref 0–0)
PLATELET # BLD AUTO: 121 K/UL — LOW (ref 150–400)
POTASSIUM SERPL-MCNC: 5.5 MMOL/L — HIGH (ref 3.5–5.3)
POTASSIUM SERPL-SCNC: 5.5 MMOL/L — HIGH (ref 3.5–5.3)
RBC # BLD: 2.36 M/UL — LOW (ref 4.2–5.8)
RBC # FLD: 14.7 % — HIGH (ref 10.3–14.5)
SODIUM SERPL-SCNC: 146 MMOL/L — HIGH (ref 135–145)
WBC # BLD: 5.46 K/UL — SIGNIFICANT CHANGE UP (ref 3.8–10.5)
WBC # FLD AUTO: 5.46 K/UL — SIGNIFICANT CHANGE UP (ref 3.8–10.5)

## 2023-01-19 RX ORDER — HEPARIN SODIUM 5000 [USP'U]/ML
5000 INJECTION INTRAVENOUS; SUBCUTANEOUS EVERY 12 HOURS
Refills: 0 | Status: DISCONTINUED | OUTPATIENT
Start: 2023-01-19 | End: 2023-01-26

## 2023-01-19 RX ADMIN — SIMVASTATIN 20 MILLIGRAM(S): 20 TABLET, FILM COATED ORAL at 22:42

## 2023-01-19 RX ADMIN — SODIUM ZIRCONIUM CYCLOSILICATE 10 GRAM(S): 10 POWDER, FOR SUSPENSION ORAL at 14:46

## 2023-01-19 RX ADMIN — Medication 80 MILLIGRAM(S): at 18:09

## 2023-01-19 RX ADMIN — ATENOLOL 25 MILLIGRAM(S): 25 TABLET ORAL at 05:27

## 2023-01-19 RX ADMIN — Medication 50 MILLIGRAM(S): at 22:42

## 2023-01-19 RX ADMIN — AMLODIPINE BESYLATE 10 MILLIGRAM(S): 2.5 TABLET ORAL at 05:26

## 2023-01-19 RX ADMIN — SODIUM ZIRCONIUM CYCLOSILICATE 10 GRAM(S): 10 POWDER, FOR SUSPENSION ORAL at 05:27

## 2023-01-19 RX ADMIN — Medication 2 MILLIGRAM(S): at 22:41

## 2023-01-19 RX ADMIN — Medication 80 MILLIGRAM(S): at 05:27

## 2023-01-19 RX ADMIN — Medication 50 MILLIGRAM(S): at 14:46

## 2023-01-19 RX ADMIN — HEPARIN SODIUM 5000 UNIT(S): 5000 INJECTION INTRAVENOUS; SUBCUTANEOUS at 22:42

## 2023-01-19 RX ADMIN — Medication 50 MILLIGRAM(S): at 05:27

## 2023-01-19 RX ADMIN — Medication 5 MILLIGRAM(S): at 22:42

## 2023-01-19 NOTE — PROGRESS NOTE ADULT - SUBJECTIVE AND OBJECTIVE BOX
HOSPITALIST ATTENDING PROGRESS NOTE    Cc: Follow up for ESRD, anemia    HPI: denies SOB. Tolerating diet. Tired. Leg swelling persists.     EXAM  Vitals: Vital Signs Last 24 Hrs  T(C): 36.7 (19 Jan 2023 15:43), Max: 36.8 (19 Jan 2023 12:30)  T(F): 98.1 (19 Jan 2023 15:43), Max: 98.2 (19 Jan 2023 12:30)  HR: 68 (19 Jan 2023 15:43) (59 - 68)  BP: 147/60 (19 Jan 2023 15:43) (133/52 - 174/76)  BP(mean): --  RR: 16 (19 Jan 2023 15:43) (12 - 20)  SpO2: 100% (19 Jan 2023 15:43) (94% - 100%)  Gen: NAD, comfortable  HEENT: normocephalic, atraumatic, no nasal discharge, trachea midline, anicteric sclerae  CVS: Normal S1S2, RRR, no JVD  RESP: Clear to auscultation bilaterally, no wheezing, no rhonchi, normal respiratory effort  ABD: normal bowel sounds, non-tender, non-distended, no organomegaly  SKIN: no rash seen on visible skin  EXT: 3+ edema B/L lower ext    MEDS  MEDICATIONS  (STANDING):  amLODIPine   Tablet 10 milliGRAM(s) Oral daily  atenolol  Tablet 25 milliGRAM(s) Oral daily  doxazosin 2 milliGRAM(s) Oral at bedtime  furosemide   Injectable 80 milliGRAM(s) IV Push every 12 hours  hydrALAZINE 50 milliGRAM(s) Oral every 8 hours  simvastatin 20 milliGRAM(s) Oral at bedtime    MEDICATIONS  (PRN):  acetaminophen     Tablet .. 650 milliGRAM(s) Oral every 6 hours PRN Mild Pain (1 - 3), Moderate Pain (4 - 6)  melatonin 3 milliGRAM(s) Oral at bedtime PRN Insomnia    LABS/RADIOLOGY                          6.6    5.46  )-----------( 121      ( 19 Jan 2023 06:40 )             22.0    01-19    146<H>  |  115<H>  |  86<H>  ----------------------------<  91  5.5<H>   |  22  |  12.40<H>    Ca    7.6<L>      19 Jan 2023 06:40  Phos  5.7     01-18  Mg     1.8     01-18    TPro  5.4<L>  /  Alb  2.4<L>  /  TBili  0.2  /  DBili  x   /  AST  16  /  ALT  18  /  AlkPhos  88  01-18    CAPILLARY BLOOD GLUCOSE            ASSESMENT/PLAN    67 years old male with h/o HTN, HLD, CKD present to ED with complain of worsening LE edema, SOB and abnormal labs results with elevated Cr. Patient was seen by PCP, just started on lasix 20mg 4 days PTA. Was informed to come to ED on Friday but patient unable to come to ED due to some issue at home. Reported progressive SOB on exertion with orthopnea and PND. No chest pain.   Hypertensive to 214/99, afebrile, sat well at RA. No leukocytosis, Hb 7.5, plt 168, Na 146, K 6, Cr 11.9, proBNP 07236. CXR image reviewed, no focal consolidation Noted central pul venous congestion. LE doppler negative for DVT. Ultrasound kidney with no hydronephrosis. Renal parenchymal disease. Possible chronic bladder outlet obstruction.     Admitted with MIESHA on CKD/progressing to ESRD, accelerated HTN, fluid overload, hyperkalemia       # Acute kidney injury superimposed on CKD, volume overload, hyperkalemia.   # anemia of CKD, no sign of GI bleeding   -High dose IV lasix, matos catheter  -2nd unit of blood to being given today. check labs tomorrow.   -Patient reluctant to undergo HD. Nephrology is discussing with patient  ·Echo: normal LVEF, grade 2 diastolic HF, LVH    #Accelerated hypertension. Hypertensive to 214/99. Received IV hydralazine 10mg and atenolol 25mg oral in ED  Continue amlodipine 10mg, hydralazine 50mg tid, doxazosin 2mg and continue with atenolol 25mg daily  IV lasix   Monitor BP and titrate BP meds.    #Hyperlipidemia,    statin.    DVT Px  Heparin

## 2023-01-19 NOTE — PROGRESS NOTE ADULT - SUBJECTIVE AND OBJECTIVE BOX
NEPHROLOGY PROGRESS NOTE    CHIEF COMPLAINT:  CKD    HPI:  Getting transfused.  No complaints.    EXAM:  T(F): 97.7 (01-19-23 @ 12:45)  HR: 63 (01-19-23 @ 12:45)  BP: 133/52 (01-19-23 @ 12:45)  RR: 20 (01-19-23 @ 12:45)  SpO2: 99% (01-19-23 @ 12:45)    Conversant, in no apparent distress  Normal respiratory effort, lungs clear bilaterally  Heart RRR with no murmur, + peripheral edema         LABS                             6.6    5.46  )-----------( 121      ( 19 Jan 2023 06:40 )             22.0          01-19    146<H>  |  115<H>  |  86<H>  ----------------------------<  91  5.5<H>   |  22  |  12.40<H>    Ca    7.6<L>      19 Jan 2023 06:40  Phos  5.7     01-18  Mg     1.8     01-18    TPro  5.4<L>  /  Alb  2.4<L>  /  TBili  0.2  /  DBili  x   /  AST  16  /  ALT  18  /  AlkPhos  88  01-18    K/L 1.65    Assessment   MIESHA CKD 5; acute on chronic HTN nephrosclerosis; diabetic nephropathy;   Hx of weak paraprotein is not the culprit  Resistant to initiating HD     Plan  Educated again to need to start dialysis, avoid uremic complications, wants "more time"  Will medically manage and continue ongoing discussion on the indications for HD initiation

## 2023-01-20 LAB
ANION GAP SERPL CALC-SCNC: 9 MMOL/L — SIGNIFICANT CHANGE UP (ref 5–17)
BASOPHILS # BLD AUTO: 0.05 K/UL — SIGNIFICANT CHANGE UP (ref 0–0.2)
BASOPHILS NFR BLD AUTO: 0.9 % — SIGNIFICANT CHANGE UP (ref 0–2)
BUN SERPL-MCNC: 89 MG/DL — HIGH (ref 7–23)
CALCIUM SERPL-MCNC: 7.6 MG/DL — LOW (ref 8.5–10.1)
CHLORIDE SERPL-SCNC: 114 MMOL/L — HIGH (ref 96–108)
CO2 SERPL-SCNC: 21 MMOL/L — LOW (ref 22–31)
CREAT SERPL-MCNC: 12.6 MG/DL — HIGH (ref 0.5–1.3)
EGFR: 4 ML/MIN/1.73M2 — LOW
EOSINOPHIL # BLD AUTO: 0.72 K/UL — HIGH (ref 0–0.5)
EOSINOPHIL NFR BLD AUTO: 13.2 % — HIGH (ref 0–6)
GLUCOSE BLDC GLUCOMTR-MCNC: 141 MG/DL — HIGH (ref 70–99)
GLUCOSE BLDC GLUCOMTR-MCNC: 189 MG/DL — HIGH (ref 70–99)
GLUCOSE BLDC GLUCOMTR-MCNC: 225 MG/DL — HIGH (ref 70–99)
GLUCOSE SERPL-MCNC: 114 MG/DL — HIGH (ref 70–99)
HAV IGM SER-ACNC: SIGNIFICANT CHANGE UP
HBV CORE IGM SER-ACNC: SIGNIFICANT CHANGE UP
HBV SURFACE AG SER-ACNC: SIGNIFICANT CHANGE UP
HCT VFR BLD CALC: 26.2 % — LOW (ref 39–50)
HCV AB S/CO SERPL IA: 0.13 S/CO — SIGNIFICANT CHANGE UP (ref 0–0.99)
HCV AB SERPL-IMP: SIGNIFICANT CHANGE UP
HGB BLD-MCNC: 8.4 G/DL — LOW (ref 13–17)
IMM GRANULOCYTES NFR BLD AUTO: 0.4 % — SIGNIFICANT CHANGE UP (ref 0–0.9)
LYMPHOCYTES # BLD AUTO: 0.67 K/UL — LOW (ref 1–3.3)
LYMPHOCYTES # BLD AUTO: 12.3 % — LOW (ref 13–44)
MCHC RBC-ENTMCNC: 28.9 PG — SIGNIFICANT CHANGE UP (ref 27–34)
MCHC RBC-ENTMCNC: 32.1 G/DL — SIGNIFICANT CHANGE UP (ref 32–36)
MCV RBC AUTO: 90 FL — SIGNIFICANT CHANGE UP (ref 80–100)
MONOCYTES # BLD AUTO: 0.42 K/UL — SIGNIFICANT CHANGE UP (ref 0–0.9)
MONOCYTES NFR BLD AUTO: 7.7 % — SIGNIFICANT CHANGE UP (ref 2–14)
NEUTROPHILS # BLD AUTO: 3.56 K/UL — SIGNIFICANT CHANGE UP (ref 1.8–7.4)
NEUTROPHILS NFR BLD AUTO: 65.5 % — SIGNIFICANT CHANGE UP (ref 43–77)
NRBC # BLD: 0 /100 WBCS — SIGNIFICANT CHANGE UP (ref 0–0)
PLATELET # BLD AUTO: 112 K/UL — LOW (ref 150–400)
POTASSIUM SERPL-MCNC: 5 MMOL/L — SIGNIFICANT CHANGE UP (ref 3.5–5.3)
POTASSIUM SERPL-SCNC: 5 MMOL/L — SIGNIFICANT CHANGE UP (ref 3.5–5.3)
RBC # BLD: 2.91 M/UL — LOW (ref 4.2–5.8)
RBC # FLD: 15 % — HIGH (ref 10.3–14.5)
SODIUM SERPL-SCNC: 144 MMOL/L — SIGNIFICANT CHANGE UP (ref 135–145)
WBC # BLD: 5.44 K/UL — SIGNIFICANT CHANGE UP (ref 3.8–10.5)
WBC # FLD AUTO: 5.44 K/UL — SIGNIFICANT CHANGE UP (ref 3.8–10.5)

## 2023-01-20 PROCEDURE — 77001 FLUOROGUIDE FOR VEIN DEVICE: CPT | Mod: 26

## 2023-01-20 PROCEDURE — 76937 US GUIDE VASCULAR ACCESS: CPT | Mod: 26

## 2023-01-20 PROCEDURE — 36558 INSERT TUNNELED CV CATH: CPT

## 2023-01-20 RX ORDER — CHLORHEXIDINE GLUCONATE 213 G/1000ML
1 SOLUTION TOPICAL
Refills: 0 | Status: DISCONTINUED | OUTPATIENT
Start: 2023-01-20 | End: 2023-01-20

## 2023-01-20 RX ORDER — SIMVASTATIN 20 MG/1
1 TABLET, FILM COATED ORAL
Qty: 0 | Refills: 0 | DISCHARGE

## 2023-01-20 RX ORDER — GABAPENTIN 400 MG/1
0 CAPSULE ORAL
Qty: 0 | Refills: 0 | DISCHARGE

## 2023-01-20 RX ORDER — CHLORHEXIDINE GLUCONATE 213 G/1000ML
1 SOLUTION TOPICAL DAILY
Refills: 0 | Status: DISCONTINUED | OUTPATIENT
Start: 2023-01-20 | End: 2023-01-26

## 2023-01-20 RX ORDER — SODIUM CHLORIDE 9 MG/ML
10 INJECTION INTRAMUSCULAR; INTRAVENOUS; SUBCUTANEOUS
Refills: 0 | Status: DISCONTINUED | OUTPATIENT
Start: 2023-01-20 | End: 2023-01-26

## 2023-01-20 RX ORDER — HYDRALAZINE HCL 50 MG
1 TABLET ORAL
Qty: 0 | Refills: 0 | DISCHARGE

## 2023-01-20 RX ADMIN — Medication 2 MILLIGRAM(S): at 21:50

## 2023-01-20 RX ADMIN — AMLODIPINE BESYLATE 10 MILLIGRAM(S): 2.5 TABLET ORAL at 05:23

## 2023-01-20 RX ADMIN — HEPARIN SODIUM 5000 UNIT(S): 5000 INJECTION INTRAVENOUS; SUBCUTANEOUS at 17:16

## 2023-01-20 RX ADMIN — SIMVASTATIN 20 MILLIGRAM(S): 20 TABLET, FILM COATED ORAL at 21:50

## 2023-01-20 RX ADMIN — Medication 50 MILLIGRAM(S): at 05:23

## 2023-01-20 RX ADMIN — Medication 50 MILLIGRAM(S): at 16:14

## 2023-01-20 RX ADMIN — ATENOLOL 25 MILLIGRAM(S): 25 TABLET ORAL at 05:24

## 2023-01-20 RX ADMIN — Medication 80 MILLIGRAM(S): at 17:13

## 2023-01-20 RX ADMIN — Medication 5 MILLIGRAM(S): at 21:50

## 2023-01-20 RX ADMIN — Medication 50 MILLIGRAM(S): at 21:50

## 2023-01-20 RX ADMIN — Medication 80 MILLIGRAM(S): at 05:23

## 2023-01-20 NOTE — PROGRESS NOTE ADULT - SUBJECTIVE AND OBJECTIVE BOX
HOSPITALIST ATTENDING PROGRESS NOTE    Cc: Follow up for renal failure/ESRD, HTN    HPI: denies SOB or acute complaints    EXAM  Vitals: Vital Signs Last 24 Hrs  T(C): 36.9 (20 Jan 2023 15:15), Max: 37.4 (19 Jan 2023 23:52)  T(F): 98.4 (20 Jan 2023 15:15), Max: 99.3 (19 Jan 2023 23:52)  HR: 67 (20 Jan 2023 15:15) (61 - 67)  BP: 167/73 (20 Jan 2023 15:15) (137/50 - 169/78)  BP(mean): --  RR: 18 (20 Jan 2023 15:15) (16 - 18)  SpO2: 100% (20 Jan 2023 15:15) (90% - 100%)  Gen: NAD, comfortable  HEENT: normocephalic, atraumatic, no nasal discharge, trachea midline, anicteric sclerae  CVS: Normal S1S2, RRR, no JVD  RESP: Clear to auscultation bilaterally, no wheezing, no rhonchi, normal respiratory effort  ABD: normal bowel sounds, non-tender, non-distended, no organomegaly  SKIN: no rash seen on visible skin  EXT: edema of both legs present  : matos catheter with clear/yellow urine    MEDS  MEDICATIONS  (STANDING):  amLODIPine   Tablet 10 milliGRAM(s) Oral daily  atenolol  Tablet 25 milliGRAM(s) Oral daily  bisacodyl 5 milliGRAM(s) Oral at bedtime  chlorhexidine 2% Cloths 1 Application(s) Topical daily  doxazosin 2 milliGRAM(s) Oral at bedtime  furosemide   Injectable 80 milliGRAM(s) IV Push every 12 hours  heparin   Injectable 5000 Unit(s) SubCutaneous every 12 hours  hydrALAZINE 50 milliGRAM(s) Oral every 8 hours  simvastatin 20 milliGRAM(s) Oral at bedtime    MEDICATIONS  (PRN):  acetaminophen     Tablet .. 650 milliGRAM(s) Oral every 6 hours PRN Mild Pain (1 - 3), Moderate Pain (4 - 6)  melatonin 3 milliGRAM(s) Oral at bedtime PRN Insomnia  sodium chloride 0.9% lock flush 10 milliLiter(s) IV Push every 1 hour PRN Pre/post blood products, medications, blood draw, and to maintain line patency    LABS/RADIOLOGY                          8.4    5.44  )-----------( 112      ( 20 Jan 2023 07:52 )             26.2    01-20    144  |  114<H>  |  89<H>  ----------------------------<  114<H>  5.0   |  21<L>  |  12.60<H>    Ca    7.6<L>      20 Jan 2023 07:52      CAPILLARY BLOOD GLUCOSE      POCT Blood Glucose.: 189 mg/dL (20 Jan 2023 12:37)        ASSESMENT/PLAN    67 years old male with h/o HTN, HLD, CKD present to ED with complain of worsening LE edema, SOB and abnormal labs results with elevated Cr. Patient was seen by PCP, just started on lasix 20mg 4 days PTA. Was informed to come to ED on Friday but patient unable to come to ED due to some issue at home. Reported progressive SOB on exertion with orthopnea and PND. No chest pain.   Hypertensive to 214/99, afebrile, sat well at RA. No leukocytosis, Hb 7.5, plt 168, Na 146, K 6, Cr 11.9, proBNP 26173. CXR image reviewed, no focal consolidation Noted central pul venous congestion. LE doppler negative for DVT. Ultrasound kidney with no hydronephrosis. Renal parenchymal disease. Possible chronic bladder outlet obstruction.     Admitted with MIESHA on CKD/progressing to ESRD, accelerated HTN, fluid overload, hyperkalemia       # Acute kidney injury superimposed on CKD, volume overload, hyperkalemia.   # anemia of CKD, no sign of GI bleeding   -High dose IV lasix, matos catheter. Start flomax. Getting HD catheter today to initiate HD  -s/p 2 units PRBC, appropriate rise in Hb  ·Echo: normal LVEF, grade 2 diastolic HF, LVH    #Accelerated hypertension. Hypertensive to 214/99. Received IV hydralazine 10mg and atenolol 25mg oral in ED  Continue amlodipine 10mg, hydralazine 50mg tid, doxazosin 2mg and continue with atenolol 25mg daily  IV lasix   Monitor BP and titrate BP meds.    #Hyperlipidemia,    statin.    DVT Px  Heparin

## 2023-01-20 NOTE — PROGRESS NOTE ADULT - SUBJECTIVE AND OBJECTIVE BOX
[facility] NEPHROLOGY SERVICES, [facility]  NEPHROLOGY AND HYPERTENSION  [address]  [address]  [place] [address]  [phone]    MD MAUREEN SANCHEZ, MD MIKAEL JARRELL, MD ASHLEY ALEXANDRE, MD GAIL BLEDSOE, MD LUCRETIA WILKERSON MD          Patient events noted  Alert and awake     MEDICATIONS  (STANDING):  amLODIPine   Tablet 10 milliGRAM(s) Oral daily  atenolol  Tablet 25 milliGRAM(s) Oral daily  bisacodyl 5 milliGRAM(s) Oral at bedtime  doxazosin 2 milliGRAM(s) Oral at bedtime  furosemide   Injectable 80 milliGRAM(s) IV Push every 12 hours  heparin   Injectable 5000 Unit(s) SubCutaneous every 12 hours  hydrALAZINE 50 milliGRAM(s) Oral every 8 hours  simvastatin 20 milliGRAM(s) Oral at bedtime    MEDICATIONS  (PRN):  acetaminophen     Tablet .. 650 milliGRAM(s) Oral every 6 hours PRN Mild Pain (1 - 3), Moderate Pain (4 - 6)  melatonin 3 milliGRAM(s) Oral at bedtime PRN Insomnia      01-19-23 @ 07:01  -  01-20-23 @ 07:00  --------------------------------------------------------  IN: 0 mL / OUT: 450 mL / NET: -450 mL      PHYSICAL EXAM:      T(C): 37 (01-20-23 @ 03:30), Max: 37.4 (01-19-23 @ 23:52)  HR: 62 (01-20-23 @ 03:30) (60 - 68)  BP: 157/73 (01-20-23 @ 03:30) (133/52 - 169/78)  RR: 16 (01-20-23 @ 03:30) (16 - 20)  SpO2: 98% (01-20-23 @ 03:30) (90% - 100%)  Wt(kg): --  Lungs clear  Heart S1S2  Abd soft NT ND  Extremities:   tr edema                                    8.4    5.44  )-----------( 112      ( 20 Jan 2023 07:52 )             26.2     01-20    144  |  114<H>  |  89<H>  ----------------------------<  114<H>  5.0   |  21<L>  |  12.60<H>    Ca    7.6<L>      20 Jan 2023 07:52          Creatinine Trend: 12.60<--, 12.40<--, 11.80<--, 11.60<--, 11.90<--    Assessment   MIESHA CKD 5; acute on chronic HTN nephrosclerosis; diabetic nephropathy;   Hx of weak paraprotein is not the culprit  Agreed to initiate HD.     Plan  Blane catheter today  2 hours HD today; 3 hours tomorrow  Perm cath Monday   Outpatient HD arrangement at [facility] HD center  Discussed with patient and Ofelia his daughter, all questions answered           Tang Cano MD

## 2023-01-20 NOTE — CONSULT NOTE ADULT - SUBJECTIVE AND OBJECTIVE BOX
HPI:  67 years old male with h/o HTN, HLD, CKD present to ED with complain of worsening LE edema, SOB and abnormal labs results with elevated Cr. Patient was seen by PCP, just started on lasix 20mg 4 days ago. Was informed to come to ED on Friday but patient unable to come to ED due to some issue at home. Reported progressive SOB on exertion with orthopnea and PND. No chest pain.   Hypertensive to 214/99, afebrile, sat well at RA. No leukocytosis, Hb 7.5, plt 168, Na 146, K 6, Cr 11.9, proBNP 56225. CXR image reviewed, no focal consolidation Noted central pul venous congestion. LE doppler negative for DVT. Ultrasound kidney with no hydronephrosis. Renal parenchymal disease. Possible chronic bladder outlet obstruction. (17 Jan 2023 16:26)  With ESRD requiring placement of tunneled HD catheter.     ROS  General:  No wt loss, fevers, chills, night sweats  CV:  No pain, palpitations,   Resp:  No dyspnea, cough, tachypnea, wheezing  GI:  No pain, nausea, vomiting, diarrhea, constipation  :  No pain, bleeding, incontinence, nocturia  Heme:  No petechiae, ecchymosis, easy bruisability    PMHx  Diabetes    HTN (hypertension)    Renal insufficiency      Allergies  No Known Allergies    Medications  heparin   Injectable, Routine    PHYSICAL EXAM:  T(C): 36.9, Max: 37.4 (01-19-23 @ 23:52)  HR: 61  BP: 160/78  RR: 17  SpO2: 96%    General: NAD, NC/AT  Neuro: A&O x 3  Respiratory: Non-labored breathing  Abdomen:  Soft, non-tender, non-distended, no peritoneal signs  Extremities: 3+ edema of BLE    LABS:  WBC 5.44 / HgB 8.4 / Hct 26.2 / Plt 112  Na 144 / K 5.0 / CO2 21 / Cl 114 / BUN 89 / Cr 12.60 / Glucose 114  ALT -- / -- / Alk Phos -- / TBili --  PTT -- / PT -- / INR --    A/P:   67 years old male with h/o HTN, HLD, CKD present to ED with complain of worsening LE edema, SOB and abnormal labs results with elevated Cr.   With ESRD requiring HD.    - Case reviewed and approved for tunneled HD catheter  - d/w Dr. Halaibeh

## 2023-01-21 LAB
% ALBUMIN: 48.5 % — SIGNIFICANT CHANGE UP
% ALPHA 1: 5.5 % — SIGNIFICANT CHANGE UP
% ALPHA 2: 11.9 % — SIGNIFICANT CHANGE UP
% BETA: 11.2 % — SIGNIFICANT CHANGE UP
% GAMMA: 22.9 % — SIGNIFICANT CHANGE UP
% M SPIKE: SIGNIFICANT CHANGE UP
ALBUMIN SERPL ELPH-MCNC: 2.6 G/DL — LOW (ref 3.6–5.5)
ALBUMIN/GLOB SERPL ELPH: 0.9 RATIO — SIGNIFICANT CHANGE UP
ALPHA1 GLOB SERPL ELPH-MCNC: 0.3 G/DL — SIGNIFICANT CHANGE UP (ref 0.1–0.4)
ALPHA2 GLOB SERPL ELPH-MCNC: 0.6 G/DL — SIGNIFICANT CHANGE UP (ref 0.5–1)
ANION GAP SERPL CALC-SCNC: 8 MMOL/L — SIGNIFICANT CHANGE UP (ref 5–17)
B-GLOBULIN SERPL ELPH-MCNC: 0.6 G/DL — SIGNIFICANT CHANGE UP (ref 0.5–1)
BASOPHILS # BLD AUTO: 0.03 K/UL — SIGNIFICANT CHANGE UP (ref 0–0.2)
BASOPHILS NFR BLD AUTO: 0.6 % — SIGNIFICANT CHANGE UP (ref 0–2)
BUN SERPL-MCNC: 75 MG/DL — HIGH (ref 7–23)
CALCIUM SERPL-MCNC: 7.6 MG/DL — LOW (ref 8.5–10.1)
CHLORIDE SERPL-SCNC: 109 MMOL/L — HIGH (ref 96–108)
CO2 SERPL-SCNC: 25 MMOL/L — SIGNIFICANT CHANGE UP (ref 22–31)
CREAT SERPL-MCNC: 10.7 MG/DL — HIGH (ref 0.5–1.3)
EGFR: 5 ML/MIN/1.73M2 — LOW
EOSINOPHIL # BLD AUTO: 0.62 K/UL — HIGH (ref 0–0.5)
EOSINOPHIL NFR BLD AUTO: 13.2 % — HIGH (ref 0–6)
GAMMA GLOBULIN: 1.2 G/DL — SIGNIFICANT CHANGE UP (ref 0.6–1.6)
GLUCOSE BLDC GLUCOMTR-MCNC: 126 MG/DL — HIGH (ref 70–99)
GLUCOSE BLDC GLUCOMTR-MCNC: 174 MG/DL — HIGH (ref 70–99)
GLUCOSE BLDC GLUCOMTR-MCNC: 92 MG/DL — SIGNIFICANT CHANGE UP (ref 70–99)
GLUCOSE SERPL-MCNC: 85 MG/DL — SIGNIFICANT CHANGE UP (ref 70–99)
HCT VFR BLD CALC: 26.5 % — LOW (ref 39–50)
HGB BLD-MCNC: 8.3 G/DL — LOW (ref 13–17)
IMM GRANULOCYTES NFR BLD AUTO: 0.2 % — SIGNIFICANT CHANGE UP (ref 0–0.9)
INTERPRETATION SERPL IFE-IMP: SIGNIFICANT CHANGE UP
LYMPHOCYTES # BLD AUTO: 0.6 K/UL — LOW (ref 1–3.3)
LYMPHOCYTES # BLD AUTO: 12.8 % — LOW (ref 13–44)
M-SPIKE: SIGNIFICANT CHANGE UP (ref 0–0)
MCHC RBC-ENTMCNC: 28.5 PG — SIGNIFICANT CHANGE UP (ref 27–34)
MCHC RBC-ENTMCNC: 31.3 G/DL — LOW (ref 32–36)
MCV RBC AUTO: 91.1 FL — SIGNIFICANT CHANGE UP (ref 80–100)
MONOCYTES # BLD AUTO: 0.34 K/UL — SIGNIFICANT CHANGE UP (ref 0–0.9)
MONOCYTES NFR BLD AUTO: 7.2 % — SIGNIFICANT CHANGE UP (ref 2–14)
NEUTROPHILS # BLD AUTO: 3.09 K/UL — SIGNIFICANT CHANGE UP (ref 1.8–7.4)
NEUTROPHILS NFR BLD AUTO: 66 % — SIGNIFICANT CHANGE UP (ref 43–77)
NRBC # BLD: 0 /100 WBCS — SIGNIFICANT CHANGE UP (ref 0–0)
PLATELET # BLD AUTO: 111 K/UL — LOW (ref 150–400)
POTASSIUM SERPL-MCNC: 4.4 MMOL/L — SIGNIFICANT CHANGE UP (ref 3.5–5.3)
POTASSIUM SERPL-SCNC: 4.4 MMOL/L — SIGNIFICANT CHANGE UP (ref 3.5–5.3)
PROT PATTERN SERPL ELPH-IMP: SIGNIFICANT CHANGE UP
RBC # BLD: 2.91 M/UL — LOW (ref 4.2–5.8)
RBC # FLD: 14.5 % — SIGNIFICANT CHANGE UP (ref 10.3–14.5)
SODIUM SERPL-SCNC: 142 MMOL/L — SIGNIFICANT CHANGE UP (ref 135–145)
WBC # BLD: 4.69 K/UL — SIGNIFICANT CHANGE UP (ref 3.8–10.5)
WBC # FLD AUTO: 4.69 K/UL — SIGNIFICANT CHANGE UP (ref 3.8–10.5)

## 2023-01-21 RX ADMIN — SIMVASTATIN 20 MILLIGRAM(S): 20 TABLET, FILM COATED ORAL at 21:38

## 2023-01-21 RX ADMIN — Medication 650 MILLIGRAM(S): at 12:00

## 2023-01-21 RX ADMIN — CHLORHEXIDINE GLUCONATE 1 APPLICATION(S): 213 SOLUTION TOPICAL at 11:48

## 2023-01-21 RX ADMIN — Medication 50 MILLIGRAM(S): at 06:03

## 2023-01-21 RX ADMIN — ATENOLOL 25 MILLIGRAM(S): 25 TABLET ORAL at 06:03

## 2023-01-21 RX ADMIN — Medication 80 MILLIGRAM(S): at 06:03

## 2023-01-21 RX ADMIN — Medication 650 MILLIGRAM(S): at 13:10

## 2023-01-21 RX ADMIN — HEPARIN SODIUM 5000 UNIT(S): 5000 INJECTION INTRAVENOUS; SUBCUTANEOUS at 06:03

## 2023-01-21 RX ADMIN — Medication 50 MILLIGRAM(S): at 21:38

## 2023-01-21 RX ADMIN — AMLODIPINE BESYLATE 10 MILLIGRAM(S): 2.5 TABLET ORAL at 06:03

## 2023-01-21 RX ADMIN — Medication 2 MILLIGRAM(S): at 21:38

## 2023-01-21 RX ADMIN — Medication 50 MILLIGRAM(S): at 16:19

## 2023-01-21 NOTE — PROGRESS NOTE ADULT - SUBJECTIVE AND OBJECTIVE BOX
HOSPITALIST ATTENDING PROGRESS NOTE    Cc: Follow up for renal failure, volume overload    HPI: denies sob or acute complaints    EXAM  Vitals: Vital Signs Last 24 Hrs  T(C): 36.7 (21 Jan 2023 11:45), Max: 37 (20 Jan 2023 20:05)  T(F): 98.1 (21 Jan 2023 11:45), Max: 98.6 (20 Jan 2023 20:05)  HR: 64 (21 Jan 2023 11:45) (60 - 69)  BP: 166/76 (21 Jan 2023 11:45) (144/75 - 186/77)  BP(mean): 100 (21 Jan 2023 00:19) (100 - 100)  RR: 18 (21 Jan 2023 11:45) (18 - 20)  SpO2: 95% (21 Jan 2023 11:45) (95% - 100%)  Gen: NAD, comfortable  HEENT: normocephalic, atraumatic, no nasal discharge, trachea midline, anicteric sclerae  CVS: Normal S1S2, RRR, no JVD  RESP: Clear to auscultation bilaterally, no wheezing, no rhonchi, normal respiratory effort  ABD: normal bowel sounds, non-tender, non-distended, no organomegaly  SKIN: no rash seen on visible skin  EXT: 2+ edema of legs    MEDS  MEDICATIONS  (STANDING):  amLODIPine   Tablet 10 milliGRAM(s) Oral daily  atenolol  Tablet 25 milliGRAM(s) Oral daily  bisacodyl 5 milliGRAM(s) Oral at bedtime  chlorhexidine 2% Cloths 1 Application(s) Topical daily  doxazosin 2 milliGRAM(s) Oral at bedtime  furosemide   Injectable 80 milliGRAM(s) IV Push every 12 hours  heparin   Injectable 5000 Unit(s) SubCutaneous every 12 hours  hydrALAZINE 50 milliGRAM(s) Oral every 8 hours  simvastatin 20 milliGRAM(s) Oral at bedtime    MEDICATIONS  (PRN):  acetaminophen     Tablet .. 650 milliGRAM(s) Oral every 6 hours PRN Mild Pain (1 - 3), Moderate Pain (4 - 6)  melatonin 3 milliGRAM(s) Oral at bedtime PRN Insomnia  sodium chloride 0.9% lock flush 10 milliLiter(s) IV Push every 1 hour PRN Pre/post blood products, medications, blood draw, and to maintain line patency    LABS/RADIOLOGY                          8.3    4.69  )-----------( 111      ( 21 Jan 2023 07:15 )             26.5    01-21    142  |  109<H>  |  75<H>  ----------------------------<  85  4.4   |  25  |  10.70<H>    Ca    7.6<L>      21 Jan 2023 07:15      CAPILLARY BLOOD GLUCOSE      POCT Blood Glucose.: 126 mg/dL (21 Jan 2023 12:03)  POCT Blood Glucose.: 92 mg/dL (21 Jan 2023 07:53)  POCT Blood Glucose.: 225 mg/dL (20 Jan 2023 21:14)  POCT Blood Glucose.: 141 mg/dL (20 Jan 2023 16:51)        ASSESMENT/PLAN    67 years old male with h/o HTN, HLD, CKD present to ED with complain of worsening LE edema, SOB and abnormal labs results with elevated Cr. Patient was seen by PCP, just started on lasix 20mg 4 days PTA. Was informed to come to ED on Friday but patient unable to come to ED due to some issue at home. Reported progressive SOB on exertion with orthopnea and PND. No chest pain.   Hypertensive to 214/99, afebrile, sat well at RA. No leukocytosis, Hb 7.5, plt 168, Na 146, K 6, Cr 11.9, proBNP 53130. CXR image reviewed, no focal consolidation Noted central pul venous congestion. LE doppler negative for DVT. Ultrasound kidney with no hydronephrosis. Renal parenchymal disease. Possible chronic bladder outlet obstruction.     Admitted with MIESHA on CKD/progressing to ESRD, accelerated HTN, fluid overload, hyperkalemia       # Acute kidney injury superimposed on CKD, now ESRD, volume overload, hyperkalemia.   # anemia of CKD, no sign of GI bleeding   -dc/ lasix now that patient is on HD. cont matos catheter, flomax. 2nd session of HD today as per nephrology  -s/p 2 units PRBC, appropriate rise in Hb  ·Echo: normal LVEF, grade 2 diastolic HF, LVH    #Accelerated hypertension. Hypertensive to 214/99. Received IV hydralazine 10mg and atenolol 25mg oral in ED  Continue amlodipine 10mg, hydralazine 50mg tid, doxazosin 2mg and continue with atenolol 25mg daily  Monitor BP and titrate BP meds. Should improve/lower with HD    #Hyperlipidemia,    statin.    DVT Px  Heparin

## 2023-01-21 NOTE — PROGRESS NOTE ADULT - SUBJECTIVE AND OBJECTIVE BOX
[facility] NEPHROLOGY SERVICES, [facility]  NEPHROLOGY AND HYPERTENSION  [address]  [address]  [place] [address]  [phone]    MD MAUREEN SANCHEZ, MD USAMA FORTUNE, MD MIKAEL OBRIEN, MD ASHLEY ALEXANDRE, MD GAIL BLEDSOE, MD LUCRETIA WILKERSON MD          Patient events noted    MEDICATIONS  (STANDING):  amLODIPine   Tablet 10 milliGRAM(s) Oral daily  atenolol  Tablet 25 milliGRAM(s) Oral daily  bisacodyl 5 milliGRAM(s) Oral at bedtime  chlorhexidine 2% Cloths 1 Application(s) Topical daily  doxazosin 2 milliGRAM(s) Oral at bedtime  heparin   Injectable 5000 Unit(s) SubCutaneous every 12 hours  hydrALAZINE 50 milliGRAM(s) Oral every 8 hours  simvastatin 20 milliGRAM(s) Oral at bedtime    MEDICATIONS  (PRN):  acetaminophen     Tablet .. 650 milliGRAM(s) Oral every 6 hours PRN Mild Pain (1 - 3), Moderate Pain (4 - 6)  melatonin 3 milliGRAM(s) Oral at bedtime PRN Insomnia  sodium chloride 0.9% lock flush 10 milliLiter(s) IV Push every 1 hour PRN Pre/post blood products, medications, blood draw, and to maintain line patency      01-20-23 @ 07:01  -  01-21-23 @ 07:00  --------------------------------------------------------  IN: 350 mL / OUT: 2900 mL / NET: -2550 mL    01-21-23 @ 07:01  -  01-21-23 @ 21:29  --------------------------------------------------------  IN: 0 mL / OUT: 2800 mL / NET: -2800 mL      PHYSICAL EXAM:      T(C): 37.2 (01-21-23 @ 18:42), Max: 37.2 (01-21-23 @ 18:42)  HR: 62 (01-21-23 @ 18:42) (61 - 69)  BP: 162/67 (01-21-23 @ 18:42) (144/75 - 179/63)  RR: 18 (01-21-23 @ 18:42) (16 - 19)  SpO2: 99% (01-21-23 @ 18:42) (95% - 99%)  Wt(kg): --  Lungs clear  Heart S1S2  Abd soft NT ND  Extremities:   tr edema                                    8.3    4.69  )-----------( 111      ( 21 Jan 2023 07:15 )             26.5     01-21    142  |  109<H>  |  75<H>  ----------------------------<  85  4.4   |  25  |  10.70<H>    Ca    7.6<L>      21 Jan 2023 07:15          Creatinine Trend: 10.70<--, 12.60<--, 12.40<--, 11.80<--, 11.60<--, 11.90<--      Assessment   MIESHA CKD 5; acute on chronic HTN nephrosclerosis; diabetic nephropathy;   Hx of weak paraprotein is not the culprit  Agreed to initiate HD.   Post perm cath placement     Plan  HD for today 3 hrs  Outpatient HD arrangement at [facility]  Discussed with patient and Ofelia his daughter, all questions answered       Tang Cano MD

## 2023-01-22 LAB
ANION GAP SERPL CALC-SCNC: 7 MMOL/L — SIGNIFICANT CHANGE UP (ref 5–17)
BASOPHILS # BLD AUTO: 0.04 K/UL — SIGNIFICANT CHANGE UP (ref 0–0.2)
BASOPHILS NFR BLD AUTO: 0.8 % — SIGNIFICANT CHANGE UP (ref 0–2)
BUN SERPL-MCNC: 49 MG/DL — HIGH (ref 7–23)
CALCIUM SERPL-MCNC: 7.8 MG/DL — LOW (ref 8.5–10.1)
CHLORIDE SERPL-SCNC: 106 MMOL/L — SIGNIFICANT CHANGE UP (ref 96–108)
CO2 SERPL-SCNC: 26 MMOL/L — SIGNIFICANT CHANGE UP (ref 22–31)
CREAT SERPL-MCNC: 8.41 MG/DL — HIGH (ref 0.5–1.3)
EGFR: 6 ML/MIN/1.73M2 — LOW
EOSINOPHIL # BLD AUTO: 0.68 K/UL — HIGH (ref 0–0.5)
EOSINOPHIL NFR BLD AUTO: 13.8 % — HIGH (ref 0–6)
GLUCOSE BLDC GLUCOMTR-MCNC: 127 MG/DL — HIGH (ref 70–99)
GLUCOSE BLDC GLUCOMTR-MCNC: 157 MG/DL — HIGH (ref 70–99)
GLUCOSE BLDC GLUCOMTR-MCNC: 225 MG/DL — HIGH (ref 70–99)
GLUCOSE BLDC GLUCOMTR-MCNC: 97 MG/DL — SIGNIFICANT CHANGE UP (ref 70–99)
GLUCOSE SERPL-MCNC: 94 MG/DL — SIGNIFICANT CHANGE UP (ref 70–99)
HCT VFR BLD CALC: 27.2 % — LOW (ref 39–50)
HGB BLD-MCNC: 8.7 G/DL — LOW (ref 13–17)
IMM GRANULOCYTES NFR BLD AUTO: 0.4 % — SIGNIFICANT CHANGE UP (ref 0–0.9)
LYMPHOCYTES # BLD AUTO: 0.58 K/UL — LOW (ref 1–3.3)
LYMPHOCYTES # BLD AUTO: 11.8 % — LOW (ref 13–44)
MCHC RBC-ENTMCNC: 28.6 PG — SIGNIFICANT CHANGE UP (ref 27–34)
MCHC RBC-ENTMCNC: 32 G/DL — SIGNIFICANT CHANGE UP (ref 32–36)
MCV RBC AUTO: 89.5 FL — SIGNIFICANT CHANGE UP (ref 80–100)
MONOCYTES # BLD AUTO: 0.45 K/UL — SIGNIFICANT CHANGE UP (ref 0–0.9)
MONOCYTES NFR BLD AUTO: 9.1 % — SIGNIFICANT CHANGE UP (ref 2–14)
NEUTROPHILS # BLD AUTO: 3.15 K/UL — SIGNIFICANT CHANGE UP (ref 1.8–7.4)
NEUTROPHILS NFR BLD AUTO: 64.1 % — SIGNIFICANT CHANGE UP (ref 43–77)
NRBC # BLD: 0 /100 WBCS — SIGNIFICANT CHANGE UP (ref 0–0)
PLATELET # BLD AUTO: 94 K/UL — LOW (ref 150–400)
POTASSIUM SERPL-MCNC: 3.9 MMOL/L — SIGNIFICANT CHANGE UP (ref 3.5–5.3)
POTASSIUM SERPL-SCNC: 3.9 MMOL/L — SIGNIFICANT CHANGE UP (ref 3.5–5.3)
RBC # BLD: 3.04 M/UL — LOW (ref 4.2–5.8)
RBC # FLD: 14.1 % — SIGNIFICANT CHANGE UP (ref 10.3–14.5)
SODIUM SERPL-SCNC: 139 MMOL/L — SIGNIFICANT CHANGE UP (ref 135–145)
WBC # BLD: 4.92 K/UL — SIGNIFICANT CHANGE UP (ref 3.8–10.5)
WBC # FLD AUTO: 4.92 K/UL — SIGNIFICANT CHANGE UP (ref 3.8–10.5)

## 2023-01-22 RX ADMIN — AMLODIPINE BESYLATE 10 MILLIGRAM(S): 2.5 TABLET ORAL at 05:46

## 2023-01-22 RX ADMIN — SIMVASTATIN 20 MILLIGRAM(S): 20 TABLET, FILM COATED ORAL at 21:26

## 2023-01-22 RX ADMIN — Medication 2 MILLIGRAM(S): at 21:26

## 2023-01-22 RX ADMIN — Medication 50 MILLIGRAM(S): at 14:44

## 2023-01-22 RX ADMIN — Medication 50 MILLIGRAM(S): at 21:26

## 2023-01-22 RX ADMIN — HEPARIN SODIUM 5000 UNIT(S): 5000 INJECTION INTRAVENOUS; SUBCUTANEOUS at 17:07

## 2023-01-22 RX ADMIN — ATENOLOL 25 MILLIGRAM(S): 25 TABLET ORAL at 05:45

## 2023-01-22 RX ADMIN — Medication 650 MILLIGRAM(S): at 05:59

## 2023-01-22 RX ADMIN — HEPARIN SODIUM 5000 UNIT(S): 5000 INJECTION INTRAVENOUS; SUBCUTANEOUS at 05:45

## 2023-01-22 RX ADMIN — Medication 5 MILLIGRAM(S): at 21:26

## 2023-01-22 RX ADMIN — CHLORHEXIDINE GLUCONATE 1 APPLICATION(S): 213 SOLUTION TOPICAL at 11:28

## 2023-01-22 RX ADMIN — Medication 50 MILLIGRAM(S): at 05:45

## 2023-01-22 NOTE — PHYSICAL THERAPY INITIAL EVALUATION ADULT - PERTINENT HX OF CURRENT PROBLEM, REHAB EVAL
As per Ed provider notes A 67 years old male here c/o swelling bilateral legs pt was seen by pmd Dr. Bowens and started Furosamide 20 mg qd four days ago. Pt also c/o exertional sob unable to walk one full block. and sts pt has to sit up to at night. Pt denies recent hx of long traveling, hx of blood clot, headache, dizziness, blurred visions, light sensitivities, focal/distal weakness or numbness, neck/back/hips/calfs pain, cough chest pain, nausea, vomiting, fever, chills, abd pain, dysuria, or irregular bowel movements. Pt sts he has hx renal disease and hypertension but did not take his meds this morning.

## 2023-01-22 NOTE — PROGRESS NOTE ADULT - SUBJECTIVE AND OBJECTIVE BOX
HOSPITALIST ATTENDING PROGRESS NOTE    Cc: Follow up for miesha/esrd, hypekalemia, volume oveload    HPI: pt seen. leg swelling slowly going down. no sob    EXAM  Vitals: Vital Signs Last 24 Hrs  T(C): 36.9 (22 Jan 2023 11:16), Max: 37.2 (21 Jan 2023 18:42)  T(F): 98.4 (22 Jan 2023 11:16), Max: 98.9 (21 Jan 2023 18:42)  HR: 62 (22 Jan 2023 11:30) (62 - 71)  BP: 152/80 (22 Jan 2023 11:30) (152/80 - 179/63)  BP(mean): --  RR: 18 (22 Jan 2023 11:16) (16 - 18)  SpO2: 97% (22 Jan 2023 11:30) (95% - 99%)  Gen: NAD, comfortable  HEENT: normocephalic, atraumatic, no nasal discharge, trachea midline, anicteric sclerae  CVS: Normal S1S2, RRR, no JVD  RESP: Clear to auscultation bilaterally, no wheezing, no rhonchi, normal respiratory effort  ABD: normal bowel sounds, non-tender, non-distended, no organomegaly  SKIN: no rash seen on visible skin  EXT: B/L leg edema prsent    MEDS  MEDICATIONS  (STANDING):  amLODIPine   Tablet 10 milliGRAM(s) Oral daily  atenolol  Tablet 25 milliGRAM(s) Oral daily  bisacodyl 5 milliGRAM(s) Oral at bedtime  chlorhexidine 2% Cloths 1 Application(s) Topical daily  doxazosin 2 milliGRAM(s) Oral at bedtime  heparin   Injectable 5000 Unit(s) SubCutaneous every 12 hours  hydrALAZINE 50 milliGRAM(s) Oral every 8 hours  simvastatin 20 milliGRAM(s) Oral at bedtime    MEDICATIONS  (PRN):  acetaminophen     Tablet .. 650 milliGRAM(s) Oral every 6 hours PRN Mild Pain (1 - 3), Moderate Pain (4 - 6)  melatonin 3 milliGRAM(s) Oral at bedtime PRN Insomnia  sodium chloride 0.9% lock flush 10 milliLiter(s) IV Push every 1 hour PRN Pre/post blood products, medications, blood draw, and to maintain line patency    LABS/RADIOLOGY                          8.7    4.92  )-----------( 94       ( 22 Jan 2023 07:03 )             27.2    01-22    139  |  106  |  49<H>  ----------------------------<  94  3.9   |  26  |  8.41<H>    Ca    7.8<L>      22 Jan 2023 07:03      CAPILLARY BLOOD GLUCOSE      POCT Blood Glucose.: 225 mg/dL (22 Jan 2023 11:25)  POCT Blood Glucose.: 97 mg/dL (22 Jan 2023 08:08)  POCT Blood Glucose.: 174 mg/dL (21 Jan 2023 21:07)        ASSESMENT/PLAN    67 years old male with h/o HTN, HLD, CKD present to ED with complain of worsening LE edema, SOB and abnormal labs results with elevated Cr. Patient was seen by PCP, just started on lasix 20mg 4 days PTA. Was informed to come to ED on Friday but patient unable to come to ED due to some issue at home. Reported progressive SOB on exertion with orthopnea and PND. No chest pain.   Hypertensive to 214/99, afebrile, sat well at RA. No leukocytosis, Hb 7.5, plt 168, Na 146, K 6, Cr 11.9, proBNP 26590. CXR image reviewed, no focal consolidation Noted central pul venous congestion. LE doppler negative for DVT. Ultrasound kidney with no hydronephrosis. Renal parenchymal disease. Possible chronic bladder outlet obstruction.     Admitted with MIESHA on CKD/progressing to ESRD, accelerated HTN, fluid overload, hyperkalemia       # Acute kidney injury superimposed on CKD, now ESRD, volume overload, hyperkalemia. Patient on HD now, 2nd session yesterday  # anemia of CKD, no sign of GI bleeding   -cont matos catheter, doxazosin. Voiding trial in 24-48hrs once patient more mobile.   -HD as per nephro. Permacath tomorrow  -s/p 2 units PRBC, appropriate rise in Hb. Hb stable. Procrit with HD as per nephrology   ·Echo: normal LVEF, grade 2 diastolic HF, LVH    #Accelerated hypertension. Hypertensive to 214/99. Received IV hydralazine 10mg and atenolol 25mg oral in ED  Continue amlodipine 10mg, hydralazine 50mg tid, doxazosin 2mg and continue with atenolol 25mg daily  Monitor BP and titrate BP meds. Should improve/lower further with HD    #Hyperlipidemia,    statin.     Dispo: pending     DVT Px  Heparin     Dispo: pending permacath placement and outpatient HD slot availability

## 2023-01-22 NOTE — PHYSICAL THERAPY INITIAL EVALUATION ADULT - GAIT TRAINING, PT EVAL
Patient will ambulate 500 feet with/without straight cane independently for community ambulation in 2-3 weeks

## 2023-01-22 NOTE — PROGRESS NOTE ADULT - SUBJECTIVE AND OBJECTIVE BOX
[facility] NEPHROLOGY SERVICES, [facility]  NEPHROLOGY AND HYPERTENSION  [address]  [address]  [place] [address]  [phone]    MD MAUREEN SANCHEZ MD ANDREY GONCHARUK, MD MADHU KORRAPATI, MD YELENA ROSENBERG, MD BINNY KOSHY, MD CHRISTOPHER CAPUTO, MD LUCRETIA WILKERSON MD          Patient events noted    MEDICATIONS  (STANDING):  amLODIPine   Tablet 10 milliGRAM(s) Oral daily  atenolol  Tablet 25 milliGRAM(s) Oral daily  bisacodyl 5 milliGRAM(s) Oral at bedtime  chlorhexidine 2% Cloths 1 Application(s) Topical daily  doxazosin 2 milliGRAM(s) Oral at bedtime  heparin   Injectable 5000 Unit(s) SubCutaneous every 12 hours  hydrALAZINE 50 milliGRAM(s) Oral every 8 hours  simvastatin 20 milliGRAM(s) Oral at bedtime    MEDICATIONS  (PRN):  acetaminophen     Tablet .. 650 milliGRAM(s) Oral every 6 hours PRN Mild Pain (1 - 3), Moderate Pain (4 - 6)  melatonin 3 milliGRAM(s) Oral at bedtime PRN Insomnia  sodium chloride 0.9% lock flush 10 milliLiter(s) IV Push every 1 hour PRN Pre/post blood products, medications, blood draw, and to maintain line patency      01-21-23 @ 07:01  -  01-22-23 @ 07:00  --------------------------------------------------------  IN: 0 mL / OUT: 2800 mL / NET: -2800 mL      PHYSICAL EXAM:      T(C): 36.9 (01-22-23 @ 23:32), Max: 37.2 (01-22-23 @ 16:45)  HR: 65 (01-22-23 @ 23:32) (61 - 69)  BP: 178/78 (01-22-23 @ 23:32) (148/71 - 178/78)  RR: 16 (01-22-23 @ 23:32) (16 - 18)  SpO2: 95% (01-22-23 @ 23:32) (95% - 97%)  Wt(kg): --  Lungs clear  Heart S1S2  Abd soft NT ND  Extremities:   2-3 edema                                    8.7    4.92  )-----------( 94       ( 22 Jan 2023 07:03 )             27.2     01-22    139  |  106  |  49<H>  ----------------------------<  94  3.9   |  26  |  8.41<H>    Ca    7.8<L>      22 Jan 2023 07:03          Creatinine Trend: 8.41<--, 10.70<--, 12.60<--, 12.40<--, 11.80<--, 11.60<--      Assessment   New ESRD;     Plan:  HD for tomorrow   Discharge planning     Tang Cano MD

## 2023-01-22 NOTE — PHYSICAL THERAPY INITIAL EVALUATION ADULT - ADDITIONAL COMMENTS
As per pt lives in a 1st level of the house , with no entry steps, reports to previously independent in all mobility without any assistive devices, denies any recent falls within 6 months

## 2023-01-23 LAB
ANION GAP SERPL CALC-SCNC: 8 MMOL/L — SIGNIFICANT CHANGE UP (ref 5–17)
BASOPHILS # BLD AUTO: 0.02 K/UL — SIGNIFICANT CHANGE UP (ref 0–0.2)
BASOPHILS NFR BLD AUTO: 0.4 % — SIGNIFICANT CHANGE UP (ref 0–2)
BUN SERPL-MCNC: 57 MG/DL — HIGH (ref 7–23)
CALCIUM SERPL-MCNC: 7.6 MG/DL — LOW (ref 8.5–10.1)
CHLORIDE SERPL-SCNC: 110 MMOL/L — HIGH (ref 96–108)
CO2 SERPL-SCNC: 26 MMOL/L — SIGNIFICANT CHANGE UP (ref 22–31)
CREAT SERPL-MCNC: 9.49 MG/DL — HIGH (ref 0.5–1.3)
EGFR: 6 ML/MIN/1.73M2 — LOW
EOSINOPHIL # BLD AUTO: 0.65 K/UL — HIGH (ref 0–0.5)
EOSINOPHIL NFR BLD AUTO: 13.4 % — HIGH (ref 0–6)
GLUCOSE BLDC GLUCOMTR-MCNC: 205 MG/DL — HIGH (ref 70–99)
GLUCOSE BLDC GLUCOMTR-MCNC: 94 MG/DL — SIGNIFICANT CHANGE UP (ref 70–99)
GLUCOSE SERPL-MCNC: 81 MG/DL — SIGNIFICANT CHANGE UP (ref 70–99)
HCT VFR BLD CALC: 26.5 % — LOW (ref 39–50)
HGB BLD-MCNC: 8.4 G/DL — LOW (ref 13–17)
IMM GRANULOCYTES NFR BLD AUTO: 0 % — SIGNIFICANT CHANGE UP (ref 0–0.9)
LYMPHOCYTES # BLD AUTO: 0.74 K/UL — LOW (ref 1–3.3)
LYMPHOCYTES # BLD AUTO: 15.3 % — SIGNIFICANT CHANGE UP (ref 13–44)
M PROTEIN 24H UR ELPH-MRATE: SIGNIFICANT CHANGE UP
MCHC RBC-ENTMCNC: 28.6 PG — SIGNIFICANT CHANGE UP (ref 27–34)
MCHC RBC-ENTMCNC: 31.7 G/DL — LOW (ref 32–36)
MCV RBC AUTO: 90.1 FL — SIGNIFICANT CHANGE UP (ref 80–100)
MONOCYTES # BLD AUTO: 0.44 K/UL — SIGNIFICANT CHANGE UP (ref 0–0.9)
MONOCYTES NFR BLD AUTO: 9.1 % — SIGNIFICANT CHANGE UP (ref 2–14)
NEUTROPHILS # BLD AUTO: 2.94 K/UL — SIGNIFICANT CHANGE UP (ref 1.8–7.4)
NEUTROPHILS NFR BLD AUTO: 61 % — SIGNIFICANT CHANGE UP (ref 43–77)
NRBC # BLD: 0 /100 WBCS — SIGNIFICANT CHANGE UP (ref 0–0)
PLATELET # BLD AUTO: 99 K/UL — LOW (ref 150–400)
POTASSIUM SERPL-MCNC: 4.1 MMOL/L — SIGNIFICANT CHANGE UP (ref 3.5–5.3)
POTASSIUM SERPL-SCNC: 4.1 MMOL/L — SIGNIFICANT CHANGE UP (ref 3.5–5.3)
PROT ?TM UR-MCNC: 338 MG/DL — HIGH (ref 0–12)
RBC # BLD: 2.94 M/UL — LOW (ref 4.2–5.8)
RBC # FLD: 14 % — SIGNIFICANT CHANGE UP (ref 10.3–14.5)
SODIUM SERPL-SCNC: 144 MMOL/L — SIGNIFICANT CHANGE UP (ref 135–145)
WBC # BLD: 4.85 K/UL — SIGNIFICANT CHANGE UP (ref 3.8–10.5)
WBC # FLD AUTO: 4.85 K/UL — SIGNIFICANT CHANGE UP (ref 3.8–10.5)

## 2023-01-23 PROCEDURE — 99232 SBSQ HOSP IP/OBS MODERATE 35: CPT

## 2023-01-23 RX ORDER — KETOTIFEN FUMARATE 0.34 MG/ML
1 SOLUTION OPHTHALMIC
Refills: 0 | Status: DISCONTINUED | OUTPATIENT
Start: 2023-01-24 | End: 2023-01-26

## 2023-01-23 RX ORDER — HYDRALAZINE HCL 50 MG
100 TABLET ORAL EVERY 8 HOURS
Refills: 0 | Status: DISCONTINUED | OUTPATIENT
Start: 2023-01-23 | End: 2023-01-26

## 2023-01-23 RX ORDER — KETOTIFEN FUMARATE 0.34 MG/ML
1 SOLUTION OPHTHALMIC ONCE
Refills: 0 | Status: COMPLETED | OUTPATIENT
Start: 2023-01-23 | End: 2023-01-23

## 2023-01-23 RX ORDER — KETOTIFEN FUMARATE 0.34 MG/ML
SOLUTION OPHTHALMIC
Refills: 0 | Status: DISCONTINUED | OUTPATIENT
Start: 2023-01-23 | End: 2023-01-26

## 2023-01-23 RX ADMIN — Medication 1 DROP(S): at 15:32

## 2023-01-23 RX ADMIN — SIMVASTATIN 20 MILLIGRAM(S): 20 TABLET, FILM COATED ORAL at 21:03

## 2023-01-23 RX ADMIN — Medication 5 MILLIGRAM(S): at 21:03

## 2023-01-23 RX ADMIN — Medication 100 MILLIGRAM(S): at 21:03

## 2023-01-23 RX ADMIN — Medication 2 MILLIGRAM(S): at 21:03

## 2023-01-23 RX ADMIN — Medication 50 MILLIGRAM(S): at 05:37

## 2023-01-23 RX ADMIN — AMLODIPINE BESYLATE 10 MILLIGRAM(S): 2.5 TABLET ORAL at 05:37

## 2023-01-23 RX ADMIN — ATENOLOL 25 MILLIGRAM(S): 25 TABLET ORAL at 05:37

## 2023-01-23 RX ADMIN — Medication 50 MILLIGRAM(S): at 15:31

## 2023-01-23 RX ADMIN — Medication 1 DROP(S): at 09:42

## 2023-01-23 RX ADMIN — Medication 1 DROP(S): at 18:09

## 2023-01-23 RX ADMIN — KETOTIFEN FUMARATE 1 DROP(S): 0.34 SOLUTION OPHTHALMIC at 18:10

## 2023-01-23 RX ADMIN — Medication 1 DROP(S): at 21:03

## 2023-01-23 RX ADMIN — CHLORHEXIDINE GLUCONATE 1 APPLICATION(S): 213 SOLUTION TOPICAL at 15:34

## 2023-01-23 RX ADMIN — HEPARIN SODIUM 5000 UNIT(S): 5000 INJECTION INTRAVENOUS; SUBCUTANEOUS at 05:38

## 2023-01-23 NOTE — DIETITIAN INITIAL EVALUATION ADULT - PERTINENT MEDS FT
MEDICATIONS  (STANDING):  amLODIPine   Tablet 10 milliGRAM(s) Oral daily  artificial  tears Solution 1 Drop(s) Both EYES every 4 hours  atenolol  Tablet 25 milliGRAM(s) Oral daily  bisacodyl 5 milliGRAM(s) Oral at bedtime  chlorhexidine 2% Cloths 1 Application(s) Topical daily  doxazosin 2 milliGRAM(s) Oral at bedtime  heparin   Injectable 5000 Unit(s) SubCutaneous every 12 hours  hydrALAZINE 50 milliGRAM(s) Oral every 8 hours  simvastatin 20 milliGRAM(s) Oral at bedtime    MEDICATIONS  (PRN):  acetaminophen     Tablet .. 650 milliGRAM(s) Oral every 6 hours PRN Mild Pain (1 - 3), Moderate Pain (4 - 6)  melatonin 3 milliGRAM(s) Oral at bedtime PRN Insomnia  sodium chloride 0.9% lock flush 10 milliLiter(s) IV Push every 1 hour PRN Pre/post blood products, medications, blood draw, and to maintain line patency

## 2023-01-23 NOTE — DIETITIAN INITIAL EVALUATION ADULT - NS FNS DIET ORDER
Diet, Renal Restrictions:   For patients receiving Renal Replacement - No Protein Restr, No Conc K, No Conc Phos, Low Sodium (01-17-23 @ 16:18)

## 2023-01-23 NOTE — DIETITIAN INITIAL EVALUATION ADULT - OTHER INFO
Pt seen on medical floor ( at dialysis) sleeping , for renal failure , volume overload, Length Of Stay. D/C planning  permacath placement and outpatient HD slot availability. New ESRD. HD scheduled for tomorrow. Pt receiving lasix. Left literature CKD diet literature for patients on Dialysis in patients chart as he was fast asleep w/ blanket pulled over head. RN aware. Pt continues to receive lasix. Unable to obtain usual diet / weight hx at this time.

## 2023-01-23 NOTE — CHART NOTE - NSCHARTNOTESELECT_GEN_ALL_CORE
Transition Communication Hand-off for Care Transitions to Next Level of Care Provider    Name: Pricilla Brown  : 1973  MRN #: [id number]  Primary Care Provider: Mario Lockhart     Primary Clinic: [facility] [address] [address]     Reason for Hospitalization:  Shortness of breath [R06.02]  Chronic atrial fibrillation (H) [I48.20]  Elevated troponin [R77.8]  Acute on chronic HFrEF (heart failure with reduced ejection fraction) (H) [I50.23]  Admit Date/Time: 2023  2:54 PM  Discharge Date: 2023  Payor Source: Payor: MEDICARE / Plan: MEDICARE / Product Type: Medicare /         Concern for non-adherence with plan of care:   NO  Discharge Needs Assessment:  Needs    Flowsheet Row Most Recent Value   Equipment Currently Used at Home cane, straight, walker, rolling           Any outstanding tests or procedures:        Referrals     Future Labs/Procedures    Medication Therapy Management Referral     Process Instructions:        This referral will be filtered to a centralized scheduling office at [facility] and the patient will receive a call to schedule an appointment at a [facility] location most convenient for them.    Scheduling Instructions:    MTM referral reason  Total Score: 2           Patient had a hospital or ED visit in last 6 months and has more than 10   PTA or Discharge medications    Patient has 5 PTA or Discharge Medications AND one of the following   diagnoses: DM,HF,COPD, or AMI DX       Comments:    This service is designed to help you get the most from your medications.  A specially trained pharmacist will work closely with you and your doctors  to solve any problems related to your medications and to help you get the   best results from taking them.      The Medication Therapy Management staff will call you to schedule an appointment.        Occupational Therapy Adult Consult     Comments:    Evaluate and treat as clinically  second touch indicated.    Reason:  deconditioning, weakness    Physical Therapy Adult Consult     Comments:    Evaluate and treat as clinically indicated.    Reason:  deconditioning, weakness            Key Recommendations:      Frank Hobbs    AVS/Discharge Summary is the source of truth; this is a helpful guide for improved communication of patient story

## 2023-01-23 NOTE — PROGRESS NOTE ADULT - SUBJECTIVE AND OBJECTIVE BOX
HOSPITALIST ATTENDING PROGRESS NOTE    Cc: Follow up for alea/esrd, hypekalemia, volume oveload    HPI: pt seen. leg swelling slowly going down. no sob    MEDICATIONS  (STANDING):  amLODIPine   Tablet 10 milliGRAM(s) Oral daily  artificial  tears Solution 1 Drop(s) Both EYES every 4 hours  atenolol  Tablet 25 milliGRAM(s) Oral daily  bisacodyl 5 milliGRAM(s) Oral at bedtime  chlorhexidine 2% Cloths 1 Application(s) Topical daily  doxazosin 2 milliGRAM(s) Oral at bedtime  heparin   Injectable 5000 Unit(s) SubCutaneous every 12 hours  hydrALAZINE 50 milliGRAM(s) Oral every 8 hours  simvastatin 20 milliGRAM(s) Oral at bedtime    MEDICATIONS  (PRN):  acetaminophen     Tablet .. 650 milliGRAM(s) Oral every 6 hours PRN Mild Pain (1 - 3), Moderate Pain (4 - 6)  melatonin 3 milliGRAM(s) Oral at bedtime PRN Insomnia  sodium chloride 0.9% lock flush 10 milliLiter(s) IV Push every 1 hour PRN Pre/post blood products, medications, blood draw, and to maintain line patency      Vital Signs Last 24 Hrs  T(C): 36.8 (23 Jan 2023 11:40), Max: 37.3 (23 Jan 2023 04:45)  T(F): 98.3 (23 Jan 2023 11:40), Max: 99.1 (23 Jan 2023 04:45)  HR: 64 (23 Jan 2023 11:40) (56 - 69)  BP: 166/87 (23 Jan 2023 11:40) (148/71 - 178/78)  BP(mean): --  RR: 18 (23 Jan 2023 11:40) (16 - 18)  SpO2: 99% (23 Jan 2023 11:40) (94% - 100%)    Parameters below as of 23 Jan 2023 11:40  Patient On (Oxygen Delivery Method): room air      GENERAL: NAD, well-groomed, well-developed  HEAD:  Atraumatic, Normocephalic  EYES: EOMI, PERRLA, conjunctiva and sclera clear  ENMT: No tonsillar erythema, exudates, or enlargement; Moist mucous membranes, Good dentition, No lesions  NECK: Supple,   NERVOUS SYSTEM:  Alert & Oriented X3, Good concentration; Motor Strength 5/5 B/L upper and lower extremities; DTRs 2+ intact and symmetric  CHEST/LUNG: Clear to percussion bilaterally; No rales, rhonchi, wheezing, or rubs  HEART: Regular rate and rhythm; No murmurs, rubs, or gallops  ABDOMEN: Soft, Nontender, Nondistended; Bowel sounds present  EXTREMITIES:  2+ Peripheral Pulses, No clubbing, cyanosis, or edema  SKIN: No rashes or lesions        LABS/RADIOLOGY                          8.7    4.92  )-----------( 94       ( 22 Jan 2023 07:03 )             27.2    01-22    139  |  106  |  49<H>  ----------------------------<  94  3.9   |  26  |  8.41<H>    Ca    7.8<L>      22 Jan 2023 07:03      CAPILLARY BLOOD GLUCOSE  CAPILLARY BLOOD GLUCOSE      POCT Blood Glucose.: 205 mg/dL (23 Jan 2023 11:36)     HOSPITALIST ATTENDING PROGRESS NOTE    Cc: Follow up for alea/esrd, hypekalemia, volume oveload    HPI: pt seen. leg swelling slowly going down. no sob    MEDICATIONS  (STANDING):  amLODIPine   Tablet 10 milliGRAM(s) Oral daily  artificial  tears Solution 1 Drop(s) Both EYES every 4 hours  atenolol  Tablet 25 milliGRAM(s) Oral daily  bisacodyl 5 milliGRAM(s) Oral at bedtime  chlorhexidine 2% Cloths 1 Application(s) Topical daily  doxazosin 2 milliGRAM(s) Oral at bedtime  heparin   Injectable 5000 Unit(s) SubCutaneous every 12 hours  hydrALAZINE 50 milliGRAM(s) Oral every 8 hours  simvastatin 20 milliGRAM(s) Oral at bedtime    MEDICATIONS  (PRN):  acetaminophen     Tablet .. 650 milliGRAM(s) Oral every 6 hours PRN Mild Pain (1 - 3), Moderate Pain (4 - 6)  melatonin 3 milliGRAM(s) Oral at bedtime PRN Insomnia  sodium chloride 0.9% lock flush 10 milliLiter(s) IV Push every 1 hour PRN Pre/post blood products, medications, blood draw, and to maintain line patency      Vital Signs Last 24 Hrs  T(C): 36.8 (23 Jan 2023 11:40), Max: 37.3 (23 Jan 2023 04:45)  T(F): 98.3 (23 Jan 2023 11:40), Max: 99.1 (23 Jan 2023 04:45)  HR: 64 (23 Jan 2023 11:40) (56 - 69)  BP: 166/87 (23 Jan 2023 11:40) (148/71 - 178/78)  BP(mean): --  RR: 18 (23 Jan 2023 11:40) (16 - 18)  SpO2: 99% (23 Jan 2023 11:40) (94% - 100%)    Parameters below as of 23 Jan 2023 11:40  Patient On (Oxygen Delivery Method): room air      GENERAL: NAD, well-groomed, well-developed  HEAD:  Atraumatic, Normocephalic  EYES: EOMI, PERRLA, conjunctiva and sclera clear  ENMT: No tonsillar erythema, exudates, or enlargement; Moist mucous membranes, Good dentition, No lesions  NECK: Supple,   NERVOUS SYSTEM:  Alert & Oriented X3, Good concentration; Motor Strength 5/5 B/L upper and lower extremities; DTRs 2+ intact and symmetric  CHEST/LUNG: Clear to percussion bilaterally; No rales, rhonchi, wheezing, or rubs  HEART: Regular rate and rhythm; No murmurs, rubs, or gallops  ABDOMEN: Soft, Nontender, Nondistended; Bowel sounds present  EXTREMITIES:  2+ Peripheral Pulses, No clubbing, cyanosis, + edema  SKIN: No rashes or lesions        LABS/RADIOLOGY                          8.7    4.92  )-----------( 94       ( 22 Jan 2023 07:03 )             27.2    01-22    139  |  106  |  49<H>  ----------------------------<  94  3.9   |  26  |  8.41<H>    Ca    7.8<L>      22 Jan 2023 07:03      CAPILLARY BLOOD GLUCOSE  CAPILLARY BLOOD GLUCOSE      POCT Blood Glucose.: 205 mg/dL (23 Jan 2023 11:36)

## 2023-01-23 NOTE — DIETITIAN INITIAL EVALUATION ADULT - PERTINENT LABORATORY DATA
01-23    144  |  110<H>  |  57<H>  ----------------------------<  81  4.1   |  26  |  9.49<H>    Ca    7.6<L>      23 Jan 2023 07:30    POCT Blood Glucose.: 205 mg/dL (01-23-23 @ 11:36)  A1C with Estimated Average Glucose Result: 5.5 % (01-18-23 @ 05:40)

## 2023-01-23 NOTE — DIETITIAN INITIAL EVALUATION ADULT - DIET TYPE
8 oz Nepro = 425 kcal & 19.1 g pro/DASH/TLC (sodium and cholesterol restricted diet)/renal replacement pts:no protein restr,no conc K & phos, low sodium/supplement (specify)

## 2023-01-23 NOTE — PROGRESS NOTE ADULT - SUBJECTIVE AND OBJECTIVE BOX
[facility] NEPHROLOGY SERVICES, [facility]  NEPHROLOGY AND HYPERTENSION  [address]  [address]  [place] [address]  [phone]    MD MAUREEN SANCHEZ MD ANDREY GONCHARUK, MD MADHU KORRAPATI, MD YELENA ROSENBERG, MD BINNY KOSHY, MD CHRISTOPHER CAPUTO, MD LUCRETIA WILKERSON MD          Patient events noted    MEDICATIONS  (STANDING):  amLODIPine   Tablet 10 milliGRAM(s) Oral daily  artificial  tears Solution 1 Drop(s) Both EYES every 4 hours  atenolol  Tablet 25 milliGRAM(s) Oral daily  bisacodyl 5 milliGRAM(s) Oral at bedtime  chlorhexidine 2% Cloths 1 Application(s) Topical daily  doxazosin 2 milliGRAM(s) Oral at bedtime  heparin   Injectable 5000 Unit(s) SubCutaneous every 12 hours  hydrALAZINE 100 milliGRAM(s) Oral every 8 hours  ketotifen 0.025% Ophthalmic Solution      simvastatin 20 milliGRAM(s) Oral at bedtime    MEDICATIONS  (PRN):  acetaminophen     Tablet .. 650 milliGRAM(s) Oral every 6 hours PRN Mild Pain (1 - 3), Moderate Pain (4 - 6)  melatonin 3 milliGRAM(s) Oral at bedtime PRN Insomnia  sodium chloride 0.9% lock flush 10 milliLiter(s) IV Push every 1 hour PRN Pre/post blood products, medications, blood draw, and to maintain line patency      01-23-23 @ 07:01  -  01-23-23 @ 22:00  --------------------------------------------------------  IN: 0 mL / OUT: 2500 mL / NET: -2500 mL      PHYSICAL EXAM:      T(C): 36.6 (01-23-23 @ 17:22), Max: 37.3 (01-23-23 @ 04:45)  HR: 67 (01-23-23 @ 20:53) (56 - 69)  BP: 168/71 (01-23-23 @ 20:53) (164/74 - 182/81)  RR: 18 (01-23-23 @ 17:22) (16 - 20)  SpO2: 95% (01-23-23 @ 17:22) (94% - 100%)  Wt(kg): --  Lungs clear  Heart S1S2  Abd soft NT ND  Extremities:   tr edema                                    8.4    4.85  )-----------( 99       ( 23 Jan 2023 07:30 )             26.5     01-23    144  |  110<H>  |  57<H>  ----------------------------<  81  4.1   |  26  |  9.49<H>    Ca    7.6<L>      23 Jan 2023 07:30          Creatinine Trend: 9.49<--, 8.41<--, 10.70<--, 12.60<--, 12.40<--, 11.80<--      Assessment   New ESRD; maintenance    Plan:  HD for today  Discharge planning     Tang Cano MD

## 2023-01-24 PROCEDURE — 99232 SBSQ HOSP IP/OBS MODERATE 35: CPT

## 2023-01-24 RX ADMIN — Medication 650 MILLIGRAM(S): at 10:37

## 2023-01-24 RX ADMIN — Medication 1 DROP(S): at 21:05

## 2023-01-24 RX ADMIN — Medication 650 MILLIGRAM(S): at 09:44

## 2023-01-24 RX ADMIN — KETOTIFEN FUMARATE 1 DROP(S): 0.34 SOLUTION OPHTHALMIC at 05:17

## 2023-01-24 RX ADMIN — Medication 2 MILLIGRAM(S): at 21:08

## 2023-01-24 RX ADMIN — Medication 1 DROP(S): at 05:17

## 2023-01-24 RX ADMIN — SIMVASTATIN 20 MILLIGRAM(S): 20 TABLET, FILM COATED ORAL at 21:07

## 2023-01-24 RX ADMIN — Medication 100 MILLIGRAM(S): at 05:16

## 2023-01-24 RX ADMIN — Medication 100 MILLIGRAM(S): at 21:07

## 2023-01-24 RX ADMIN — CHLORHEXIDINE GLUCONATE 1 APPLICATION(S): 213 SOLUTION TOPICAL at 12:10

## 2023-01-24 RX ADMIN — Medication 100 MILLIGRAM(S): at 12:09

## 2023-01-24 RX ADMIN — Medication 1 DROP(S): at 09:04

## 2023-01-24 RX ADMIN — HEPARIN SODIUM 5000 UNIT(S): 5000 INJECTION INTRAVENOUS; SUBCUTANEOUS at 05:21

## 2023-01-24 RX ADMIN — Medication 1 DROP(S): at 12:06

## 2023-01-24 RX ADMIN — AMLODIPINE BESYLATE 10 MILLIGRAM(S): 2.5 TABLET ORAL at 05:17

## 2023-01-24 RX ADMIN — ATENOLOL 25 MILLIGRAM(S): 25 TABLET ORAL at 05:17

## 2023-01-24 NOTE — PROGRESS NOTE ADULT - SUBJECTIVE AND OBJECTIVE BOX
HOSPITALIST ATTENDING PROGRESS NOTE    Cc: Follow up for alea/esrd, hypekalemia, volume oveload    HPI: pt seen. leg swelling  going down. no sob          MEDICATIONS  (STANDING):  amLODIPine   Tablet 10 milliGRAM(s) Oral daily  artificial  tears Solution 1 Drop(s) Both EYES every 4 hours  atenolol  Tablet 25 milliGRAM(s) Oral daily  bisacodyl 5 milliGRAM(s) Oral at bedtime  chlorhexidine 2% Cloths 1 Application(s) Topical daily  doxazosin 2 milliGRAM(s) Oral at bedtime  heparin   Injectable 5000 Unit(s) SubCutaneous every 12 hours  hydrALAZINE 100 milliGRAM(s) Oral every 8 hours  ketotifen 0.025% Ophthalmic Solution      ketotifen 0.025% Ophthalmic Solution 1 Drop(s) Both EYES two times a day  simvastatin 20 milliGRAM(s) Oral at bedtime    MEDICATIONS  (PRN):  acetaminophen     Tablet .. 650 milliGRAM(s) Oral every 6 hours PRN Mild Pain (1 - 3), Moderate Pain (4 - 6)  melatonin 3 milliGRAM(s) Oral at bedtime PRN Insomnia  sodium chloride 0.9% lock flush 10 milliLiter(s) IV Push every 1 hour PRN Pre/post blood products, medications, blood draw, and to maintain line patency      Vital Signs Last 24 Hrs  T(C): 37.5 (24 Jan 2023 04:45), Max: 37.5 (24 Jan 2023 04:45)  T(F): 99.5 (24 Jan 2023 04:45), Max: 99.5 (24 Jan 2023 04:45)  HR: 65 (24 Jan 2023 04:45) (56 - 67)  BP: 177/61 (24 Jan 2023 04:45) (164/74 - 182/81)  BP(mean): --  RR: 18 (24 Jan 2023 04:45) (16 - 20)  SpO2: 97% (24 Jan 2023 04:45) (95% - 100%)    Parameters below as of 23 Jan 2023 17:22  Patient On (Oxygen Delivery Method): room air        GENERAL: NAD, well-groomed, well-developed  HEAD:  Atraumatic, Normocephalic  EYES: EOMI, PERRLA, conjunctiva and sclera clear  ENMT: No tonsillar erythema, exudates, or enlargement; Moist mucous membranes, Good dentition, No lesions  NECK: Supple,   NERVOUS SYSTEM:  Alert & Oriented X3, Good concentration; Motor Strength 5/5 B/L upper and lower extremities; DTRs 2+ intact and symmetric  CHEST/LUNG: Clear to percussion bilaterally; No rales, rhonchi, wheezing, or rubs  HEART: Regular rate and rhythm; No murmurs, rubs, or gallops  ABDOMEN: Soft, Nontender, Nondistended; Bowel sounds present  EXTREMITIES:  2+ Peripheral Pulses, No clubbing, cyanosis, + edema  SKIN: No rashes or lesions        LABS/RADIOLOGY               CAPILLARY BLOOD GLUCOSE      POCT Blood Glucose.: 205 mg/dL (23 Jan 2023 11:36)

## 2023-01-25 LAB
GLUCOSE BLDC GLUCOMTR-MCNC: 124 MG/DL — HIGH (ref 70–99)
GLUCOSE BLDC GLUCOMTR-MCNC: 130 MG/DL — HIGH (ref 70–99)
GLUCOSE BLDC GLUCOMTR-MCNC: 149 MG/DL — HIGH (ref 70–99)
GLUCOSE BLDC GLUCOMTR-MCNC: 157 MG/DL — HIGH (ref 70–99)

## 2023-01-25 PROCEDURE — 99233 SBSQ HOSP IP/OBS HIGH 50: CPT

## 2023-01-25 RX ORDER — SENNA PLUS 8.6 MG/1
2 TABLET ORAL AT BEDTIME
Refills: 0 | Status: DISCONTINUED | OUTPATIENT
Start: 2023-01-25 | End: 2023-01-26

## 2023-01-25 RX ORDER — FERROUS SULFATE 325(65) MG
325 TABLET ORAL DAILY
Refills: 0 | Status: DISCONTINUED | OUTPATIENT
Start: 2023-01-25 | End: 2023-01-26

## 2023-01-25 RX ADMIN — Medication 325 MILLIGRAM(S): at 17:32

## 2023-01-25 RX ADMIN — Medication 2 MILLIGRAM(S): at 21:59

## 2023-01-25 RX ADMIN — SIMVASTATIN 20 MILLIGRAM(S): 20 TABLET, FILM COATED ORAL at 21:59

## 2023-01-25 RX ADMIN — CHLORHEXIDINE GLUCONATE 1 APPLICATION(S): 213 SOLUTION TOPICAL at 17:32

## 2023-01-25 RX ADMIN — KETOTIFEN FUMARATE 1 DROP(S): 0.34 SOLUTION OPHTHALMIC at 17:33

## 2023-01-25 RX ADMIN — Medication 100 MILLIGRAM(S): at 06:03

## 2023-01-25 RX ADMIN — Medication 1 DROP(S): at 21:58

## 2023-01-25 RX ADMIN — Medication 100 MILLIGRAM(S): at 21:59

## 2023-01-25 RX ADMIN — Medication 1 DROP(S): at 06:03

## 2023-01-25 RX ADMIN — Medication 100 MILLIGRAM(S): at 17:32

## 2023-01-25 RX ADMIN — AMLODIPINE BESYLATE 10 MILLIGRAM(S): 2.5 TABLET ORAL at 06:04

## 2023-01-25 RX ADMIN — ATENOLOL 25 MILLIGRAM(S): 25 TABLET ORAL at 06:03

## 2023-01-25 RX ADMIN — KETOTIFEN FUMARATE 1 DROP(S): 0.34 SOLUTION OPHTHALMIC at 06:05

## 2023-01-25 RX ADMIN — Medication 1 DROP(S): at 17:34

## 2023-01-25 RX ADMIN — Medication 3 MILLIGRAM(S): at 21:59

## 2023-01-25 RX ADMIN — HEPARIN SODIUM 5000 UNIT(S): 5000 INJECTION INTRAVENOUS; SUBCUTANEOUS at 17:32

## 2023-01-25 NOTE — PROGRESS NOTE ADULT - REASON FOR ADMISSION
MIESHA on CKD, likely ESRD. accelerated hypertension
MIESAH on CKD, likely ESRD. accelerated hypertension
MIESHA on CKD, likely ESRD. accelerated hypertension

## 2023-01-25 NOTE — PROGRESS NOTE ADULT - PROVIDER SPECIALTY LIST ADULT
Hospitalist
Hospitalist
Nephrology
Nephrology
Hospitalist
Nephrology
Hospitalist
Nephrology
Nephrology
Hospitalist

## 2023-01-25 NOTE — PROGRESS NOTE ADULT - SUBJECTIVE AND OBJECTIVE BOX
Patient is a 67y old  Male who presents with a chief complaint of MIESHA on CKD, likely ESRD. accelerated hypertension (24 Jan 2023 09:48)      INTERVAL HPI/ OVERNIGHT EVENTS: Pt was seen and examined at bedside today, fever in am, pt denies any acute complaints.      MEDICATIONS  (STANDING):  amLODIPine   Tablet 10 milliGRAM(s) Oral daily  artificial  tears Solution 1 Drop(s) Both EYES every 4 hours  atenolol  Tablet 25 milliGRAM(s) Oral daily  bisacodyl 5 milliGRAM(s) Oral at bedtime  chlorhexidine 2% Cloths 1 Application(s) Topical daily  doxazosin 2 milliGRAM(s) Oral at bedtime  heparin   Injectable 5000 Unit(s) SubCutaneous every 12 hours  hydrALAZINE 100 milliGRAM(s) Oral every 8 hours  ketotifen 0.025% Ophthalmic Solution      ketotifen 0.025% Ophthalmic Solution 1 Drop(s) Both EYES two times a day  simvastatin 20 milliGRAM(s) Oral at bedtime    MEDICATIONS  (PRN):  acetaminophen     Tablet .. 650 milliGRAM(s) Oral every 6 hours PRN Mild Pain (1 - 3), Moderate Pain (4 - 6)  melatonin 3 milliGRAM(s) Oral at bedtime PRN Insomnia  sodium chloride 0.9% lock flush 10 milliLiter(s) IV Push every 1 hour PRN Pre/post blood products, medications, blood draw, and to maintain line patency      Allergies    No Known Allergies    Intolerances        REVIEW OF SYSTEMS:    Unable to examine due to [ ] Encephalopathy [ ] Advanced Dementia [ ] Expressive Aphasia [ ] Non-verbal patient    CONSTITUTIONAL: No fever, NO generalized weakness/Fatigue, No weight loss  EYES: No eye pain, visual disturbances, or discharge  ENMT:  No difficulty hearing, tinnitus, vertigo; No sinus or throat pain  NECK: No pain or stiffness  RESPIRATORY: No shortness of breath,  cough, wheezing, sputum or hemoptysis   CARDIOVASCULAR: No chest pain, palpitations, or leg swelling  GASTROINTESTINAL: No abdominal pain. No nausea, vomiting, diarrhea or constipation. No melena or hematochezia.  GENITOURINARY: No dysuria, frequency, hematuria, or incontinence  NEUROLOGICAL: No headaches, Dizziness, memory loss, loss of strength, numbness, or tremors  SKIN: No itching, burning, rashes, or lesions   MUSCULOSKELETAL: No joint pain or swelling; No muscle, back, or extremity pain  PSYCHIATRIC: No depression, anxiety, mood swings, or difficulty sleeping  HEME/LYMPH: No easy bruising, or bleeding gums      Vital Signs Last 24 Hrs  T(C): 36.7 (25 Jan 2023 15:00), Max: 38 (25 Jan 2023 05:10)  T(F): 98.1 (25 Jan 2023 15:00), Max: 100.4 (25 Jan 2023 05:10)  HR: 64 (25 Jan 2023 15:00) (61 - 68)  BP: 140/79 (25 Jan 2023 15:00) (124/58 - 185/78)  BP(mean): 114 (24 Jan 2023 21:01) (114 - 114)  RR: 18 (25 Jan 2023 15:00) (17 - 18)  SpO2: 100% (25 Jan 2023 15:00) (96% - 100%)    Parameters below as of 25 Jan 2023 15:00  Patient On (Oxygen Delivery Method): room air        PHYSICAL EXAM:  GENERAL: NAD, well-developed, well-groomed  HEAD:  Atraumatic, Normocephalic  EYES: conjunctiva and sclera clear  ENMT: Moist mucous membranes  NECK: Supple, No JVD, Normal thyroid  CHEST/LUNG: Clear to Auscultation bilaterally; No rales, rhonchi, wheezing, or rubs  HEART: Regular rate and rhythm; No murmurs, rubs, or gallops  ABDOMEN: Soft, Nontender, Nondistended; Bowel sounds present  EXTREMITIES:  2+ Peripheral Pulses, No clubbing, cyanosis, or edema  SKIN: No rashes or lesions  NERVOUS SYSTEM:  Alert & Oriented X3, Good concentration; Motor Strength 5/5 B/L upper and lower extremities    LABS:              CAPILLARY BLOOD GLUCOSE      POCT Blood Glucose.: 124 mg/dL (25 Jan 2023 11:08)  POCT Blood Glucose.: 130 mg/dL (25 Jan 2023 07:46)          RADIOLOGY & ADDITIONAL TESTS:          Imaging Personally Reviewed:  [ ] YES  [ ] NO    Consultant(s) Notes Reviewed:  [ ] YES  [ ] NO    Care Discussed with Consultants/Other Providers [x ] YES  [ ] NO

## 2023-01-25 NOTE — PROGRESS NOTE ADULT - ASSESSMENT
ASSESMENT/PLAN    67 years old male with h/o HTN, HLD, CKD present to ED with complain of worsening LE edema, SOB and abnormal labs results with elevated Cr. Patient was seen by PCP, just started on lasix 20mg 4 days PTA. Was informed to come to ED on Friday but patient unable to come to ED due to some issue at home. Reported progressive SOB on exertion with orthopnea and PND. No chest pain.   Hypertensive to 214/99, afebrile, sat well at RA. No leukocytosis, Hb 7.5, plt 168, Na 146, K 6, Cr 11.9, proBNP [id number]. CXR image reviewed, no focal consolidation Noted central pul venous congestion. LE doppler negative for DVT. Ultrasound kidney with no hydronephrosis. Renal parenchymal disease. Possible chronic bladder outlet obstruction.     Admitted with MIESHA on CKD/progressing to ESRD, accelerated HTN, fluid overload, hyperkalemia       # Acute kidney injury superimposed on CKD, now ESRD, volume overload, hyperkalemia. Patient on HD first time was 1/20/2023 and then 1/21/2023 and 1/23/2023   start dc planing per renal  -- d/w Sw regarding dispo planning    # presumed anemia of  CKD, no sign of GI bleeding   .   -s/p 2 units PRBC, appropriate rise in Hb. Hb stable. Procrit with HD as per nephrology     urinary retention  -cont matos catheter, doxazosin.   Voiding trial today     #Accelerated hypertension. Hypertensive to 214/99. Received IV hydralazine 10mg and atenolol 25mg oral in ED  Continue amlodipine 10mg, hydralazine 50mg tid, doxazosin 2mg and continue with atenolol 25mg daily  Monitor BP and titrate BP meds. Should improve/lower further with HD  1/23/2023 may need increase in bp med hydralazine --75tid  1/24/2023 in fact meds were increased to hydralazine 100mg tid    ·Echo: normal LVEF, grade 2 diastolic HF, LVH      #Hyperlipidemia,    statin.       DVT Px  Heparin     Dispo: need outpt HD   
67 years old male with h/o HTN, HLD, CKD present to ED with complain of worsening LE edema, SOB and abnormal labs results with elevated Cr. Patient was seen by PCP, just started on lasix 20mg 4 days ago. Was informed to come to ED on Friday but patient unable to come to ED due to some issue at home. Reported progressive SOB on exertion with orthopnea and PND. No chest pain.   Hypertensive to 214/99, afebrile, sat well at RA. No leukocytosis, Hb 7.5, plt 168, Na 146, K 6, Cr 11.9, proBNP [id number]. CXR image reviewed, no focal consolidation Noted central pul venous congestion. LE doppler negative for DVT. Ultrasound kidney with no hydronephrosis. Renal parenchymal disease. Possible chronic bladder outlet obstruction.     Admitted with MIESHA on CKD/progressing to ESRD, accelerated HTN, fluid overload, hyperkalemia 
    ASSESMENT/PLAN    67 years old male with h/o HTN, HLD, CKD present to ED with complain of worsening LE edema, SOB and abnormal labs results with elevated Cr. Patient was seen by PCP, just started on lasix 20mg 4 days PTA. Was informed to come to ED on Friday but patient unable to come to ED due to some issue at home. Reported progressive SOB on exertion with orthopnea and PND. No chest pain.   Hypertensive to 214/99, afebrile, sat well at RA. No leukocytosis, Hb 7.5, plt 168, Na 146, K 6, Cr 11.9, proBNP [id number]. CXR image reviewed, no focal consolidation Noted central pul venous congestion. LE doppler negative for DVT. Ultrasound kidney with no hydronephrosis. Renal parenchymal disease. Possible chronic bladder outlet obstruction.     Admitted with MIESHA on CKD/progressing to ESRD, accelerated HTN, fluid overload, hyperkalemia       # Acute kidney injury superimposed on CKD, now ESRD, volume overload, hyperkalemia. Patient on HD first time was 1/20/2023 and then 1/21/2023    for HD on today -- d/w Sw regarding dispo planning    # presumed anemia of  CKD, no sign of GI bleeding   .   -s/p 2 units PRBC, appropriate rise in Hb. Hb stable. Procrit with HD as per nephrology     urinary retention  -cont matos catheter, doxazosin.   Voiding trial today     #Accelerated hypertension. Hypertensive to 214/99. Received IV hydralazine 10mg and atenolol 25mg oral in ED  Continue amlodipine 10mg, hydralazine 50mg tid, doxazosin 2mg and continue with atenolol 25mg daily  Monitor BP and titrate BP meds. Should improve/lower further with HD  1/23/2023 may needd increase in bp med hydralazine --75tid    ·Echo: normal LVEF, grade 2 diastolic HF, LVH      #Hyperlipidemia,    statin.       DVT Px  Heparin     Dispo: need outpt HD   
67 years old male with h/o HTN, HLD, CKD present to ED with complain of worsening LE edema, SOB and abnormal labs results with elevated Cr. Patient was seen by PCP, just started on lasix 20mg 4 days PTA. Was informed to come to ED on Friday but patient unable to come to ED due to some issue at home. Reported progressive SOB on exertion with orthopnea and PND. No chest pain.   Hypertensive to 214/99, afebrile, sat well at RA. No leukocytosis, Hb 7.5, plt 168, Na 146, K 6, Cr 11.9, proBNP 04357. CXR image reviewed, no focal consolidation Noted central pul venous congestion. LE doppler negative for DVT. Ultrasound kidney with no hydronephrosis. Renal parenchymal disease. Possible chronic bladder outlet obstruction.     Admitted with MIESHA on CKD/progressing to ESRD, accelerated HTN, fluid overload, hyperkalemia       # ESRD/ New HD   associated volume overload, hyperkalemia POA.   Nephrology on board   HD initiated 1/20/2023  Hyperkalemia: resolved w/ HD   Discharge planning; HD center placement pending     # HTN emergency  - Hypertensive to 214/99.  - Continue amlodipine, hydralazine, doxazosin and atenolol    # Iron Deficiency Anemia   - Anemia of chronic renal disease   - s/p 2 units PRBC,   - Procrit with HD as per nephrology   - ferrous sulfate     # Urinary retention  -cont matos catheter, doxazosin.     # Chronic Diastolic CHF   ·Echo: normal LVEF, grade 2 diastolic HF, LVH  -  fluid balance w/ HD     Dvtp: Heparin   Dispo: need outpt HD Slot

## 2023-01-25 NOTE — PROGRESS NOTE ADULT - SUBJECTIVE AND OBJECTIVE BOX
[facility] NEPHROLOGY SERVICES, [facility]  NEPHROLOGY AND HYPERTENSION  [address]  [address]  [place] [address]  [phone]    MD MAUREEN SANCHEZ MD ANDREY GONCHARUK, MD MADHU KORRAPATI, MD YELENA ROSENBERG, MD BINNY KOSHY, MD CHRISTOPHER CAPUTO, MD LUCRETIA WILKERSON MD          Patient events noted    MEDICATIONS  (STANDING):  amLODIPine   Tablet 10 milliGRAM(s) Oral daily  artificial  tears Solution 1 Drop(s) Both EYES every 4 hours  atenolol  Tablet 25 milliGRAM(s) Oral daily  bisacodyl 5 milliGRAM(s) Oral at bedtime  chlorhexidine 2% Cloths 1 Application(s) Topical daily  doxazosin 2 milliGRAM(s) Oral at bedtime  ferrous    sulfate 325 milliGRAM(s) Oral daily  heparin   Injectable 5000 Unit(s) SubCutaneous every 12 hours  hydrALAZINE 100 milliGRAM(s) Oral every 8 hours  ketotifen 0.025% Ophthalmic Solution      ketotifen 0.025% Ophthalmic Solution 1 Drop(s) Both EYES two times a day  senna 2 Tablet(s) Oral at bedtime  simvastatin 20 milliGRAM(s) Oral at bedtime    MEDICATIONS  (PRN):  acetaminophen     Tablet .. 650 milliGRAM(s) Oral every 6 hours PRN Mild Pain (1 - 3), Moderate Pain (4 - 6)  melatonin 3 milliGRAM(s) Oral at bedtime PRN Insomnia  sodium chloride 0.9% lock flush 10 milliLiter(s) IV Push every 1 hour PRN Pre/post blood products, medications, blood draw, and to maintain line patency      01-24-23 @ 07:01 - 01-25-23 @ 07:00  --------------------------------------------------------  IN: 300 mL / OUT: 0 mL / NET: 300 mL    01-25-23 @ 07:01  -  01-25-23 @ 20:21  --------------------------------------------------------  IN: 900 mL / OUT: 3400 mL / NET: -2500 mL      PHYSICAL EXAM:      T(C): 37.2 (01-25-23 @ 16:36), Max: 38 (01-25-23 @ 05:10)  HR: 66 (01-25-23 @ 16:36) (61 - 68)  BP: 144/66 (01-25-23 @ 16:36) (124/58 - 185/78)  RR: 18 (01-25-23 @ 16:36) (17 - 18)  SpO2: 100% (01-25-23 @ 16:36) (96% - 100%)  Wt(kg): --  Lungs clear  Heart S1S2  Abd soft NT ND  Extremities:   tr edema                          Creatinine Trend: 9.49<--, 8.41<--, 10.70<--, 12.60<--, 12.40<--, 11.80<--      Assessment   New ESRD; maintenance    Plan:  HD for today  UF as tolerated  AV access placement; PD education   Transplant evaluation planning     Tang Cano MD

## 2023-01-26 ENCOUNTER — TRANSCRIPTION ENCOUNTER (OUTPATIENT)
Age: 68
End: 2023-01-26

## 2023-01-26 VITALS
OXYGEN SATURATION: 98 % | HEART RATE: 63 BPM | RESPIRATION RATE: 18 BRPM | SYSTOLIC BLOOD PRESSURE: 158 MMHG | TEMPERATURE: 98 F | DIASTOLIC BLOOD PRESSURE: 74 MMHG

## 2023-01-26 LAB
RAPID RVP RESULT: SIGNIFICANT CHANGE UP
SARS-COV-2 RNA SPEC QL NAA+PROBE: SIGNIFICANT CHANGE UP

## 2023-01-26 PROCEDURE — 99233 SBSQ HOSP IP/OBS HIGH 50: CPT

## 2023-01-26 RX ORDER — FUROSEMIDE 40 MG
2 TABLET ORAL
Qty: 0 | Refills: 0 | DISCHARGE

## 2023-01-26 RX ORDER — FERROUS SULFATE 325(65) MG
1 TABLET ORAL
Qty: 30 | Refills: 0
Start: 2023-01-26 | End: 2023-02-24

## 2023-01-26 RX ORDER — POTASSIUM CHLORIDE 20 MEQ
1 PACKET (EA) ORAL
Qty: 0 | Refills: 0 | DISCHARGE

## 2023-01-26 RX ORDER — AMLODIPINE BESYLATE 2.5 MG/1
1 TABLET ORAL
Qty: 30 | Refills: 0
Start: 2023-01-26 | End: 2023-02-24

## 2023-01-26 RX ORDER — SENNA PLUS 8.6 MG/1
2 TABLET ORAL
Qty: 60 | Refills: 0
Start: 2023-01-26 | End: 2023-02-24

## 2023-01-26 RX ORDER — FUROSEMIDE 40 MG
2 TABLET ORAL
Qty: 60 | Refills: 0
Start: 2023-01-26 | End: 2023-02-24

## 2023-01-26 RX ORDER — DOXAZOSIN MESYLATE 4 MG
1 TABLET ORAL
Qty: 30 | Refills: 0
Start: 2023-01-26 | End: 2023-02-24

## 2023-01-26 RX ORDER — HYDRALAZINE HCL 50 MG
1 TABLET ORAL
Qty: 0 | Refills: 0 | DISCHARGE

## 2023-01-26 RX ORDER — FUROSEMIDE 40 MG
40 TABLET ORAL ONCE
Refills: 0 | Status: COMPLETED | OUTPATIENT
Start: 2023-01-26 | End: 2023-01-26

## 2023-01-26 RX ORDER — SIMVASTATIN 20 MG/1
1 TABLET, FILM COATED ORAL
Qty: 30 | Refills: 0
Start: 2023-01-26 | End: 2023-02-24

## 2023-01-26 RX ORDER — HYDRALAZINE HCL 50 MG
1 TABLET ORAL
Qty: 90 | Refills: 0
Start: 2023-01-26 | End: 2023-02-24

## 2023-01-26 RX ADMIN — Medication 650 MILLIGRAM(S): at 05:38

## 2023-01-26 RX ADMIN — Medication 100 MILLIGRAM(S): at 05:39

## 2023-01-26 RX ADMIN — AMLODIPINE BESYLATE 10 MILLIGRAM(S): 2.5 TABLET ORAL at 05:39

## 2023-01-26 RX ADMIN — Medication 100 MILLIGRAM(S): at 15:10

## 2023-01-26 RX ADMIN — Medication 1 DROP(S): at 09:36

## 2023-01-26 RX ADMIN — CHLORHEXIDINE GLUCONATE 1 APPLICATION(S): 213 SOLUTION TOPICAL at 11:49

## 2023-01-26 RX ADMIN — KETOTIFEN FUMARATE 1 DROP(S): 0.34 SOLUTION OPHTHALMIC at 05:40

## 2023-01-26 RX ADMIN — Medication 325 MILLIGRAM(S): at 11:57

## 2023-01-26 RX ADMIN — Medication 1 DROP(S): at 05:39

## 2023-01-26 RX ADMIN — ATENOLOL 25 MILLIGRAM(S): 25 TABLET ORAL at 05:39

## 2023-01-26 RX ADMIN — Medication 40 MILLIGRAM(S): at 15:10

## 2023-01-26 RX ADMIN — Medication 1 DROP(S): at 14:58

## 2023-01-26 NOTE — DISCHARGE NOTE PROVIDER - HOSPITAL COURSE
67 years old male with h/o HTN, HLD, CKD present to ED with complain of worsening LE edema, SOB and abnormal labs results with elevated Cr. Patient was seen by PCP, just started on lasix 20mg 4 days PTA. Was informed to come to ED on Friday but patient unable to come to ED due to some issue at home. Reported progressive SOB on exertion with orthopnea and PND. No chest pain.     ER course: Hypertensive to 214/99, afebrile, sat well at RA. No leukocytosis, Hb 7.5, plt 168, Na 146, K 6, Cr 11.9, proBNP 89373.   CXR image reviewed, no focal consolidation Noted central pul venous congestion.   LE doppler negative for DVT.   Ultrasound kidney with no hydronephrosis. Renal parenchymal disease. Possible chronic bladder outlet obstruction.     The patient was Admitted with MIESHA on CKD to progressing to ESRD, HTN emergency, fluid overload, and hyperkalemia.        # ESRD/ New HD   associated volume overload, hyperkalemia POA.   Nephrology on board   HD initiated 1/20/2023  Hyperkalemia: resolved w/ HD   Discharge planning; HD center placement pending     # HTN emergency  - Hypertensive to 214/99.  - Continue amlodipine, hydralazine, doxazosin and atenolol    # Iron Deficiency Anemia   - Anemia of chronic renal disease   - s/p 2 units PRBC,   - Procrit with HD as per nephrology   - ferrous sulfate     # Urinary retention  -cont matos catheter, doxazosin.     # Chronic Diastolic CHF   ·Echo: normal LVEF, grade 2 diastolic HF, LVH  -  fluid balance w/ HD    67 years old male with h/o HTN, HLD, CKD present to ED with complain of worsening LE edema, SOB and abnormal labs results with elevated Cr. Patient was seen by PCP, just started on lasix 20mg 4 days PTA. Was informed to come to ED on Friday but patient unable to come to ED due to some issue at home. Reported progressive SOB on exertion with orthopnea and PND. No chest pain.     ER course: Hypertensive to 214/99, afebrile, sat well at RA. No leukocytosis, Hb 7.5, plt 168, Na 146, K 6, Cr 11.9, proBNP 23641.   CXR image reviewed, no focal consolidation Noted central pul venous congestion.   LE doppler negative for DVT.   Ultrasound kidney with no hydronephrosis. Renal parenchymal disease. Possible chronic bladder outlet obstruction.     The patient was Admitted with MIESHA on CKD to progressing to ESRD, HTN emergency, fluid overload with acute respiratory failure with hypoxia, Anemia and hyperkalemia. Nephrology was consulted and followed the patient. Permacath was placed and the patient was started on Hemodialysis. Echocardiogram was done and revealed Diastolic CHF. The patient's fluid overload was managed with Hemodialysis and his acute hypoxia resolved. The patient was started on antihypertensives and HTN emergency resolved. The patient was transfused 2U pRBC and given Procrit for anemia with adequate increase Hemoglobin; that remained stable for remaining hospital course. The patient was found to have urinary retention and indwelling matos was placed. The patient is clinically stable and received a hemodialysis slot in the community.

## 2023-01-26 NOTE — DISCHARGE NOTE NURSING/CASE MANAGEMENT/SOCIAL WORK - PATIENT PORTAL LINK FT
You can access the FollowMyHealth Patient Portal offered by [facility] by registering at the following website: [email or web address]. By joining [facility]’s FollowMyHealth portal, you will also be able to view your health information using other applications (apps) compatible with our system.

## 2023-01-26 NOTE — DISCHARGE NOTE NURSING/CASE MANAGEMENT/SOCIAL WORK - NSDCPEPTEDCOPD_GEN_ALL_CORE
Protocol For Photochemotherapy For Severe Photoresponsive Dermatoses: Tar And Nbuvb (Goeckerman Treatment): The patient received Photochemotherapy for severe photoresponsive dermatoses: Tar and NBUVB (Goeckerman treatment) requiring at least 4 to 8 hours of care under direct physician supervision. Protocol For Bath Puva: The patient received Bath PUVA. Protocol For Photochemotherapy: Triamcinolone Ointment And Nbuvb: The patient received Photochemotherapy: Triamcinolone and NBUVB (triamcinolone ointment applied to all lesions prior to phototherapy). Render Post-Care In The Note: no Protocol For Photochemotherapy: Tar And Nbuvb (Goeckerman Treatment): The patient received Photochemotherapy: Tar and NBUVB (Goeckerman treatment). Protocol For Photochemotherapy: Tar And Broad Band Uvb (Goeckerman Treatment): The patient received Photochemotherapy: Tar and Broad Band UVB (Goeckerman treatment). Comments On Previous Treatment: Patient tolerated the last treatment well no complaints. Skin Type: IV Protocol For Nb Uva: The patient received NB UVA. Protocol For Nbuvb: The patient received NBUVB. Post-Care Instructions: I reviewed with the patient in detail post-care instructions. Patient is to wear sun protection. Patients may expect sunburn like redness, discomfort and scabbing. Protocol For Puva: The patient received PUVA. Protocol For Photochemotherapy: Mineral Oil And Nbuvb: The patient received Photochemotherapy: Mineral Oil and NBUVB (mineral oil applied to all lesions prior to phototherapy). Additional Shield Locations: Glasses Irradiance (Optional- Include Units): 4.17 Total Treatment Time: 15 Minutes Protocol For Photochemotherapy: Mineral Oil And Broad Band Uvb: The patient received Photochemotherapy: Mineral Oil and Broad Band UVB. Protocol: Photochemotherapy: Mineral Oil and NBUVB Protocol For Photochemotherapy For Severe Photoresponsive Dermatoses: Tar And Broad Band Uvb (Goeckerman Treatment): The patient received Photochemotherapy for severe photoresponsive dermatoses: Tar and Broad Band UVB (Goeckerman treatment) requiring at least 4 to 8 hours of care under direct physician supervision. Changes In Treatment Protocol: Skin type 4 will increase 45 mj as tolerated Protocol For Photochemotherapy For Severe Photoresponsive Dermatoses: Puva: The patient received Photochemotherapy for severe photoresponsive dermatoses: PUVA requiring at least 4 to 8 hours of care under direct physician supervision. Call 911 for increased or worsened COPD symptoms/Need for follow up after discharge/Prescribed medication/Risk factors for COPD/Support groups for patient/Signs and symptoms of COPD Name Of Supervising Technician: Derek De La Cruz Protocol For Uva: The patient received UVA. Detail Level: Zone Protocol For Broad Band Uvb: The patient received Broad Band UVB. Protocol For Photochemotherapy: Petrolatum And Broad Band Uvb: The patient received Photochemotherapy: Petrolatum and Broad Band UVB. Protocol For Uva1: The patient received UVA1. Consent: Written consent obtained. The risks were reviewed with the patient including but not limited to: burn, pigmentary changes, pain, blistering, scabbing, redness, increased risk of skin cancers, and the remote possibility of scarring. Protocol For Protocol For Photochemotherapy For Severe Photoresponsive Dermatoses: Bath Puva: The patient received Photochemotherapy for severe photoresponsive dermatoses: Bath PUVA requiring at least 4 to 8 hours of care under direct physician supervision. Protocol For Photochemotherapy: Baby Oil And Nbuvb: The patient received Photochemotherapy: Baby Oil and NBUVB (baby oil applied to all lesions prior to phototherapy). Protocol For Photochemotherapy: Petrolatum And Nbuvb: The patient received Photochemotherapy: Petrolatum and NBUVB (petrolatum applied to all lesions prior to phototherapy). Protocol For Photochemotherapy For Severe Photoresponsive Dermatoses: Petrolatum And Nbuvb: The patient received Photochemotherapy for severe photoresponsive dermatoses: Petrolatum and NBUVB requiring at least 4 to 8 hours of care under direct physician supervision. Total Body Energy: 1320  MJ held dose per patient request Treatment Number: 200 [facility] Total Body Time: 5:17 Protocol For Photochemotherapy For Severe Photoresponsive Dermatoses: Petrolatum And Broad Band Uvb: The patient received Photochemotherapyfor severe photoresponsive dermatoses: Petrolatum and Broad Band UVB requiring at least 4 to 8 hours of care under direct physician supervision.

## 2023-01-26 NOTE — DISCHARGE NOTE PROVIDER - CARE PROVIDERS DIRECT ADDRESSES
[email or web address] ,[email or web address] ,DirectAddress_Unknown

## 2023-01-26 NOTE — DISCHARGE NOTE PROVIDER - CARE PROVIDER_API CALL
Tang Cano)  Internal Medicine; Nephrology  [address] [facility], [address] [place] [id number]  Phone: [phone]  Fax: [phone]  Follow Up Time:    Tang Cano)  Internal Medicine; Nephrology  [address], [address]  [place], [place] [id number]  Phone: [phone]  Fax: [phone]  Follow Up Time:     Matteo Russ)  Urology  [address] [facility], [address] [place] [address]  Phone: [phone]  Fax: [phone]  Follow Up Time:    Dr. Josh Kerr  [address] [facility] [address]  Phone: [phone]  Fax: (   )    -  Follow Up Time:

## 2023-01-26 NOTE — DISCHARGE NOTE PROVIDER - NSDCMRMEDTOKEN_GEN_ALL_CORE_FT
atenolol 25 mg oral tablet: 1 tab(s) orally once a day  furosemide 20 mg oral tablet: 2 tab(s) orally once a day  hydrALAZINE 50 mg oral tablet: 1 tab(s) orally 3 times a day  potassium chloride 10 mEq oral capsule, extended release: 1 cap(s) orally 2 times a day   amLODIPine 10 mg oral tablet: 1 tab(s) orally once a day  atenolol 25 mg oral tablet: 1 tab(s) orally once a day  doxazosin 2 mg oral tablet: 1 tab(s) orally once a day (at bedtime)  ferrous sulfate 325 mg (65 mg elemental iron) oral tablet: 1 tab(s) orally once a day  furosemide 20 mg oral tablet: 2 tab(s) orally once a day  hydrALAZINE 100 mg oral tablet: 1 tab(s) orally every 8 hours  senna leaf extract oral tablet: 2 tab(s) orally once a day (at bedtime)  simvastatin 20 mg oral tablet: 1 tab(s) orally once a day (at bedtime)

## 2023-01-26 NOTE — DISCHARGE NOTE NURSING/CASE MANAGEMENT/SOCIAL WORK - NSDCFUADDAPPT_GEN_ALL_CORE_FT
Pt has an appointment on 1/28 at 10:30 AM for first HD treatment  Pt's ongoing schedule beginning 1/30 will be M/W/F at 10AM  Pt will be seeing Dr Josh Kerr

## 2023-01-26 NOTE — DISCHARGE NOTE PROVIDER - NSDCCPCAREPLAN_GEN_ALL_CORE_FT
PRINCIPAL DISCHARGE DIAGNOSIS  Diagnosis: ESRD needing dialysis  Assessment and Plan of Treatment: Associated volume overload, Acute Respiratory failure with hypoxia, Anemia of chronic renal disease, and hyperkalemia POA; resolved    HD initiated 1/20/2023  Hyperkalemia: resolved w/ HD   continue outpatient Hemodialysis as scheduled   follow up with Nephrology Dr. Cano      SECONDARY DISCHARGE DIAGNOSES  Diagnosis: Acute CHF  Assessment and Plan of Treatment: Acute on Chronic Diastolic CHF with Acute Respiratory failure Hypoxia  Echo: Ejection fraction preserved, Grade II Diastolic dysfuction, LVH   continue with Hemodialysis and Lasix    Diagnosis: Accelerated hypertension  Assessment and Plan of Treatment: HTN emergency Present on admission   Continue amlodipine, hydralazine, doxazosin and atenolol  check blood pressure daily and write in log book   follow up with your primary Doctor with log book for management    Diagnosis: Anemia of chronic renal failure, stage 5  Assessment and Plan of Treatment: Iron deficiency Anemia   Status post 2 units PRBC transfusion during Hospital course    Status post Procrit with HD   continue with ferrous sulfate   Follow up with your Primary and Nephrology for hemoglobin monitoring    Diagnosis: Urinary retention  Assessment and Plan of Treatment: Continue with Indwelling matos catheter and  Doxazosin.  follow up with Urology with Dr. Russ     PRINCIPAL DISCHARGE DIAGNOSIS  Diagnosis: ESRD needing dialysis  Assessment and Plan of Treatment: Associated volume overload, Acute Respiratory failure with hypoxia, Anemia of chronic renal disease, and hyperkalemia POA; resolved    HD initiated 1/20/2023  Hyperkalemia: resolved w/ HD   continue outpatient Hemodialysis as scheduled   follow up with Nephrology Dr. Cano      SECONDARY DISCHARGE DIAGNOSES  Diagnosis: Acute CHF  Assessment and Plan of Treatment: Acute on Chronic Diastolic CHF with Acute Respiratory failure Hypoxia  Echo: Ejection fraction preserved, Grade II Diastolic dysfuction, LVH   continue with Hemodialysis and Lasix 40mg daily    Diagnosis: Accelerated hypertension  Assessment and Plan of Treatment: HTN emergency Present on admission   Continue amlodipine, hydralazine, doxazosin and atenolol  check blood pressure daily and write in log book   follow up with your primary Doctor with log book for management    Diagnosis: Anemia of chronic renal failure, stage 5  Assessment and Plan of Treatment: Iron deficiency Anemia   Status post 2 units PRBC transfusion during Hospital course    Status post Procrit with HD   continue with ferrous sulfate   Follow up with your Primary and Nephrology for hemoglobin monitoring    Diagnosis: History of BPH  Assessment and Plan of Treatment: continue with Doxazosin

## 2023-01-26 NOTE — DISCHARGE NOTE PROVIDER - PROVIDER TOKENS
PROVIDER:[TOKEN:[[id number]]] PROVIDER:[TOKEN:[[id number]]],PROVIDER:[TOKEN:[[id number]]] FREE:[LAST:[Cirilo],FIRST:[Dr. Moreno],PHONE:[phone]],FAX:[(   )    -],ADDRESS:[[address] [address]]]

## 2023-01-31 DIAGNOSIS — E87.5 HYPERKALEMIA: ICD-10-CM

## 2023-01-31 DIAGNOSIS — N17.9 ACUTE KIDNEY FAILURE, UNSPECIFIED: ICD-10-CM

## 2023-01-31 DIAGNOSIS — E78.5 HYPERLIPIDEMIA, UNSPECIFIED: ICD-10-CM

## 2023-01-31 DIAGNOSIS — Z79.899 OTHER LONG TERM (CURRENT) DRUG THERAPY: ICD-10-CM

## 2023-01-31 DIAGNOSIS — I50.33 ACUTE ON CHRONIC DIASTOLIC (CONGESTIVE) HEART FAILURE: ICD-10-CM

## 2023-01-31 DIAGNOSIS — I13.2 HYPERTENSIVE HEART AND CHRONIC KIDNEY DISEASE WITH HEART FAILURE AND WITH STAGE 5 CHRONIC KIDNEY DISEASE, OR END STAGE RENAL DISEASE: ICD-10-CM

## 2023-01-31 DIAGNOSIS — E87.70 FLUID OVERLOAD, UNSPECIFIED: ICD-10-CM

## 2023-01-31 DIAGNOSIS — E11.22 TYPE 2 DIABETES MELLITUS WITH DIABETIC CHRONIC KIDNEY DISEASE: ICD-10-CM

## 2023-01-31 DIAGNOSIS — N18.6 END STAGE RENAL DISEASE: ICD-10-CM

## 2023-01-31 DIAGNOSIS — I16.1 HYPERTENSIVE EMERGENCY: ICD-10-CM

## 2023-01-31 DIAGNOSIS — J96.01 ACUTE RESPIRATORY FAILURE WITH HYPOXIA: ICD-10-CM

## 2023-01-31 DIAGNOSIS — D63.1 ANEMIA IN CHRONIC KIDNEY DISEASE: ICD-10-CM

## 2023-01-31 DIAGNOSIS — R33.9 RETENTION OF URINE, UNSPECIFIED: ICD-10-CM

## 2023-01-31 DIAGNOSIS — E11.21 TYPE 2 DIABETES MELLITUS WITH DIABETIC NEPHROPATHY: ICD-10-CM

## 2023-02-03 PROBLEM — Z00.00 ENCOUNTER FOR PREVENTIVE HEALTH EXAMINATION: Status: ACTIVE | Noted: 2023-02-03

## 2023-02-04 ENCOUNTER — TRANSCRIPTION ENCOUNTER (OUTPATIENT)
Age: 68
End: 2023-02-04

## 2023-02-09 ENCOUNTER — APPOINTMENT (OUTPATIENT)
Dept: CARE COORDINATION | Facility: HOME HEALTH | Age: 68
End: 2023-02-09

## 2023-02-10 ENCOUNTER — TRANSCRIPTION ENCOUNTER (OUTPATIENT)
Age: 68
End: 2023-02-10

## 2023-02-13 NOTE — PHYSICAL THERAPY INITIAL EVALUATION ADULT - RANGE OF MOTION EXAMINATION, REHAB EVAL
Ice, continue and complete antibiotics, if any redness or swelling be reevaluated. The best treatment for minor injuries is R.I.C.E. and NSAID medications. R.I.C.E. = Rest  Ice  Compression  Elevation      Rest: Do not use the injured body part unnecessarily. If this is a lower extremity, do not take long walks, run or do anything that causes increased pain. Gradually progress to your normal activity, using pain as your guide. Ice: Apply cold compresses to the injured area. The area that is injured is inflammed. Inflammation causes warmth, so ice may give some relief. You may ice through a brace or ace wrap. Compression: Compression to the area will help control swelling. An ace wrap is the most simple form of compression. You can also wear a brace. Elevation: Raise the injured extremity above heart level. This will reduce throbbing pain and swelling associated with injures. Prop the injured extremity up with pillows while lying down. bilateral upper extremity ROM was WFL (within functional limits)/bilateral lower extremity ROM was WFL (within functional limits)

## 2023-02-14 ENCOUNTER — APPOINTMENT (OUTPATIENT)
Dept: CARE COORDINATION | Facility: HOME HEALTH | Age: 68
End: 2023-02-14
Payer: MEDICARE

## 2023-02-14 VITALS
WEIGHT: 200.62 LBS | HEIGHT: 69 IN | SYSTOLIC BLOOD PRESSURE: 160 MMHG | RESPIRATION RATE: 18 BRPM | HEART RATE: 92 BPM | OXYGEN SATURATION: 96 % | BODY MASS INDEX: 29.71 KG/M2 | TEMPERATURE: 98.8 F | DIASTOLIC BLOOD PRESSURE: 60 MMHG

## 2023-02-14 DIAGNOSIS — D64.9 ANEMIA, UNSPECIFIED: ICD-10-CM

## 2023-02-14 DIAGNOSIS — Z78.9 OTHER SPECIFIED HEALTH STATUS: ICD-10-CM

## 2023-02-14 DIAGNOSIS — E11.319 TYPE 2 DIABETES MELLITUS WITH UNSPECIFIED DIABETIC RETINOPATHY W/OUT MACULAR EDEMA: ICD-10-CM

## 2023-02-14 DIAGNOSIS — R52 PAIN, UNSPECIFIED: ICD-10-CM

## 2023-02-14 DIAGNOSIS — K59.00 CONSTIPATION, UNSPECIFIED: ICD-10-CM

## 2023-02-14 DIAGNOSIS — E11.40 TYPE 2 DIABETES MELLITUS WITH DIABETIC NEUROPATHY, UNSPECIFIED: ICD-10-CM

## 2023-02-14 PROCEDURE — 99495 TRANSJ CARE MGMT MOD F2F 14D: CPT

## 2023-02-14 RX ORDER — FUROSEMIDE 20 MG/1
20 TABLET ORAL DAILY
Refills: 0 | Status: ACTIVE | COMMUNITY
Start: 2023-02-14

## 2023-02-14 RX ORDER — DOXAZOSIN 2 MG/1
2 TABLET ORAL AT BEDTIME
Refills: 0 | Status: ACTIVE | COMMUNITY
Start: 2023-02-14

## 2023-02-14 RX ORDER — SIMVASTATIN 20 MG/1
20 TABLET, FILM COATED ORAL
Qty: 30 | Refills: 5 | Status: ACTIVE | COMMUNITY
Start: 2023-02-14

## 2023-02-14 RX ORDER — AMLODIPINE BESYLATE 10 MG/1
10 TABLET ORAL
Refills: 0 | Status: ACTIVE | COMMUNITY
Start: 2023-02-14

## 2023-02-14 RX ORDER — SENNOSIDES 8.6 MG TABLETS 8.6 MG/1
8.6 TABLET ORAL
Qty: 60 | Refills: 1 | Status: ACTIVE | COMMUNITY
Start: 2023-02-14

## 2023-02-14 RX ORDER — ATENOLOL 25 MG/1
25 TABLET ORAL DAILY
Refills: 0 | Status: ACTIVE | COMMUNITY
Start: 2023-02-14

## 2023-02-14 RX ORDER — HYDRALAZINE HYDROCHLORIDE 100 MG/1
100 TABLET ORAL 3 TIMES DAILY
Refills: 0 | Status: ACTIVE | COMMUNITY
Start: 2023-02-14

## 2023-02-14 RX ORDER — ACETAMINOPHEN 500 MG/1
500 CAPSULE, LIQUID FILLED ORAL
Refills: 0 | Status: ACTIVE | COMMUNITY
Start: 2023-02-14

## 2023-02-14 NOTE — PLAN
[FreeTextEntry1] : 1. continue all medications as prescribed\par 2. keep HD appts MWF\par 3. F/U with OPH monthly for treatment for diabetic retinopathy\par 4. maintain Renal, DASH, low salt diet\par 5. maintain fluid restrictions\par

## 2023-02-14 NOTE — HISTORY OF PRESENT ILLNESS
[Post-hospitalization from ___ Hospital] : Post-hospitalization from [unfilled] Hospital [Admitted on: ___] : The patient was admitted on [unfilled] [Discharged on ___] : discharged on [unfilled] [Discharge Summary] : discharge summary [Discharge Med List] : discharge medication list [Other: ____] : [unfilled] [Med Reconciliation] : medication reconciliation has been completed [Patient Contacted By: ____] : and contacted by [unfilled] [FreeTextEntry2] : 67 years old male with h/o HTN, HLD, CKD present to ED with complain of worsening LE edema, SOB and abnormal labs results with elevated Cr. Patient was seen by PCP, just started on Lasix 20 mg 4 days ago,  informed to come to ED \par on Friday but patient unable to come to ED due to some issue at home. Reported progressive SOB on exertion with orthopnea and PND. No chest pain. Hypertensive to 214/99, afebrile, sat well at RA. No leukocytosis, \par Hb 7.5, plt 168, Na 146, K 6, Cr 11.9, proBNP 48413. Started on HD via PermCath, transfused with 2 U PRBC, Procrit and antihypertensives. D/c home with home care and HD to continue. \par Explained the role of TCM in his care, yellow card with contact let with pt for any questions. \par \par Home visit made to pt and his dgtr Debra. Pt is alert and oriented x 4. Pt has PermaCath to Rt upper chest wall. Site is clean and intact. Pt had dialysis yesterday, tolerated well.  Pt lives with family/alone with 24/7 aide.  Ambulation status,  Bed/chair bound. Denies CP, SOB, vision changes,nausea, vomiting, diarrhea, falls, edema. Reports appetite is intact, sleep disturbed most nights. Skin is intact.  Med rec done:  pt/caregiver reports they have all medications.  However, was not taking the Lasix. Gets constipated taking the Ferrous sulfate, has Senna,  remind pt to take it at bedtime as prescribed. Has not been taking the Atenolol or the Furosemide, /60 instructed him on the role elevated BP play in his overall health - headaches, blurred vision, easily angry. States he will start taking them. To follow up with his PCP for any changes.  \par Educated pt on Renal, diabetic Dash diet. States he was told his protein was low and was given a supplement. Instructed him to monitor salt intake and maintain fluid restrictions. he was able to states 4 cups of fluids, encouraged ice chips and cold fruits or dry mouth. Also Nephro supplements as recommended during the hospital stay. \par What matters most to pt is that his kidney function will improve and he will get off of dialysis. No Goals of care. Remains full code.

## 2023-02-14 NOTE — CURRENT MEDS
[Takes medication as prescribed] : takes [None] : Patient does not have any barriers to medication adherence [Yes] : Reviewed medication list for presence of high-risk medications. [FreeTextEntry1] : pt missed taking Furosemide and Atenolol - discussed and showed pt the hospital d/c papers he agreed to start taking them as well as the Senna.

## 2023-02-14 NOTE — HEALTH RISK ASSESSMENT
[Diabetic Diet] : diabetic [Low Fat Diet] : low fat [Low Salt Diet] : low salt [General Adherence] : general adherence [FreeTextEntry1] : Renal diet [FreeTextEntry4] : states he has not thought about goals of care. What matters most to pt is that his kidney function will improve and he will get off of dialysis.

## 2023-02-14 NOTE — PHYSICAL EXAM
[No Acute Distress] : no acute distress [Well Nourished] : well nourished [Normal Sclera/Conjunctiva] : normal sclera/conjunctiva [Normal Outer Ear/Nose] : the outer ears and nose were normal in appearance [No JVD] : no jugular venous distention [No Respiratory Distress] : no respiratory distress  [Clear to Auscultation] : lungs were clear to auscultation bilaterally [No Accessory Muscle Use] : no accessory muscle use [Soft] : abdomen soft [Non-distended] : non-distended [Normal Bowel Sounds] : normal bowel sounds [No CVA Tenderness] : no CVA  tenderness [No Spinal Tenderness] : no spinal tenderness [No Rash] : no rash [No Skin Lesions] : no skin lesions [Normal Gait] : normal gait [Speech Grossly Normal] : speech grossly normal [Memory Grossly Normal] : memory grossly normal [Normal Affect] : the affect was normal [Alert and Oriented x3] : oriented to person, place, and time [Normal Mood] : the mood was normal [de-identified] : appears sad and upset - enquired he denies feeling sad

## 2023-02-14 NOTE — REVIEW OF SYSTEMS
[Vision Problems] : vision problems [Lower Ext Edema] : lower extremity edema [Constipation] : constipation [Joint Pain] : joint pain [Muscle Pain] : muscle pain [Muscle Weakness] : muscle weakness [Unsteady Walk] : ataxia [Insomnia] : insomnia [Negative] : Heme/Lymph [Fever] : no fever [Chills] : no chills [Fatigue] : no fatigue [Hearing Loss] : no hearing loss [Chest Pain] : no chest pain [Palpitations] : no palpitations [Shortness Of Breath] : no shortness of breath [Wheezing] : no wheezing [Cough] : no cough [Abdominal Pain] : no abdominal pain [Dysuria] : no dysuria [Incontinence] : no incontinence [Hesitancy] : no hesitancy [Headache] : no headache [Dizziness] : no dizziness [Suicidal] : not suicidal [FreeTextEntry3] : diabetic retinopathy [FreeTextEntry5] : trace edema dorsum

## 2023-02-17 ENCOUNTER — TRANSCRIPTION ENCOUNTER (OUTPATIENT)
Age: 68
End: 2023-02-17

## 2023-02-24 ENCOUNTER — TRANSCRIPTION ENCOUNTER (OUTPATIENT)
Age: 68
End: 2023-02-24

## 2023-03-15 NOTE — PATIENT PROFILE ADULT - FUNCTIONAL ASSESSMENT - BASIC MOBILITY SCORE.
[facility] Patient Status:  Hospital Outpatient Surgery   Age/Gender 73year old female MRN [id number]   [facility] SURGERY Attending Morenita Yoder MD   [address] Day [address] 0 PCP Jalen Reaves MD       Anesthesia Post-op Note    RIGHT TOTAL KNEE ARTHROPLASTY    Procedure Summary     Date: 03/15/23 Room / Location: [facility] MAIN OR 14 / [facility] OR    Anesthesia Start: 0703 Anesthesia Stop: 2458    Procedure: RIGHT TOTAL KNEE ARTHROPLASTY (Right: Knee) Diagnosis: (PRIMARY OSTEOARTHRITIS OF RIGHT KNEE)    Surgeons: Morenita Yoder MD Anesthesiologist: Gisel Daniels MD    Anesthesia Type: spinal ASA Status: 2          Anesthesia Type: spinal    Vitals Value Taken Time   BP 94/58 03/15/23 0821   Temp 97.9 03/15/23 0821   Pulse 84 03/15/23 0821   Resp 18 03/15/23 0821   SpO2 100 03/15/23 0821       Patient Location: PACU    Anesthesia Type: spinal    Airway Patency: patent    Postop Pain Control: adequate    Mental Status: preanesthetic baseline    Nausea/Vomiting: none    Cardiopulmonary/Hydration status: stable euvolemic    Complications: no apparent anesthesia related complications    Postop vital signs: stable    Dental Exam: Unchanged from Preop    Patient to be discharged from PACU when criteria met.
24

## 2023-05-30 ENCOUNTER — APPOINTMENT (OUTPATIENT)
Dept: TRANSPLANT | Facility: CLINIC | Age: 68
End: 2023-05-30
Payer: COMMERCIAL

## 2023-05-30 ENCOUNTER — APPOINTMENT (OUTPATIENT)
Dept: NEPHROLOGY | Facility: CLINIC | Age: 68
End: 2023-05-30
Payer: COMMERCIAL

## 2023-05-30 ENCOUNTER — LABORATORY RESULT (OUTPATIENT)
Age: 68
End: 2023-05-30

## 2023-05-30 ENCOUNTER — NON-APPOINTMENT (OUTPATIENT)
Age: 68
End: 2023-05-30

## 2023-05-30 VITALS
SYSTOLIC BLOOD PRESSURE: 153 MMHG | OXYGEN SATURATION: 100 % | RESPIRATION RATE: 14 BRPM | WEIGHT: 198 LBS | HEART RATE: 84 BPM | TEMPERATURE: 98.8 F | BODY MASS INDEX: 29.24 KG/M2 | DIASTOLIC BLOOD PRESSURE: 71 MMHG

## 2023-05-30 VITALS
DIASTOLIC BLOOD PRESSURE: 71 MMHG | OXYGEN SATURATION: 100 % | SYSTOLIC BLOOD PRESSURE: 153 MMHG | HEART RATE: 84 BPM | BODY MASS INDEX: 29.33 KG/M2 | HEIGHT: 69 IN | WEIGHT: 198 LBS | RESPIRATION RATE: 18 BRPM

## 2023-05-30 PROCEDURE — 99205 OFFICE O/P NEW HI 60 MIN: CPT

## 2023-05-30 NOTE — HISTORY OF PRESENT ILLNESS
[FreeTextEntry1] : 67  years old  male, born in [place], [place] , living in  for over 30 years\par Patient has known CKD (), ESRD (2023)  on follow up with Dr. Kerr is here for pre kidney transplant evaluation. \par He has known DM (age 46) On Metformin previously ; HTN (age 51). No h/o Hyperlipidemia / Gout\par No known h/o kidney stone/ Prostatism.\par No hematuria/Transfusions\par Urine out put: small quantity\par No h/o Sleep apnea. No h/o Thyroid disease.\par Does not take any Coumadin/eliquis/Plavix/Brilinta. No known h/o tuberculosis or hepatitis.\par No h/o NSAID/pain medication use.\par No major weight loss/weight loss surgeries\par Has no h/o Pneumonia / UTI.\par No known h/o active CAD/CVA/PVD/DVT/neoplasia/active infections/bleeding/open wounds/falls/seizures/psych issues/ COVID.\par COVID vaccination:Yes\par Reports no major allergies. \par Most recent hospitalization/for:2023 for starting dilaysis\par Past surgeries:\par Left forearm AVF\par No history of kidney/ bladder/ prostate surgery.\par Non smoker.\par Family: \par Lives with:Alone\par \par Parents are . Father -unsure Mother- DM Siblings- healthy.\par Children: ten, Healthy. \par No family history of kidney disease\par Independent for ADL\par Able to walk ten  blocks, can climb stairs with difficulty.\par Assist devices\par Driving:none\par ROS: Has h/o shortness of breath on exertion. \par Vision:Ok\par Hearing:Ok\par Functional/employment status:[employer]\par \par Dialysis history:[place]\par Kidney Biopsy:None\par Potential Live donors:Daughter\par \par Prior Studies:\par Cardiology:None\par Cancer Screen:None\par  \par Primary MD:Linda Bowens MD\par Nephrologist:Dr. Kerr\par \par Prior Transplants:None\par Prior Transplant evaluation:None\par Allergies:NKDA\par Medications:\par Doxazosin 2 mg/d\par Amlodipine 10/d\par Hydralazine 100 X 3/d\par Furosemide 40 mg/d\par

## 2023-05-30 NOTE — PHYSICAL EXAM
[General Appearance - Alert] : alert [General Appearance - In No Acute Distress] : in no acute distress [Sclera] : the sclera and conjunctiva were normal [PERRL With Normal Accommodation] : pupils were equal in size, round, and reactive to light [Extraocular Movements] : extraocular movements were intact [Outer Ear] : the ears and nose were normal in appearance [Oropharynx] : the oropharynx was normal [Neck Appearance] : the appearance of the neck was normal [Neck Cervical Mass (___cm)] : no neck mass was observed [Jugular Venous Distention Increased] : there was no jugular-venous distention [Thyroid Diffuse Enlargement] : the thyroid was not enlarged [Thyroid Nodule] : there were no palpable thyroid nodules [Auscultation Breath Sounds / Voice Sounds] : lungs were clear to auscultation bilaterally [Heart Sounds Gallop] : no gallops [Heart Sounds Pericardial Friction Rub] : no pericardial rub [Full Pulse] : the pedal pulses are present [Edema] : there was no peripheral edema [Bowel Sounds] : normal bowel sounds [Abdomen Soft] : soft [Abdomen Tenderness] : non-tender [Abdomen Mass (___ Cm)] : no abdominal mass palpated [Cervical Lymph Nodes Enlarged Posterior Bilaterally] : posterior cervical [Cervical Lymph Nodes Enlarged Anterior Bilaterally] : anterior cervical [___ (cm) Fistula] : [unfilled] (cm) fistula [Bruit] : a bruit was present [Thrill] : a thrill was present [] : no rash [No Focal Deficits] : no focal deficits [Oriented To Time, Place, And Person] : oriented to person, place, and time [Impaired Insight] : insight and judgment were intact [Affect] : the affect was normal

## 2023-05-30 NOTE — ASSESSMENT
[FreeTextEntry1] : .Mr. RAMIRES 67 year He is evaluated for kidney transplantation.\par Pre transplant/ESRD: Patient will benefit from renal allotransplantation he is an acceptable/ moderate risk candidate, diabetes, HTN\par Medical risks: Cardiovascular, cancer screening. Renasight\par Diabetes Mellitus:Diet control  Discussed implications. Continue follow up with primary physicians\par Hypertension: Discussed implications. Continue follow up with primary physicians.\par Cardiac risk:  will get further evaluation; echo, stress test; Reviewed cardiovascular risk evaluation process\par Cancer screening: PSA .  Colon dixon screening. Reviewed for any h/o neoplastic diseases\par ID: Serology for acute and chronic viral infections/screening for latent TB \par Imaging: Renal/abdominal /chest /Iliac/ carotids imaging/ Aneurysm screening\par Consults: Nutrition, social work, cardiology, Transplant surgery.\par Reviewed factors affecting survival and morbidity while on wait list and reviewed lalo-operative and long-term risk factors affecting outcome in kidney transplantation.\par Details of transplant surgery, immunosuppression and its complications and benefits of live donor transplantation as well as variability in wait times across regions and multiple listing were discussed. KDPI >85% and PHS high risk criteria donors were discussed. Discussed factors affecting morbidity and mortality while on hemodialysis.\par Current outcome for [facility] kidney transplant program and SRTR data were discussed. He has informative educational video and power point presentation regarding details of kidney transplantation at this center.\par Patient has potential live donor (daughter, hayley ) at present. \par Will proceed with completing/ updating work up and listing for transplant/ live donor transplant once work up is reviewed and found to be ok.\par Copy of Consult/Note sent to referring nephrologist/Primary provider.\par

## 2023-06-01 ENCOUNTER — NON-APPOINTMENT (OUTPATIENT)
Age: 68
End: 2023-06-01

## 2023-06-01 LAB
ABO + RH PNL BLD: NORMAL
ABO + RH PNL BLD: NORMAL
ALBUMIN SERPL ELPH-MCNC: 4.8 G/DL
ALP BLD-CCNC: 126 U/L
ALT SERPL-CCNC: 15 U/L
ANION GAP SERPL CALC-SCNC: 17 MMOL/L
APPEARANCE: CLEAR
AST SERPL-CCNC: 19 U/L
BACTERIA: NEGATIVE /HPF
BILIRUB SERPL-MCNC: 0.4 MG/DL
BILIRUBIN URINE: NEGATIVE
BLOOD URINE: NEGATIVE
BUN SERPL-MCNC: 64 MG/DL
C PEPTIDE SERPL-MCNC: 4.8 NG/ML
CALCIUM SERPL-MCNC: 9.6 MG/DL
CAST: 0 /LPF
CHLORIDE SERPL-SCNC: 89 MMOL/L
CHOLEST SERPL-MCNC: 159 MG/DL
CMV IGG SERPL QL: 5.4 U/ML
CMV IGG SERPL-IMP: POSITIVE
CO2 SERPL-SCNC: 27 MMOL/L
COLOR: YELLOW
COVID-19 SPIKE DOMAIN ANTIBODY INTERPRETATION: POSITIVE
CREAT SERPL-MCNC: 10.85 MG/DL
CREAT SPEC-SCNC: 98 MG/DL
CREAT/PROT UR: 1.9 RATIO
EBV DNA SERPL NAA+PROBE-ACNC: NOT DETECTED IU/ML
EBV EA AB SER IA-ACNC: <5 U/ML
EBV EA AB TITR SER IF: POSITIVE
EBV EA IGG SER QL IA: >600 U/ML
EBV EA IGG SER-ACNC: NEGATIVE
EBV EA IGM SER IA-ACNC: NEGATIVE
EBV PATRN SPEC IB-IMP: NORMAL
EBV VCA IGG SER IA-ACNC: 287 U/ML
EBV VCA IGM SER QL IA: <10 U/ML
EBVPCR LOG: NOT DETECTED LOG10IU/ML
EGFR: 5 ML/MIN/1.73M2
EPITHELIAL CELLS: 0 /HPF
EPSTEIN-BARR VIRUS CAPSID ANTIGEN IGG: POSITIVE
ESTIMATED AVERAGE GLUCOSE: 117 MG/DL
GLUCOSE QUALITATIVE U: NEGATIVE MG/DL
GLUCOSE SERPL-MCNC: 121 MG/DL
HAV IGM SER QL: NONREACTIVE
HBA1C MFR BLD HPLC: 5.7 %
HBV CORE IGG+IGM SER QL: REACTIVE
HBV SURFACE AB SER QL: REACTIVE
HBV SURFACE AB SERPL IA-ACNC: 13 MIU/ML
HBV SURFACE AG SER QL: NONREACTIVE
HCV AB SER QL: NONREACTIVE
HCV S/CO RATIO: 0.19 S/CO
HDLC SERPL-MCNC: 51 MG/DL
HIV1+2 AB SPEC QL IA.RAPID: NONREACTIVE
HSV 1+2 IGG SER IA-IMP: POSITIVE
HSV 1+2 IGG SER IA-IMP: POSITIVE
HSV1 IGG SER QL: 34 INDEX
HSV2 IGG SER QL: >23.6 INDEX
KETONES URINE: NEGATIVE MG/DL
LDLC SERPL CALC-MCNC: 90 MG/DL
LEUKOCYTE ESTERASE URINE: NEGATIVE
M TB IFN-G BLD-IMP: NEGATIVE
MAGNESIUM SERPL-MCNC: 2.4 MG/DL
MICROSCOPIC-UA: NORMAL
NITRITE URINE: NEGATIVE
NONHDLC SERPL-MCNC: 108 MG/DL
PH URINE: 7
PHOSPHATE SERPL-MCNC: 6 MG/DL
POTASSIUM SERPL-SCNC: 5.7 MMOL/L
PROT SERPL-MCNC: 8.8 G/DL
PROT UR-MCNC: 184 MG/DL
PROTEIN URINE: 300 MG/DL
PSA SERPL-MCNC: 2.27 NG/ML
QUANTIFERON TB PLUS MITOGEN MINUS NIL: 1.12 IU/ML
QUANTIFERON TB PLUS NIL: 0.18 IU/ML
QUANTIFERON TB PLUS TB1 MINUS NIL: -0.02 IU/ML
QUANTIFERON TB PLUS TB2 MINUS NIL: -0.02 IU/ML
RED BLOOD CELLS URINE: 0 /HPF
ROGOSIN: NORMAL
RUBV IGG FLD-ACNC: 23.6 INDEX
RUBV IGG SER-IMP: POSITIVE
SARS-COV-2 AB SERPL IA-ACNC: >250 U/ML
SODIUM SERPL-SCNC: 134 MMOL/L
SPECIFIC GRAVITY URINE: 1.01
T GONDII AB SER-IMP: NEGATIVE
T GONDII IGG SER QL: <3 IU/ML
T PALLIDUM AB SER QL IA: NEGATIVE
TRIGL SERPL-MCNC: 91 MG/DL
URATE SERPL-MCNC: 6.4 MG/DL
UROBILINOGEN URINE: 0.2 MG/DL
VZV AB TITR SER: NEGATIVE
VZV IGG SER IF-ACNC: 71 INDEX
WHITE BLOOD CELLS URINE: 0 /HPF

## 2023-06-05 NOTE — REASON FOR VISIT
[Initial] : an initial visit for [End-Stage Renal Disease] : end-stage renal disease [FreeTextEntry2] : Josh Kerr MD

## 2023-06-05 NOTE — PHYSICAL EXAM
[Well Developed] : well developed [Well Nourished] : well nourished [No Acute Distress] : no acute distress [Normocephalic] : normocephalic [Atraumatic] : atraumatic [Sclera Anicteric] : sclera anicteric [Good Dentition] : good dentition [Neck Supple] : neck supple [Clear to Auscultation] : clear to auscultation [Breathing Comfortably on RA] : breathing comfortably on room air [Normal Rate] : normal rate [Regular Rhythm] : regular rhythm [Soft] : soft [Non-tender] : non-tender [In Left Forearm] : fistula/graft in left forearm [] : right dorsalis pedis palpable [Alert] : alert [Responds to Questions Appropriately] : responds to questions appropriately [Oriented] : oriented [Appropriate] : appropriate [TextBox_34] : No ulcers or edema. [TextBox_86] : No adenopathy

## 2023-06-05 NOTE — REVIEW OF SYSTEMS
[Sclera anicteric] : sclera anicteric [Blurred Vision] : blurred vision [Wears glasses] : wears glasses [Can Walk (___ Blocks)] : can walk [unfilled] blocks [Urine Output: ____] : Urine Output: [unfilled] [Joint Pain] : joint pain [Muscle Pain] : muscle pain [Itching] : itching [Anemia] : anemia [Fever] : no fever [Chills] : no chills [Fatigue] : no fatigue [Night Sweats] : no night sweats [Recent Weight Gain (___ Lbs)] : no recent weight gain [Recent Weight Loss (___ Lbs)] : no recent weight loss [Sore throat] : no sore throat [Pain/Stiffness] : no pain/stiffness [Rhinorrhea] : no rhinorrhea [Trauma] : no trauma [Double vision] : no double vision [Eye Pain] : no eye pain [Chest Pain] : no chest pain [Palpitations] : no palpitations [SOB] : no shortness of breath [Wheezing] : no wheezing [Cough] : no cough [Dyspnea on Exertion] : no dyspnea on exertion [Pleuritic Chest Pain] : no pleuritic chest pain [Abdominal Pain] : no abdominal pain [Nausea] : no nausea [Constipation] : no constipation [Diarrhea] : diarrhea [Vomiting] : no vomiting [Dysuria] : no dysuria [Frequency] : no frequency [Urgency] : no urinary urgency [Incontinence] : no incontinence [Hematuria] : no hematuria [UTI] : no UTI [Joint Stiffness] : no joint stiffness [Muscle Weakness] : no muscle weakness [Tattoos] : no tattoos [Skin Rash] : no skin rash [Hair Changes] : no hair changes [Headache] : no headache [Dizziness] : no dizziness [Fainting] : no fainting [Confusion] : no confusion [Memory Loss] : no memory loss [Unsteady Gait] : steady gait [Seizures] : no seizures [Suicidal] : not suicidal [Hallucinations] : no hallucinations [Anxiety] : no anxiety [Depression] : no depression [Adenopathy] : no adenopathy [Easy Bleeding] : no easy bleeding [Easy Bruising] : no easy bruising

## 2023-06-05 NOTE — ASSESSMENT
[Good candidate] : a good candidate. We should proceed with our protocol for evaluation for kidney transplantation. [FreeTextEntry1] : DATA:\par I reviewed/recommended the following tests:\par 1-	CBC.  Hematocrit 40.1% (05-)\par 2-	CMP.  Creatinine 10.85.  GFR 5 (05-)\par 3-	CMV +  (05-)\par 4-	EBV  +  (05-)\par 5-	ABO blood type O  (05-)\par 6-	CT scan of abdomen (requested)\par 7-	CT scan of pelvis (requested)\par 8-	Cardiac Stress Test (requested)\par 9-	Cardiac Echocardiogram (requested)\par 10-	Cardiac Consultation (requested with Dr. Josh Pearce)\par 11-	Panel Specific Antigen testing (requested)\par 12-	Hepatitis A testing (requested)\par 13-	Hepatitis B testing (requested)\par 14-	Hepatitis C testing (requested)\par 15-	I communicated my evaluation with referring Nephrologist (via letter).\par 16-	I discussed case and testing with our Transplant Nurse Practitioner Coordinator\par 17- I reviewed FROILAN Packer note (02-): "...treatment for diabetic retinopathy"\par TREATMENT PLAN:\par 1-	Patient with end stage kidney disease.\par 2-	Referred for kidney transplantation based on GFR < 20 as per referring physician’s testing.\par 3-	GFR < 20 qualifies patient for kidney transplantation.\par 4-	I recommend kidney transplantation surgery as treatment based on my encounter with the patient and available testing reviewed.  Benefits (prolonged survival and enhanced quality of life) and risks (major surgical intervention that will require at least 3 days of hospitalization and placement of vascular and urinary catheters, increased incidence of infections and malignancies associated with immunosuppression, infections, urine leaks, bleeding, among many more) have been discussed by me as well as by other team members. \par 5-	Patient is aware that immunosuppression drug treatment will be required for life or for as long as the transplant is functioning.  Immunosuppression management will require intensive monitoring of levels and side effects, initially on a daily level as an inpatient and at least weekly as an outpatient.  \par 6-	Will proceed with our protocol evaluation.\par 7-	Patient will be presented to our selection committee for final approval.\par 8-	Risk factors include:\par - diabetes

## 2023-06-05 NOTE — CONSULT LETTER
[Dear  ___] : Dear  [unfilled], [Consult Letter:] : I had the pleasure of evaluating your patient, [unfilled]. [Please see my note below.] : Please see my note below. [Consult Closing:] : Thank you very much for allowing me to participate in the care of this patient.  If you have any questions, please do not hesitate to contact me. [Sincerely,] : Sincerely, [FreeTextEntry2] : Josh Kerr MD [FreeTextEntry3] : Moody Urias

## 2023-06-05 NOTE — HISTORY OF PRESENT ILLNESS
[Diabetes Mellitus] : Diabetes Mellitus [Diabetes] : diabetes [Retinopathy] : retinopathy [Neuropathy] : neuropathy [Annual Foot Exams] : annual foot exams [Insulin] : insulin treatment [Not Working] : Not working [80: Normal activity with effort; some signs or symptoms of disease.] : 80: Normal activity with effort; some signs or symptoms of disease.  [Previous Kidney Transplant] : no previous kidney transplant [Cardiac History] : no cardiac history [Blood Transfusion] : no prior blood transfusion [Hx of DVT/Thrombosis/Miscarriage] : no history of dvt/thrombosis/miscarriage [TextBox_16] : 2023 [TextBox_20] : 2015 [TextBox_24] : At age 51 [TextBox_28] : At age 46 [TextBox_30] : 2 miles [FreeTextEntry3] : Previously worked as an  [TextBox_42] : Born in [place]\par \par Left forearm AV fistula\par \par Father:  - age 45 - unknown cause of death\par Mother:  - age 85 - DM\par Family history of kidney disease: no\par \par \par 10 children, 16 grandchildren\par Smoking: no\par Drinking: no\par Potential live donors: yes (daughter)\par

## 2023-06-05 NOTE — PLAN
[We should proceed with our protocol for kidney transplantation evaluation.] : We should proceed with our protocol for kidney transplantation evaluation. [TextBox_6] : - CT angiogram of abdomen and pelvis\par - chest CT

## 2023-06-13 ENCOUNTER — APPOINTMENT (OUTPATIENT)
Dept: CARDIOLOGY | Facility: CLINIC | Age: 68
End: 2023-06-13
Payer: COMMERCIAL

## 2023-06-13 ENCOUNTER — NON-APPOINTMENT (OUTPATIENT)
Age: 68
End: 2023-06-13

## 2023-06-13 VITALS
HEART RATE: 83 BPM | OXYGEN SATURATION: 98 % | HEIGHT: 69 IN | DIASTOLIC BLOOD PRESSURE: 80 MMHG | SYSTOLIC BLOOD PRESSURE: 150 MMHG | BODY MASS INDEX: 29.33 KG/M2 | WEIGHT: 198 LBS

## 2023-06-13 DIAGNOSIS — E11.9 TYPE 2 DIABETES MELLITUS W/OUT COMPLICATIONS: ICD-10-CM

## 2023-06-13 DIAGNOSIS — E78.5 HYPERLIPIDEMIA, UNSPECIFIED: ICD-10-CM

## 2023-06-13 DIAGNOSIS — I10 ESSENTIAL (PRIMARY) HYPERTENSION: ICD-10-CM

## 2023-06-13 DIAGNOSIS — I50.9 HEART FAILURE, UNSPECIFIED: ICD-10-CM

## 2023-06-13 PROCEDURE — 93000 ELECTROCARDIOGRAM COMPLETE: CPT | Mod: NC

## 2023-06-13 PROCEDURE — 99204 OFFICE O/P NEW MOD 45 MIN: CPT

## 2023-06-13 RX ORDER — CHLORHEXIDINE GLUCONATE 4 %
325 (65 FE) LIQUID (ML) TOPICAL
Refills: 0 | Status: DISCONTINUED | COMMUNITY
Start: 2023-02-14 | End: 2023-06-13

## 2023-06-15 ENCOUNTER — APPOINTMENT (OUTPATIENT)
Dept: CT IMAGING | Facility: IMAGING CENTER | Age: 68
End: 2023-06-15

## 2023-06-30 ENCOUNTER — NON-APPOINTMENT (OUTPATIENT)
Age: 68
End: 2023-06-30

## 2023-07-06 ENCOUNTER — APPOINTMENT (OUTPATIENT)
Dept: CARDIOLOGY | Facility: CLINIC | Age: 68
End: 2023-07-06
Payer: COMMERCIAL

## 2023-07-06 PROCEDURE — 78452 HT MUSCLE IMAGE SPECT MULT: CPT

## 2023-07-06 PROCEDURE — A9500: CPT

## 2023-07-06 PROCEDURE — 93015 CV STRESS TEST SUPVJ I&R: CPT

## 2023-07-07 NOTE — END OF VISIT
[FreeTextEntry3] : I saw the patient with Ofelia Lazaro NP and I agree with the clinical findings, exam and assessment and plan as above.\par

## 2023-07-07 NOTE — PHYSICAL EXAM
[Well Developed] : well developed [Well Nourished] : well nourished [No Acute Distress] : no acute distress [Normal Conjunctiva] : normal conjunctiva [Normal Venous Pressure] : normal venous pressure [No Carotid Bruit] : no carotid bruit [Normal S1, S2] : normal S1, S2 [No Murmur] : no murmur [No Rub] : no rub [No Gallop] : no gallop [Clear Lung Fields] : clear lung fields [Good Air Entry] : good air entry [No Respiratory Distress] : no respiratory distress  [Soft] : abdomen soft [Non Tender] : non-tender [No Masses/organomegaly] : no masses/organomegaly [Normal Bowel Sounds] : normal bowel sounds [Normal Gait] : normal gait [No Edema] : no edema [No Cyanosis] : no cyanosis [No Clubbing] : no clubbing [No Varicosities] : no varicosities [No Rash] : no rash [No Skin Lesions] : no skin lesions [Moves all extremities] : moves all extremities [No Focal Deficits] : no focal deficits [Normal Speech] : normal speech [Alert and Oriented] : alert and oriented [Normal memory] : normal memory [de-identified] : left forearm AVF +/+, swollen

## 2023-07-07 NOTE — HISTORY OF PRESENT ILLNESS
[FreeTextEntry1] : Brian Cadet presents today for preoperative cardiovascular evaluation prior to possible kidney transplant. \par One daughter may be a potential living donor. \par \par He is a 67 year old, born in [place] and living in the [place] years, with known cardiac risk factors of chronic hypertension (diagnosed age 51), dyslipidemia (diagnosed a few years ago), diabetes mellitus (DM2 x many years, took away his medications 2/2/ renal disease, not currently on medications, A1c 5.7%) and end stage renal disease on hemodialysis (HD since 2023 via left forearm AVF, -W- at [place]).\par \par Today he reports feeling well and has no specific complaints. He remains fully active by walking 2 miles almost daily and reports no issues with stairs. With exercise he feels well and denies any chest discomfort, shortness of breath, palpitations, lightheadedness or syncope.\par \par Father is . He  at age 45, cause of death is unknown\par Mother is . She  at age 85. She had DM. \par . He has 10 children. All are alive and healthy. 16 grandchildren. \par Previously he worked as an . \par

## 2023-07-07 NOTE — DISCUSSION/SUMMARY
[EKG obtained to assist in diagnosis and management of assessed problem(s)] : EKG obtained to assist in diagnosis and management of assessed problem(s) [FreeTextEntry1] : He is a 67 year old [facility] man who presents today for preoperative cardiovascular evaluation prior to possible kidney transplant with known cardiac risk factors as above.\par \par His blood pressure today is 140/80 mm Hg. No changes to his medications were suggested. \par He will continue statin therapy for cardiovascular risk reduction. \par ECG today reveals normal sinus rhythm at 84 BPM.\par \par Recent echocardiogram as above.\par A nuclear stress test will evaluate for any evidence of exercise induced ischemia. \par \par Follow up pending results.

## 2023-07-07 NOTE — CARDIOLOGY SUMMARY
[de-identified] : PHYSICIAN INTERPRETATION:\par Left Ventricle: The left ventricular internal cavity size is mildly \par increased.\par Global LV systolic function was normal. Left ventricular ejection \par fraction, by visual estimation, is 55 to 60%. Spectral Doppler shows \par pseudonormal pattern of left ventricular myocardial filling (Grade II \par diastolic dysfunction).\par Left Atrium: Severely enlarged left atrium.\par Mitral Valve: Mild thickening of the anterior and posterior mitral valve \par leaflets. Mild to moderate mitral valve regurgitation is seen.\par Tricuspid Valve: Mild tricuspid regurgitation is visualized. Estimated \par pulmonary artery systolic pressure is 45.9 mmHg assuming a right atrial \par pressure of 8 mmHg, which is consistent with mild pulmonary hypertension.\par Aortic Valve: The aortic valve is trileaflet. Sclerotic aortic valve with \par normal opening.\par \par \par Summary:\par  1. Left ventricular ejection fraction, by visual estimation, is 55 to \par 60%.\par  2. Normal global left ventricular systolic function.\par  3. Severely enlarged left atrium.\par  4. Mildly increased left ventricular internal cavity size.\par  5. Spectral Doppler shows pseudonormal pattern of left ventricular \par myocardial filling (Grade II diastolic dysfunction).\par  6. There is moderate concentric left ventricular hypertrophy.\par  7. Sclerotic aortic valve with normal opening.\par  8. Estimated pulmonary artery systolic pressure is 45.9 mmHg assuming a \par right atrial pressure of 8 mmHg, which is consistent with mild pulmonary \par hypertension.\par  9. 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 Mild thickening of the anterior and posterior mitral valve leaflets.\par \par \par [id number] Kali Fields MD, FACC\par Electronically signed on 1/18/2023 at 5:24:54 PM\par

## 2023-07-16 ENCOUNTER — OUTPATIENT (OUTPATIENT)
Dept: OUTPATIENT SERVICES | Facility: HOSPITAL | Age: 68
LOS: 1 days | End: 2023-07-16
Payer: COMMERCIAL

## 2023-07-16 ENCOUNTER — APPOINTMENT (OUTPATIENT)
Dept: CT IMAGING | Facility: IMAGING CENTER | Age: 68
End: 2023-07-16
Payer: COMMERCIAL

## 2023-07-16 DIAGNOSIS — Z01.818 ENCOUNTER FOR OTHER PREPROCEDURAL EXAMINATION: ICD-10-CM

## 2023-07-16 PROCEDURE — 71250 CT THORAX DX C-: CPT

## 2023-07-16 PROCEDURE — 71250 CT THORAX DX C-: CPT | Mod: 26

## 2023-07-16 PROCEDURE — 74174 CTA ABD&PLVS W/CONTRAST: CPT | Mod: 26

## 2023-07-16 PROCEDURE — 74174 CTA ABD&PLVS W/CONTRAST: CPT

## 2023-07-25 ENCOUNTER — NON-APPOINTMENT (OUTPATIENT)
Age: 68
End: 2023-07-25

## 2023-09-19 ENCOUNTER — APPOINTMENT (OUTPATIENT)
Dept: GASTROENTEROLOGY | Facility: CLINIC | Age: 68
End: 2023-09-19
Payer: MEDICARE

## 2023-09-19 VITALS
HEIGHT: 69 IN | DIASTOLIC BLOOD PRESSURE: 79 MMHG | OXYGEN SATURATION: 98 % | HEART RATE: 82 BPM | WEIGHT: 195.33 LBS | SYSTOLIC BLOOD PRESSURE: 129 MMHG | BODY MASS INDEX: 28.93 KG/M2

## 2023-09-19 DIAGNOSIS — N18.6 END STAGE RENAL DISEASE: ICD-10-CM

## 2023-09-19 DIAGNOSIS — Z12.11 ENCOUNTER FOR SCREENING FOR MALIGNANT NEOPLASM OF COLON: ICD-10-CM

## 2023-09-19 DIAGNOSIS — Z99.2 END STAGE RENAL DISEASE: ICD-10-CM

## 2023-09-19 PROCEDURE — 99203 OFFICE O/P NEW LOW 30 MIN: CPT

## 2023-09-22 PROBLEM — N18.6 END STAGE RENAL FAILURE ON DIALYSIS: Status: ACTIVE | Noted: 2023-05-30

## 2023-10-05 ENCOUNTER — OUTPATIENT (OUTPATIENT)
Dept: OUTPATIENT SERVICES | Facility: HOSPITAL | Age: 68
LOS: 1 days | End: 2023-10-05
Payer: MEDICARE

## 2023-10-05 VITALS
SYSTOLIC BLOOD PRESSURE: 147 MMHG | TEMPERATURE: 98 F | RESPIRATION RATE: 15 BRPM | WEIGHT: 192.9 LBS | OXYGEN SATURATION: 98 % | HEIGHT: 69 IN | HEART RATE: 100 BPM | DIASTOLIC BLOOD PRESSURE: 80 MMHG

## 2023-10-05 DIAGNOSIS — Z12.11 ENCOUNTER FOR SCREENING FOR MALIGNANT NEOPLASM OF COLON: ICD-10-CM

## 2023-10-05 DIAGNOSIS — Z01.818 ENCOUNTER FOR OTHER PREPROCEDURAL EXAMINATION: ICD-10-CM

## 2023-10-05 DIAGNOSIS — Z98.890 OTHER SPECIFIED POSTPROCEDURAL STATES: Chronic | ICD-10-CM

## 2023-10-05 LAB
A1C WITH ESTIMATED AVERAGE GLUCOSE RESULT: 6.6 % — HIGH (ref 4–5.6)
ANION GAP SERPL CALC-SCNC: 17 MMOL/L — SIGNIFICANT CHANGE UP (ref 5–17)
BUN SERPL-MCNC: 39 MG/DL — HIGH (ref 7–23)
CALCIUM SERPL-MCNC: 9.5 MG/DL — SIGNIFICANT CHANGE UP (ref 8.4–10.5)
CHLORIDE SERPL-SCNC: 91 MMOL/L — LOW (ref 96–108)
CO2 SERPL-SCNC: 27 MMOL/L — SIGNIFICANT CHANGE UP (ref 22–31)
CREAT SERPL-MCNC: 8.42 MG/DL — HIGH (ref 0.5–1.3)
EGFR: 6 ML/MIN/1.73M2 — LOW
ESTIMATED AVERAGE GLUCOSE: 143 MG/DL — HIGH (ref 68–114)
GLUCOSE SERPL-MCNC: 176 MG/DL — HIGH (ref 70–99)
HCT VFR BLD CALC: 36.4 % — LOW (ref 39–50)
HGB BLD-MCNC: 11.8 G/DL — LOW (ref 13–17)
MCHC RBC-ENTMCNC: 31.1 PG — SIGNIFICANT CHANGE UP (ref 27–34)
MCHC RBC-ENTMCNC: 32.4 GM/DL — SIGNIFICANT CHANGE UP (ref 32–36)
MCV RBC AUTO: 96 FL — SIGNIFICANT CHANGE UP (ref 80–100)
NRBC # BLD: 0 /100 WBCS — SIGNIFICANT CHANGE UP (ref 0–0)
PLATELET # BLD AUTO: 104 K/UL — LOW (ref 150–400)
POTASSIUM SERPL-MCNC: 4.1 MMOL/L — SIGNIFICANT CHANGE UP (ref 3.5–5.3)
POTASSIUM SERPL-SCNC: 4.1 MMOL/L — SIGNIFICANT CHANGE UP (ref 3.5–5.3)
RBC # BLD: 3.79 M/UL — LOW (ref 4.2–5.8)
RBC # FLD: 13.2 % — SIGNIFICANT CHANGE UP (ref 10.3–14.5)
SODIUM SERPL-SCNC: 135 MMOL/L — SIGNIFICANT CHANGE UP (ref 135–145)
WBC # BLD: 4.85 K/UL — SIGNIFICANT CHANGE UP (ref 3.8–10.5)
WBC # FLD AUTO: 4.85 K/UL — SIGNIFICANT CHANGE UP (ref 3.8–10.5)

## 2023-10-05 PROCEDURE — 80048 BASIC METABOLIC PNL TOTAL CA: CPT

## 2023-10-05 PROCEDURE — G0463: CPT

## 2023-10-05 PROCEDURE — 85027 COMPLETE CBC AUTOMATED: CPT

## 2023-10-05 PROCEDURE — 83036 HEMOGLOBIN GLYCOSYLATED A1C: CPT

## 2023-10-05 NOTE — H&P PST ADULT - IS PATIENT PREGNANT?
Patient states he needs a referral made for TAVR  He states he can come into the office to  this paperwork   He stated he will be taking it to [facility] not applicable (Male)

## 2023-10-05 NOTE — H&P PST ADULT - ENMT
Pt arrives to ER from [facility] for low BP and dizziness. Denies chest pain, but experiences SOB when walking. ABCs intact, A&O x4   negative

## 2023-10-05 NOTE — H&P PST ADULT - OTHER CARE PROVIDERS
diagnosis [address] on [facility] diagnosis center on [facility]

## 2023-10-05 NOTE — H&P PST ADULT - HISTORY OF PRESENT ILLNESS
67 yo male. diet controlled T2DM (last Hg A1c=5.7), HTN, HLD, ESRD (HD TIW via LUE AVF  MWF), currently being worked up for kidney transplant, presents to PST scheduled for colonoscopy ANES on 6/17.

## 2023-10-05 NOTE — H&P PST ADULT - NSICDXPASTMEDICALHX_GEN_ALL_CORE_FT
PAST MEDICAL HISTORY:  Dyslipidemia     ESRD on dialysis     HTN (hypertension)     Renal insufficiency     Type 2 diabetes mellitus      PAST MEDICAL HISTORY:  Dyslipidemia     ESRD on dialysis     HTN (hypertension)     Type 2 diabetes mellitus

## 2023-10-19 ENCOUNTER — RESULT REVIEW (OUTPATIENT)
Age: 68
End: 2023-10-19

## 2023-10-19 ENCOUNTER — APPOINTMENT (OUTPATIENT)
Dept: GASTROENTEROLOGY | Facility: HOSPITAL | Age: 68
End: 2023-10-19

## 2023-10-19 ENCOUNTER — OUTPATIENT (OUTPATIENT)
Dept: OUTPATIENT SERVICES | Facility: HOSPITAL | Age: 68
LOS: 1 days | End: 2023-10-19
Payer: MEDICARE

## 2023-10-19 ENCOUNTER — TRANSCRIPTION ENCOUNTER (OUTPATIENT)
Age: 68
End: 2023-10-19

## 2023-10-19 VITALS
DIASTOLIC BLOOD PRESSURE: 62 MMHG | RESPIRATION RATE: 15 BRPM | WEIGHT: 198.42 LBS | HEART RATE: 65 BPM | HEIGHT: 69 IN | TEMPERATURE: 98 F | SYSTOLIC BLOOD PRESSURE: 113 MMHG | OXYGEN SATURATION: 100 %

## 2023-10-19 VITALS
RESPIRATION RATE: 18 BRPM | DIASTOLIC BLOOD PRESSURE: 70 MMHG | HEART RATE: 62 BPM | OXYGEN SATURATION: 99 % | SYSTOLIC BLOOD PRESSURE: 155 MMHG

## 2023-10-19 DIAGNOSIS — Z01.818 ENCOUNTER FOR OTHER PREPROCEDURAL EXAMINATION: ICD-10-CM

## 2023-10-19 DIAGNOSIS — Z12.11 ENCOUNTER FOR SCREENING FOR MALIGNANT NEOPLASM OF COLON: ICD-10-CM

## 2023-10-19 DIAGNOSIS — Z98.890 OTHER SPECIFIED POSTPROCEDURAL STATES: Chronic | ICD-10-CM

## 2023-10-19 LAB
GLUCOSE BLDC GLUCOMTR-MCNC: 160 MG/DL — HIGH (ref 70–99)
GLUCOSE BLDC GLUCOMTR-MCNC: 160 MG/DL — HIGH (ref 70–99)

## 2023-10-19 PROCEDURE — 45385 COLONOSCOPY W/LESION REMOVAL: CPT | Mod: 52,PT

## 2023-10-19 PROCEDURE — 45385 COLONOSCOPY W/LESION REMOVAL: CPT | Mod: GC,52

## 2023-10-19 PROCEDURE — 88342 IMHCHEM/IMCYTCHM 1ST ANTB: CPT | Mod: 26

## 2023-10-19 PROCEDURE — 88312 SPECIAL STAINS GROUP 1: CPT | Mod: 26

## 2023-10-19 PROCEDURE — 88305 TISSUE EXAM BY PATHOLOGIST: CPT | Mod: 26

## 2023-10-19 PROCEDURE — 88312 SPECIAL STAINS GROUP 1: CPT

## 2023-10-19 PROCEDURE — 88305 TISSUE EXAM BY PATHOLOGIST: CPT

## 2023-10-19 PROCEDURE — 82962 GLUCOSE BLOOD TEST: CPT

## 2023-10-19 PROCEDURE — 88341 IMHCHEM/IMCYTCHM EA ADD ANTB: CPT

## 2023-10-19 DEVICE — NET RETRV ROT ROTH 2.5MMX230CM: Type: IMPLANTABLE DEVICE | Status: FUNCTIONAL

## 2023-10-19 RX ORDER — SODIUM CHLORIDE 9 MG/ML
500 INJECTION INTRAMUSCULAR; INTRAVENOUS; SUBCUTANEOUS
Refills: 0 | Status: COMPLETED | OUTPATIENT
Start: 2023-10-19 | End: 2023-10-19

## 2023-10-19 RX ADMIN — SODIUM CHLORIDE 30 MILLILITER(S): 9 INJECTION INTRAMUSCULAR; INTRAVENOUS; SUBCUTANEOUS at 10:17

## 2023-10-19 NOTE — ASU DISCHARGE PLAN (ADULT/PEDIATRIC) - NS MD DC FALL RISK RISK
For information on Fall & Injury Prevention, visit: [email or web address] OR  [email or web address] OR  https://www.cdc.gov/steadi/patient.html

## 2023-10-19 NOTE — ASU PATIENT PROFILE, ADULT - NSICDXPASTMEDICALHX_GEN_ALL_CORE_FT
PAST MEDICAL HISTORY:  Dyslipidemia     ESRD on dialysis     HTN (hypertension)     Type 2 diabetes mellitus

## 2023-10-19 NOTE — PRE PROCEDURE NOTE - PRE PROCEDURE EVALUATION
Attending Physician:     Aidan Chan                       Procedure: colonoscopy     Indication for Procedure: screening  ________________________________________________________  PAST MEDICAL & SURGICAL HISTORY:  HTN (hypertension)      Type 2 diabetes mellitus      ESRD on dialysis      Dyslipidemia      S/P arteriovenous (AV) fistula creation        ALLERGIES:  No Known Allergies    HOME MEDICATIONS:  atenolol 25 mg oral tablet: 1 tab(s) orally once a day    AICD/PPM: [ ] yes   [x ] no    PERTINENT LAB DATA:                      PHYSICAL EXAMINATION:    Height (cm): 175.3  Weight (kg): 90  BMI (kg/m2): 29.3  BSA (m2): 2.06T(C): --  HR: --  BP: --  RR: --  SpO2: --    Constitutional: NAD  HEENT: PERRLA, EOMI,    Neck:  No JVD  Respiratory: CTAB/L  Cardiovascular: S1 and S2  Gastrointestinal: BS+, soft, NT/ND  Extremities: No peripheral edema  Neurological: A/O x 3, no focal deficits  Psychiatric: Normal mood, normal affect  Skin: No rashes    ASA Class: I [ ]  II [ ]  III [x ]  IV [ ]    COMMENTS:    The patient is a suitable candidate for the planned procedure unless box checked [ ]  No, explain:

## 2023-10-25 LAB
SURGICAL PATHOLOGY STUDY: SIGNIFICANT CHANGE UP
SURGICAL PATHOLOGY STUDY: SIGNIFICANT CHANGE UP

## 2023-11-01 PROBLEM — E78.5 HYPERLIPIDEMIA, UNSPECIFIED: Chronic | Status: ACTIVE | Noted: 2023-10-05

## 2023-11-01 PROBLEM — E11.9 TYPE 2 DIABETES MELLITUS WITHOUT COMPLICATIONS: Chronic | Status: ACTIVE | Noted: 2023-10-05

## 2023-11-07 RX ORDER — POLYETHYLENE GLYCOL 3350 17 G/17G
17 POWDER, FOR SOLUTION ORAL DAILY
Qty: 510 | Refills: 0 | Status: ACTIVE | COMMUNITY
Start: 2023-11-07 | End: 1900-01-01

## 2023-12-04 NOTE — ED PROVIDER NOTE - EYES, MLM
What Type Of Note Output Would You Prefer (Optional)?: Standard Output
How Severe Is Your Skin Lesion?: mild
Is This A New Presentation, Or A Follow-Up?: Skin Lesions
Clear bilaterally, pupils equal, round and reactive to light.

## 2023-12-19 ENCOUNTER — RX RENEWAL (OUTPATIENT)
Age: 68
End: 2023-12-19

## 2023-12-19 RX ORDER — POLYETHYLENE GLYCOL-3350 AND ELECTROLYTES 236; 6.74; 5.86; 2.97; 22.74 G/274.31G; G/274.31G; G/274.31G; G/274.31G; G/274.31G
236 POWDER, FOR SOLUTION ORAL
Qty: 4000 | Refills: 0 | Status: ACTIVE | COMMUNITY
Start: 2023-09-19 | End: 1900-01-01

## 2024-01-04 ENCOUNTER — OUTPATIENT (OUTPATIENT)
Dept: OUTPATIENT SERVICES | Facility: HOSPITAL | Age: 69
LOS: 1 days | End: 2024-01-04
Payer: MEDICARE

## 2024-01-04 ENCOUNTER — APPOINTMENT (OUTPATIENT)
Dept: GASTROENTEROLOGY | Facility: HOSPITAL | Age: 69
End: 2024-01-04

## 2024-01-04 ENCOUNTER — RESULT REVIEW (OUTPATIENT)
Age: 69
End: 2024-01-04

## 2024-01-04 ENCOUNTER — TRANSCRIPTION ENCOUNTER (OUTPATIENT)
Age: 69
End: 2024-01-04

## 2024-01-04 VITALS
TEMPERATURE: 98 F | HEART RATE: 68 BPM | HEIGHT: 69 IN | OXYGEN SATURATION: 97 % | WEIGHT: 198.42 LBS | SYSTOLIC BLOOD PRESSURE: 111 MMHG | DIASTOLIC BLOOD PRESSURE: 54 MMHG | RESPIRATION RATE: 18 BRPM

## 2024-01-04 VITALS
SYSTOLIC BLOOD PRESSURE: 115 MMHG | OXYGEN SATURATION: 98 % | HEART RATE: 70 BPM | RESPIRATION RATE: 20 BRPM | DIASTOLIC BLOOD PRESSURE: 80 MMHG

## 2024-01-04 DIAGNOSIS — Z98.890 OTHER SPECIFIED POSTPROCEDURAL STATES: Chronic | ICD-10-CM

## 2024-01-04 DIAGNOSIS — Z12.11 ENCOUNTER FOR SCREENING FOR MALIGNANT NEOPLASM OF COLON: ICD-10-CM

## 2024-01-04 LAB
BASE EXCESS BLDV CALC-SCNC: 3.4 MMOL/L — HIGH (ref -2–3)
BASE EXCESS BLDV CALC-SCNC: 3.4 MMOL/L — HIGH (ref -2–3)
CA-I SERPL-SCNC: 1.1 MMOL/L — LOW (ref 1.15–1.33)
CA-I SERPL-SCNC: 1.1 MMOL/L — LOW (ref 1.15–1.33)
CHLORIDE BLDV-SCNC: 96 MMOL/L — SIGNIFICANT CHANGE UP (ref 96–108)
CHLORIDE BLDV-SCNC: 96 MMOL/L — SIGNIFICANT CHANGE UP (ref 96–108)
CO2 BLDV-SCNC: 31 MMOL/L — HIGH (ref 22–26)
CO2 BLDV-SCNC: 31 MMOL/L — HIGH (ref 22–26)
GAS PNL BLDV: 138 MMOL/L — SIGNIFICANT CHANGE UP (ref 136–145)
GAS PNL BLDV: 138 MMOL/L — SIGNIFICANT CHANGE UP (ref 136–145)
GAS PNL BLDV: SIGNIFICANT CHANGE UP
GLUCOSE BLDC GLUCOMTR-MCNC: 163 MG/DL — HIGH (ref 70–99)
GLUCOSE BLDC GLUCOMTR-MCNC: 163 MG/DL — HIGH (ref 70–99)
GLUCOSE BLDV-MCNC: 165 MG/DL — HIGH (ref 70–99)
GLUCOSE BLDV-MCNC: 165 MG/DL — HIGH (ref 70–99)
HCO3 BLDV-SCNC: 29 MMOL/L — SIGNIFICANT CHANGE UP (ref 22–29)
HCO3 BLDV-SCNC: 29 MMOL/L — SIGNIFICANT CHANGE UP (ref 22–29)
HCT VFR BLDA CALC: 35 % — LOW (ref 39–51)
HCT VFR BLDA CALC: 35 % — LOW (ref 39–51)
HGB BLD CALC-MCNC: 11.5 G/DL — LOW (ref 12.6–17.4)
HGB BLD CALC-MCNC: 11.5 G/DL — LOW (ref 12.6–17.4)
LACTATE BLDV-MCNC: 2.4 MMOL/L — HIGH (ref 0.5–2)
LACTATE BLDV-MCNC: 2.4 MMOL/L — HIGH (ref 0.5–2)
PCO2 BLDV: 48 MMHG — SIGNIFICANT CHANGE UP (ref 42–55)
PCO2 BLDV: 48 MMHG — SIGNIFICANT CHANGE UP (ref 42–55)
PH BLDV: 7.39 — SIGNIFICANT CHANGE UP (ref 7.32–7.43)
PH BLDV: 7.39 — SIGNIFICANT CHANGE UP (ref 7.32–7.43)
PO2 BLDV: 85 MMHG — HIGH (ref 25–45)
PO2 BLDV: 85 MMHG — HIGH (ref 25–45)
POTASSIUM BLDV-SCNC: 4.6 MMOL/L — SIGNIFICANT CHANGE UP (ref 3.5–5.1)
POTASSIUM BLDV-SCNC: 4.6 MMOL/L — SIGNIFICANT CHANGE UP (ref 3.5–5.1)
SAO2 % BLDV: 97.3 % — HIGH (ref 67–88)
SAO2 % BLDV: 97.3 % — HIGH (ref 67–88)

## 2024-01-04 PROCEDURE — 45385 COLONOSCOPY W/LESION REMOVAL: CPT

## 2024-01-04 PROCEDURE — 45385 COLONOSCOPY W/LESION REMOVAL: CPT | Mod: PT

## 2024-01-04 PROCEDURE — 82330 ASSAY OF CALCIUM: CPT

## 2024-01-04 PROCEDURE — 82962 GLUCOSE BLOOD TEST: CPT

## 2024-01-04 PROCEDURE — 88305 TISSUE EXAM BY PATHOLOGIST: CPT

## 2024-01-04 PROCEDURE — 82435 ASSAY OF BLOOD CHLORIDE: CPT

## 2024-01-04 PROCEDURE — 83605 ASSAY OF LACTIC ACID: CPT

## 2024-01-04 PROCEDURE — 85018 HEMOGLOBIN: CPT

## 2024-01-04 PROCEDURE — 84295 ASSAY OF SERUM SODIUM: CPT

## 2024-01-04 PROCEDURE — 84132 ASSAY OF SERUM POTASSIUM: CPT

## 2024-01-04 PROCEDURE — 85014 HEMATOCRIT: CPT

## 2024-01-04 PROCEDURE — 88305 TISSUE EXAM BY PATHOLOGIST: CPT | Mod: 26

## 2024-01-04 PROCEDURE — 82947 ASSAY GLUCOSE BLOOD QUANT: CPT

## 2024-01-04 PROCEDURE — 82803 BLOOD GASES ANY COMBINATION: CPT

## 2024-01-04 DEVICE — NET RETRV ROT ROTH 2.5MMX230CM: Type: IMPLANTABLE DEVICE | Status: FUNCTIONAL

## 2024-01-04 RX ORDER — ATENOLOL 25 MG/1
1 TABLET ORAL
Qty: 0 | Refills: 0 | DISCHARGE

## 2024-01-04 RX ORDER — SODIUM CHLORIDE 9 MG/ML
500 INJECTION INTRAMUSCULAR; INTRAVENOUS; SUBCUTANEOUS
Refills: 0 | Status: DISCONTINUED | OUTPATIENT
Start: 2024-01-04 | End: 2024-01-18

## 2024-01-04 NOTE — ASU PATIENT PROFILE, ADULT - NSICDXPASTMEDICALHX_GEN_ALL_CORE_FT
PAST MEDICAL HISTORY:  Dyslipidemia     ESRD on dialysis mwf    HTN (hypertension)     Type 2 diabetes mellitus

## 2024-01-04 NOTE — ASU DISCHARGE PLAN (ADULT/PEDIATRIC) - NS MD DC FALL RISK RISK
For information on Fall & Injury Prevention, visit: [email or web address]/news/[email or web address]-[email or web address]-[email or web address]-mobility OR  https://www.[facility]/news/fall-[email or web address]-tips-[email or web address]-injury OR  https://www.cdc.gov/steadi/patient.html For information on Fall & Injury Prevention, visit: [email or web address]://[email or web address].[email or web address]-mobility OR  [email or web address]://[email or web address].[email or web address]/[email or web address]-[email or web address]-[email or web address]-avoid-injury OR  https://www.cdc.gov/steadi/patient.html

## 2024-01-04 NOTE — PRE PROCEDURE NOTE - PRE PROCEDURE EVALUATION
Attending Physician:    Aidan Chan                        Procedure: cscope    Indication for Procedure: screening  ________________________________________________________  PAST MEDICAL & SURGICAL HISTORY:  HTN (hypertension)      Type 2 diabetes mellitus      ESRD on dialysis  mwf      Dyslipidemia      S/P arteriovenous (AV) fistula creation        ALLERGIES:  No Known Allergies    HOME MEDICATIONS:  atenolol 25 mg oral tablet: 1 tab(s) orally once a day    AICD/PPM: [ ] yes   [x ] no    PERTINENT LAB DATA:                      PHYSICAL EXAMINATION:    Height (cm): 175.3  Weight (kg): 90  BMI (kg/m2): 29.3  BSA (m2): 2.06T(C): 36.7  HR: 68  BP: 111/54  RR: 18  SpO2: 97%    Constitutional: NAD  HEENT: PERRLA, EOMI,    Neck:  No JVD  Respiratory: CTAB/L  Cardiovascular: S1 and S2  Gastrointestinal: BS+, soft, NT/ND  Extremities: No peripheral edema  Neurological: A/O x 3, no focal deficits  Psychiatric: Normal mood, normal affect  Skin: No rashes    ASA Class: I [ ]  II [ ]  III [x ]  IV [ ]    COMMENTS:    The patient is a suitable candidate for the planned procedure unless box checked [ ]  No, explain:

## 2024-01-04 NOTE — ASU PREOP CHECKLIST - BOWEL PREP
temazepam      Last Written Prescription Date:  10/5/16  Last Fill Quantity: 30,   # refills: 6  Last Office Visit with [facility] [place] [place], [facility] or  Health prescribing provider: 10/5/16  Future Office visit:       Routing refill request to provider for review/approval because:  Drug not on the [facility] [place] [place], [facility] or  [employer] refill protocol or controlled substance       done

## 2024-01-04 NOTE — ASU PATIENT PROFILE, ADULT - FALL HARM RISK - UNIVERSAL INTERVENTIONS
Bed in lowest position, wheels locked, appropriate side rails in place/Call bell, personal items and telephone in reach/Instruct patient to call for assistance before getting out of bed or chair/Non-slip footwear when patient is out of bed/[place] to call system/Physically safe environment - no spills, clutter or unnecessary equipment/Purposeful Proactive Rounding/Room/bathroom lighting operational, light cord in reach Bed in lowest position, wheels locked, appropriate side rails in place/Call bell, personal items and telephone in reach/Instruct patient to call for assistance before getting out of bed or chair/Non-slip footwear when patient is out of bed/[place] to call system/Physically safe environment - no spills, clutter or unnecessary equipment/Purposeful Proactive Rounding/Room/bathroom lighting operational, light cord in reach

## 2024-01-08 LAB
SURGICAL PATHOLOGY STUDY: SIGNIFICANT CHANGE UP
SURGICAL PATHOLOGY STUDY: SIGNIFICANT CHANGE UP

## 2024-01-18 ENCOUNTER — NON-APPOINTMENT (OUTPATIENT)
Age: 69
End: 2024-01-18

## 2024-01-19 ENCOUNTER — NON-APPOINTMENT (OUTPATIENT)
Age: 69
End: 2024-01-19

## 2024-01-25 ENCOUNTER — NON-APPOINTMENT (OUTPATIENT)
Age: 69
End: 2024-01-25

## 2024-01-27 PROBLEM — N18.6 END STAGE RENAL DISEASE: Chronic | Status: ACTIVE | Noted: 2023-10-05

## 2024-05-22 PROBLEM — Z01.818 PRE-TRANSPLANT EVALUATION FOR KIDNEY TRANSPLANT: Status: ACTIVE | Noted: 2023-05-30

## 2024-05-23 ENCOUNTER — LABORATORY RESULT (OUTPATIENT)
Age: 69
End: 2024-05-23

## 2024-05-23 ENCOUNTER — APPOINTMENT (OUTPATIENT)
Dept: NEPHROLOGY | Facility: CLINIC | Age: 69
End: 2024-05-23
Payer: COMMERCIAL

## 2024-05-23 VITALS
SYSTOLIC BLOOD PRESSURE: 115 MMHG | OXYGEN SATURATION: 97 % | RESPIRATION RATE: 14 BRPM | TEMPERATURE: 98.3 F | HEART RATE: 100 BPM | BODY MASS INDEX: 29.09 KG/M2 | DIASTOLIC BLOOD PRESSURE: 75 MMHG | WEIGHT: 196.43 LBS | HEIGHT: 69 IN

## 2024-05-23 DIAGNOSIS — Z01.818 ENCOUNTER FOR OTHER PREPROCEDURAL EXAMINATION: ICD-10-CM

## 2024-05-23 LAB
ABO + RH PNL BLD: NORMAL
ALBUMIN SERPL ELPH-MCNC: 4.9 G/DL
ALP BLD-CCNC: 124 U/L
ALT SERPL-CCNC: 9 U/L
ANION GAP SERPL CALC-SCNC: 16 MMOL/L
AST SERPL-CCNC: 14 U/L
BILIRUB SERPL-MCNC: 0.3 MG/DL
BUN SERPL-MCNC: 35 MG/DL
C PEPTIDE SERPL-MCNC: 7.5 NG/ML
CALCIUM SERPL-MCNC: 9.7 MG/DL
CHLORIDE SERPL-SCNC: 88 MMOL/L
CHOLEST SERPL-MCNC: 168 MG/DL
CK SERPL-CCNC: 182 U/L
CO2 SERPL-SCNC: 30 MMOL/L
COVID-19 SPIKE DOMAIN ANTIBODY INTERPRETATION: POSITIVE
CREAT SERPL-MCNC: 9.33 MG/DL
CREAT SPEC-SCNC: 146 MG/DL
CREAT/PROT UR: 1.6 RATIO
CRP SERPL-MCNC: <3 MG/L
EGFR: 6 ML/MIN/1.73M2
ERYTHROCYTE [SEDIMENTATION RATE] IN BLOOD BY WESTERGREN METHOD: 60 MM/HR
ESTIMATED AVERAGE GLUCOSE: 140 MG/DL
GLUCOSE SERPL-MCNC: 187 MG/DL
HAV IGM SER QL: NONREACTIVE
HBA1C MFR BLD HPLC: 6.5 %
HBV CORE IGG+IGM SER QL: REACTIVE
HBV SURFACE AB SER QL: REACTIVE
HBV SURFACE AB SERPL IA-ACNC: 13.2 MIU/ML
HBV SURFACE AG SER QL: NONREACTIVE
HCT VFR BLD CALC: 33.6 %
HCV AB SER QL: NONREACTIVE
HCV S/CO RATIO: 0.2 S/CO
HDLC SERPL-MCNC: 44 MG/DL
HEPATITIS A IGG ANTIBODY: REACTIVE
HGB BLD-MCNC: 11.2 G/DL
HIV1+2 AB SPEC QL IA.RAPID: NONREACTIVE
LDLC SERPL CALC-MCNC: 99 MG/DL
MAGNESIUM SERPL-MCNC: 2.6 MG/DL
MCHC RBC-ENTMCNC: 31.8 PG
MCHC RBC-ENTMCNC: 33.3 GM/DL
MCV RBC AUTO: 95.5 FL
NONHDLC SERPL-MCNC: 123 MG/DL
PHOSPHATE SERPL-MCNC: 4.8 MG/DL
PLATELET # BLD AUTO: 131 K/UL
POTASSIUM SERPL-SCNC: 4.8 MMOL/L
PROT SERPL-MCNC: 8.7 G/DL
PROT UR-MCNC: 231 MG/DL
PSA SERPL-MCNC: 2.49 NG/ML
RBC # BLD: 3.52 M/UL
RBC # FLD: 13.1 %
SARS-COV-2 AB SERPL IA-ACNC: >250 U/ML
SODIUM SERPL-SCNC: 134 MMOL/L
T3 SERPL-MCNC: 117 NG/DL
T4 FREE SERPL-MCNC: 1.3 NG/DL
TRIGL SERPL-MCNC: 137 MG/DL
TSH SERPL-ACNC: 2.11 UIU/ML
URATE SERPL-MCNC: 3.7 MG/DL
WBC # FLD AUTO: 5.52 K/UL

## 2024-05-23 PROCEDURE — 99215 OFFICE O/P EST HI 40 MIN: CPT

## 2024-05-23 NOTE — PLAN
[FreeTextEntry1] : Follows with Dr. Kerr Last Seen 5/2023 Listed 1/19/2024 Dialysis 1/2023 ABO O will check titer today PRA 0 % will repeat today  Most recent testing Cardiac - On 1/18/2023 Brian Cadet had a transthoracic echocardiogram which revealed normal left ventricular systolic function, moderate concentric left ventricular hypertrophy, grade II diastolic dysfunction, a severely enlarged left atrium, mild pulmonary hypertension and an LVEF of 55-60%.  On 7/6/2023 he had a nuclear stress test which revealed normal myocardial perfusion images with no evidence of infarction or inducible ischemia. LVEF 64%. LVEDV 45 mL.  No further cardiac testing is required prior to transplant consideration.  Radiology CT chest, abd and pelvis 7/16/2023- No significant findings. Minimal atherosclerotic changes. Widely patent aorta, IVC and iliac arteries/veins.  Cancer Screening PSA 6/1/2023- 2.27 Colonoscopy 1/4/2024: 1. Colon, transverse- Sessile serrated polyp; negative for dysplasia. 2. Colon, transverse #2- Colonic mucosa with superficial hyperplastic change. 3. Colon, transverse, polyp #3- Sessile serrated polyp; negative for dysplasia. 4. Colon, descending- Hyperplastic polyp.

## 2024-05-23 NOTE — PHYSICAL EXAM
[General Appearance - Alert] : alert [General Appearance - In No Acute Distress] : in no acute distress [Sclera] : the sclera and conjunctiva were normal [PERRL With Normal Accommodation] : pupils were equal in size, round, and reactive to light [Extraocular Movements] : extraocular movements were intact [Outer Ear] : the ears and nose were normal in appearance [Oropharynx] : the oropharynx was normal [Neck Appearance] : the appearance of the neck was normal [Neck Cervical Mass (___cm)] : no neck mass was observed [Jugular Venous Distention Increased] : there was no jugular-venous distention [Thyroid Diffuse Enlargement] : the thyroid was not enlarged [Thyroid Nodule] : there were no palpable thyroid nodules [Auscultation Breath Sounds / Voice Sounds] : lungs were clear to auscultation bilaterally [Heart Rate And Rhythm] : heart rate was normal and rhythm regular [Heart Sounds] : normal S1 and S2 [Heart Sounds Gallop] : no gallops [Murmurs] : no murmurs [Heart Sounds Pericardial Friction Rub] : no pericardial rub [Full Pulse] : the pedal pulses are present [Edema] : there was no peripheral edema [Bowel Sounds] : normal bowel sounds [Abdomen Soft] : soft [Abdomen Tenderness] : non-tender [Abdomen Mass (___ Cm)] : no abdominal mass palpated [Abnormal Walk] : normal gait [Nail Clubbing] : no clubbing  or cyanosis of the fingernails [Musculoskeletal - Swelling] : no joint swelling seen [Motor Tone] : muscle strength and tone were normal [Skin Color & Pigmentation] : normal skin color and pigmentation [Skin Turgor] : normal skin turgor [] : no rash [Normal] : normal [Deep Tendon Reflexes (DTR)] : deep tendon reflexes were 2+ and symmetric [Sensation] : the sensory exam was normal to light touch and pinprick [No Focal Deficits] : no focal deficits

## 2024-05-23 NOTE — HISTORY OF PRESENT ILLNESS
[TextBox_42] : INTERVAL EVENTS SINCE LAST ANNUAL - none   68 years old male, born in [place], [place] , living in [place] for over 30 years Patient has known CKD (), ESRD (2023) on follow up with Dr. Kerr is here for annual f/u for pre kidney transplant evaluation. He has known DM (age 46) On Metformin previously ; HTN (age 51). No h/o Hyperlipidemia / Gout Urine out put: small quantity Has no h/o Pneumonia / UTI. No known h/o active CAD/CVA/PVD/DVT/neoplasia/active infections/bleeding/open wounds/falls/seizures/psych issues/ COVID.  Most recent hospitalization/for:2023 for starting dilaysis  Past surgeries: Left forearm AVF No history of kidney/ bladder/ prostate surgery.  Non smoker. Lives Alone Parents are . Father -unsure Mother- DM Siblings- healthy. Children: ten, Healthy. No family history of kidney disease Independent for ADL Able to walk ten blocks, can climb stairs with difficulty. Functional/employment status:[employer]

## 2024-05-24 LAB
EBV EA AB SER IA-ACNC: 9.9 U/ML
EBV EA AB TITR SER IF: POSITIVE
EBV EA IGG SER QL IA: >600 U/ML
EBV EA IGG SER-ACNC: ABNORMAL
EBV EA IGM SER IA-ACNC: NEGATIVE
EBV PATRN SPEC IB-IMP: NORMAL
EBV VCA IGG SER IA-ACNC: 365 U/ML
EBV VCA IGM SER QL IA: <10 U/ML
EPSTEIN-BARR VIRUS CAPSID ANTIGEN IGG: POSITIVE
HSV 1+2 IGG SER IA-IMP: POSITIVE
HSV1 IGG SER QL: 35.6 INDEX
HSV1 IGG SER QL: 35.6 INDEX
HSV2 IGG SER QL: >23.6 INDEX
RUBV IGG FLD-ACNC: 24.6 INDEX
RUBV IGG SER-IMP: POSITIVE
T GONDII AB SER-IMP: NEGATIVE
T GONDII IGG SER QL: <3 IU/ML
T PALLIDUM AB SER QL IA: NEGATIVE
VZV AB TITR SER: NEGATIVE
VZV IGG SER IF-ACNC: 107.8 INDEX

## 2024-05-25 LAB
APPEARANCE: CLEAR
BILIRUBIN URINE: NEGATIVE
BLOOD URINE: NEGATIVE
CMV IGG SERPL QL: 6.1 U/ML
CMV IGG SERPL-IMP: POSITIVE
COLOR: YELLOW
GLUCOSE QUALITATIVE U: NEGATIVE
KETONES URINE: NEGATIVE
LEUKOCYTE ESTERASE URINE: NEGATIVE
NITRITE URINE: NEGATIVE
PH URINE: 7
PROTEIN URINE: ABNORMAL
SPECIFIC GRAVITY URINE: 1.02
UROBILINOGEN URINE: NORMAL

## 2024-05-28 LAB
M TB IFN-G BLD-IMP: NEGATIVE
QUANTIFERON TB PLUS MITOGEN MINUS NIL: 7.72 IU/ML
QUANTIFERON TB PLUS NIL: 0.41 IU/ML
QUANTIFERON TB PLUS TB1 MINUS NIL: 0.03 IU/ML
QUANTIFERON TB PLUS TB2 MINUS NIL: 0 IU/ML

## 2024-07-11 ENCOUNTER — APPOINTMENT (OUTPATIENT)
Dept: CARDIOLOGY | Facility: CLINIC | Age: 69
End: 2024-07-11
Payer: COMMERCIAL

## 2024-07-11 ENCOUNTER — NON-APPOINTMENT (OUTPATIENT)
Age: 69
End: 2024-07-11

## 2024-07-11 VITALS
RESPIRATION RATE: 17 BRPM | WEIGHT: 202 LBS | SYSTOLIC BLOOD PRESSURE: 167 MMHG | HEART RATE: 89 BPM | BODY MASS INDEX: 29.83 KG/M2 | DIASTOLIC BLOOD PRESSURE: 80 MMHG | OXYGEN SATURATION: 98 %

## 2024-07-11 DIAGNOSIS — E78.5 HYPERLIPIDEMIA, UNSPECIFIED: ICD-10-CM

## 2024-07-11 DIAGNOSIS — I50.9 HEART FAILURE, UNSPECIFIED: ICD-10-CM

## 2024-07-11 DIAGNOSIS — I10 ESSENTIAL (PRIMARY) HYPERTENSION: ICD-10-CM

## 2024-07-11 DIAGNOSIS — Z01.818 ENCOUNTER FOR OTHER PREPROCEDURAL EXAMINATION: ICD-10-CM

## 2024-07-11 DIAGNOSIS — M25.562 PAIN IN LEFT KNEE: ICD-10-CM

## 2024-07-11 PROCEDURE — 93000 ELECTROCARDIOGRAM COMPLETE: CPT | Mod: NC

## 2024-07-11 PROCEDURE — G2211 COMPLEX E/M VISIT ADD ON: CPT

## 2024-07-11 PROCEDURE — 99214 OFFICE O/P EST MOD 30 MIN: CPT

## 2024-07-17 ENCOUNTER — NON-APPOINTMENT (OUTPATIENT)
Age: 69
End: 2024-07-17

## 2024-08-29 ENCOUNTER — APPOINTMENT (OUTPATIENT)
Dept: CARDIOLOGY | Facility: CLINIC | Age: 69
End: 2024-08-29
Payer: COMMERCIAL

## 2024-08-29 PROCEDURE — 78452 HT MUSCLE IMAGE SPECT MULT: CPT

## 2024-08-29 PROCEDURE — A9500: CPT

## 2024-08-29 PROCEDURE — 93015 CV STRESS TEST SUPVJ I&R: CPT

## 2024-12-17 NOTE — PROCEDURE NOTE - PROCEDURE FINDINGS AND DETAILS
"Physical Therapy Treatment    Patient Name: Jana Galloway  MRN: [id number]  Encounter date: 12/17/2024    Time Calculation  Start Time: 1545  Stop Time: 1626  Time Calculation (min): 41 min  PT Therapeutic Procedures Time Entry  Therapeutic Exercise Time Entry: 40    Visit # 6 of 10  Visits/Dates Authorized: PT AUTH RECEIVED FOR 20 VISITS FOR 53616, 35653, 39739, 84198, 24392, 21639 FROM 10/29/24 TO 12/31/24 AUTH # [id number]  JS    EVAL ONLY -  VU - AUTH REQ / $3500 DEDUCT MET / $5100 OOP not met / 90% COVERAGE / 30V pt/ot MAX / PER CONTIGO w/s / ds 10/28/24.     Current Problem:   Problem List Items Addressed This Visit             ICD-10-CM    Other intervertebral disc displacement, lumbar region M51.26    Radiculopathy, lumbar region M54.16         Precautions:    N/A       Subjective   General:    Patient reports she is just feeling some soreness in her back, no pain. Patient states she just feels out of shape.     Pre-Treatment Symptoms:   Pain Assessment: 0-10  0-10 (Numeric) Pain Score: 0 - No pain (sore)    Objective   Findings:                                                     Treatments:      Therapeutic Exercise 34351:   TM fw 2 mph 3 min / retro 1.3 mph  2 min   Leg Curl 40-45# 2x12  Leg Extension 15# 2x12  L green TBSS 3 laps   L green fw/bw monster 3 laps   Shuttle leg press -112# 2x12 , SL 50# 1x12 R/L  Shuttle standing hip extension 12.5# 2x10 R/L   Bird dog 1x5 , 1x10 R/L   Forearm/knee plank 4 x 20\" holds  S/L clamshell L blue 2x15 R/L  S/L reverse clamshell pink 2x15 R/L    DNP:  Standing L pelvic shift + lumbar extension (heavy cues)   L green loop around feet march 2x10 R/L   Cable row 7.5# 2x10 with TA (heavy cues)  Cable straight arm pulldown 5# 2x10 with TA  Cable stirs 7.5# 1x10 cw/ccw R/L  Cable walkouts with unilateral anti-rotation 10# 1x5 R/L  Prone propping 1', prone press up x10  Hip flexor stretch at staircase  DL bridge with ball / blue band 1x12 each  SL bridge " "2x5 each R/L   Quadruped sit back to press up (pain-free ranges) 1x10  Carlos pose to R and L 1x10 ea  DL bridge x12 , with Tball x12   HL Tball abdominal iso 2x10  5\" holds  Supine 90/90 active HS stretch and nerve glide x12 R/L (helped reduce numbness in R shin)   Supine SKTC, piriformis stretch, figure 4 stretching 20\" holds R/L  HL glute sets 10x5\"  HL TA + BKFO x10 R/L  HL PPT (pain-free range)  Prone knee flexion x10 R/L  Lateral shift correction at wall (L elbow on wall, pushing pelvis to L) x10  Standing lumbar extension - with and without sustained R sidebend x10  Sit with a roll and improve rising  Prone on elbows 10 minutes ever 2 hours.      HEP / Access Codes:   Access Code: [email or web address] - Date: 11/08/2024  - Supine Single Knee to Chest Stretch  - 1 x daily - 2 sets - 20 seconds hold  - Supine Piriformis Stretch with Leg Straight  - 1 x daily - 2 sets - 20 seconds hold  - Supine Figure 4 Piriformis Stretch  - 1 x daily - 2 sets - 20 seconds hold  - Supine Hamstring Stretch  - 1 x daily - 15 reps  - Hooklying Gluteal Sets  - 1 x daily - 15 reps - 5 seconds hold  - Bent Knee Fallouts with Alternating Legs  - 1 x daily - 10 reps  - Supine Posterior Pelvic Tilt  - 1 x daily - 15 reps - 5 seconds hold  - Supine Bridge  - 1 x daily - 2 sets - 10 reps  - Prone Knee Flexion AROM  - 1 x daily - 10-20 reps  - Prone Hip Internal and External Rotation AROM  - 1 x daily - 10-20 reps  - Quadruped Rock Back into Prone Press Up  - 1 x daily - 10 reps  - Lateral Shift Correction at Wall  - 1 x daily - 10 reps  - Standing Lumbar Extension  - 1 x daily - 10 reps  Added side stepping with light resistance band    Assessment:    Patient tolerated all exercises without complaint of pain in low back. Treatment focused on continued hip/core strengthening. Patient was feeling good at end of session, soreness subsided in low back.     Post-Treatment Symptoms:   Response to Interventions: Relief    Plan:    Progress as able "     Goals:   Active       PT Problem       PT Goal 1       Start:  10/30/24    Expected End:  01/27/25       1. Pt will improve Low back disability score to < 10% impairment  2. Pt will be able to work through full day without increased pain or radicular sx  3. Pt will be able to bend to allow her to put on shoes and sock, especially in am without increased pain or radicular sx  4. Pt will demonstrate good core stabilization with bending and lifting, and no increased sx  5. Pt will be independent with HEP                  Successful placement of tunneled HD catheter.

## 2025-02-24 ENCOUNTER — INPATIENT (INPATIENT)
Facility: HOSPITAL | Age: 70
LOS: 7 days | Discharge: HOME HEALTH | DRG: 700 | End: 2025-03-04
Attending: TRANSPLANT SURGERY | Admitting: TRANSPLANT SURGERY
Payer: COMMERCIAL

## 2025-02-24 VITALS
HEART RATE: 88 BPM | WEIGHT: 197.53 LBS | HEIGHT: 69 IN | TEMPERATURE: 98 F | SYSTOLIC BLOOD PRESSURE: 150 MMHG | DIASTOLIC BLOOD PRESSURE: 77 MMHG | RESPIRATION RATE: 18 BRPM | OXYGEN SATURATION: 94 %

## 2025-02-24 DIAGNOSIS — Z98.890 OTHER SPECIFIED POSTPROCEDURAL STATES: Chronic | ICD-10-CM

## 2025-02-24 DIAGNOSIS — Z94.0 KIDNEY TRANSPLANT STATUS: ICD-10-CM

## 2025-02-24 PROCEDURE — 93010 ELECTROCARDIOGRAM REPORT: CPT

## 2025-02-24 PROCEDURE — 71045 X-RAY EXAM CHEST 1 VIEW: CPT | Mod: 26

## 2025-02-24 RX ORDER — INFLUENZA A VIRUS A/IDAHO/07/2018 (H1N1) ANTIGEN (MDCK CELL DERIVED, PROPIOLACTONE INACTIVATED, INFLUENZA A VIRUS A/INDIANA/08/2018 (H3N2) ANTIGEN (MDCK CELL DERIVED, PROPIOLACTONE INACTIVATED), INFLUENZA B VIRUS B/SINGAPORE/INFTT-16-0610/2016 ANTIGEN (MDCK CELL DERIVED, PROPIOLACTONE INACTIVATED), INFLUENZA B VIRUS B/IOWA/06/2017 ANTIGEN (MDCK CELL DERIVED, PROPIOLACTONE INACTIVATED) 15; 15; 15; 15 UG/.5ML; UG/.5ML; UG/.5ML; UG/.5ML
0.5 INJECTION, SUSPENSION INTRAMUSCULAR ONCE
Refills: 0 | Status: DISCONTINUED | OUTPATIENT
Start: 2025-02-24 | End: 2025-02-24

## 2025-02-24 NOTE — PATIENT PROFILE ADULT - CONTRAINDICATIONS & PRECAUTIONS (SELECT ALL THAT APPLY)
Called to schedule physical with  due after 3/11/25, left message to call back at [phone]   
Left message for patient to call and schedule annual physical due 03/12/25 with dr. delaney.   
none...

## 2025-02-24 NOTE — PATIENT PROFILE ADULT - FALL HARM RISK - UNIVERSAL INTERVENTIONS
Bed in lowest position, wheels locked, appropriate side rails in place/Call bell, personal items and telephone in reach/Instruct patient to call for assistance before getting out of bed or chair/Non-slip footwear when patient is out of bed/[place] to call system/Physically safe environment - no spills, clutter or unnecessary equipment/Purposeful Proactive Rounding/Room/bathroom lighting operational, light cord in reach

## 2025-02-24 NOTE — H&P ADULT - NSHPLABSRESULTS_GEN_ALL_CORE
MEDICATIONS  (STANDING):    MEDICATIONS  (PRN):      PAST MEDICAL & SURGICAL HISTORY:  HTN (hypertension)      Type 2 diabetes mellitus      ESRD on dialysis  mwf      Dyslipidemia      S/P arteriovenous (AV) fistula creation          Vital Signs Last 24 Hrs  T(C): 36.7 (24 Feb 2025 22:52), Max: 36.7 (24 Feb 2025 22:52)  T(F): 98 (24 Feb 2025 22:52), Max: 98 (24 Feb 2025 22:52)  HR: 88 (24 Feb 2025 22:52) (88 - 88)  BP: 150/77 (24 Feb 2025 22:52) (150/77 - 150/77)  BP(mean): --  RR: 18 (24 Feb 2025 22:52) (18 - 18)  SpO2: 94% (24 Feb 2025 22:52) (94% - 94%)    Parameters below as of 24 Feb 2025 22:52  Patient On (Oxygen Delivery Method): room air        I&O's Summary

## 2025-02-24 NOTE — H&P ADULT - NSHPREVIEWOFSYSTEMS_GEN_ALL_CORE
Review of systems  Gen: No weight changes, fatigue, fevers/chills, weakness  Skin: No rashes  Head/Eyes/Ears/Mouth: No headache; Normal hearing; Normal vision w/o blurriness; No sinus pain/discomfort, sore throat  Respiratory: No dyspnea, cough, wheezing, hemoptysis  CV: No chest pain, PND, orthopnea  GI: No abdominal pain; denies diarrhea, constipation, nausea, vomiting, melena, hematochezia  : No increased frequency, dysuria, hematuria, nocturia  MSK: No joint pain/swelling; no back pain; no edema  Neuro: No dizziness/lightheadedness, weakness, seizures, numbness, tingling  Heme: No easy bruising or bleeding  Endo: No heat/cold intolerance  Psych: No significant nervousness, anxiety, stress, depression  All other systems were reviewed and are negative, except as noted. 13-Nov-2018 03:46

## 2025-02-24 NOTE — H&P ADULT - HISTORY OF PRESENT ILLNESS
70 y/o M PMHX DM (retinopathy,neuropathy), HTN, anemia, chronic CHF, TTE w/ grade II diastolic dysfunction mild/mod MR and mild TR (2023), HLD, and CKD ESRD on HD since Jan 2023 via LUE AVF that presents as a direct admit for DDRT. Patient's last HD session was 2/24/2025 prior to admission, states he makes slightly more than 1 cup of urine per day. Patient denies any acute complaints at this time, feels in his usual state of health.

## 2025-02-24 NOTE — H&P ADULT - ASSESSMENT
68 y/o M PMHX DM (retinopathy,neuropathy), HTN, anemia, chronic CHF, TTE w/ grade II diastolic dysfunction mild/mod MR and mild TR (2023), HLD, and CKD ESRD on HD since Jan 2023 via LUE AVF that presents as a direct admit for DDRT. Patient's last HD session was 2/24/2025 prior to admission, states he makes slightly more than 1 cup of urine per day. Patient denies any acute complaints at this time, feels in his usual state of health.       [] DDRT  - preop labs, vital signs, CXR, EKG, COVID swab  - preop antibiotics 2g ancef  - Thymoglobulin induction/solumedrol 500  - Consents x2   - last HD was om 2/24/2025

## 2025-02-24 NOTE — H&P ADULT - NS ATTEND AMEND GEN_ALL_CORE FT
Patient seen and examined. He is admitted for a DDKT. He has ESRD and is on chronic HD.  Plan for Thymoglobulin for induction. Tac, MMF, and prednisone for maintenance.  Consents are signed.  Patient is marked.

## 2025-02-24 NOTE — H&P ADULT - NSHPPHYSICALEXAM_GEN_ALL_CORE
PHYSICAL EXAM:  Constitutional: Well developed / well nourished  Eyes: Anicteric, PERRLA  ENMT: nc/at  Neck: supple   Respiratory: CTA B/L, regular depth and effort  Cardiovascular: RR, + audible murmur  Gastrointestinal: Soft, non distended, nontender   Genitourinary: voiding spontaneously   Extremities: warm b/l upper and lower, +thrill LUEAVF   Vascular: Palpable dp pulses bilaterally  Neurological: A&O x3  Skin: no rashes, ulcerations or lesions;  Musculoskeletal: Moving all extremities  Psychiatric: Responsive

## 2025-02-25 ENCOUNTER — APPOINTMENT (OUTPATIENT)
Dept: TRANSPLANT | Facility: HOSPITAL | Age: 70
End: 2025-02-25

## 2025-02-25 DIAGNOSIS — N18.6 END STAGE RENAL DISEASE: ICD-10-CM

## 2025-02-25 LAB
ALBUMIN SERPL ELPH-MCNC: 3.4 G/DL — SIGNIFICANT CHANGE UP (ref 3.3–5)
ALBUMIN SERPL ELPH-MCNC: 3.9 G/DL — SIGNIFICANT CHANGE UP (ref 3.3–5)
ALBUMIN SERPL ELPH-MCNC: 4 G/DL — SIGNIFICANT CHANGE UP (ref 3.3–5)
ALP SERPL-CCNC: 100 U/L — SIGNIFICANT CHANGE UP (ref 40–120)
ALP SERPL-CCNC: 112 U/L — SIGNIFICANT CHANGE UP (ref 40–120)
ALP SERPL-CCNC: 87 U/L — SIGNIFICANT CHANGE UP (ref 40–120)
ALT FLD-CCNC: 32 U/L — SIGNIFICANT CHANGE UP (ref 10–45)
ALT FLD-CCNC: 47 U/L — HIGH (ref 10–45)
ALT FLD-CCNC: 6 U/L — LOW (ref 10–45)
ANION GAP SERPL CALC-SCNC: 17 MMOL/L — SIGNIFICANT CHANGE UP (ref 5–17)
ANION GAP SERPL CALC-SCNC: 20 MMOL/L — HIGH (ref 5–17)
ANION GAP SERPL CALC-SCNC: 20 MMOL/L — HIGH (ref 5–17)
APTT BLD: 28.7 SEC — SIGNIFICANT CHANGE UP (ref 24.5–35.6)
AST SERPL-CCNC: 13 U/L — SIGNIFICANT CHANGE UP (ref 10–40)
AST SERPL-CCNC: 48 U/L — HIGH (ref 10–40)
AST SERPL-CCNC: 56 U/L — HIGH (ref 10–40)
BASOPHILS # BLD AUTO: 0.04 K/UL — SIGNIFICANT CHANGE UP (ref 0–0.2)
BASOPHILS NFR BLD AUTO: 0.9 % — SIGNIFICANT CHANGE UP (ref 0–2)
BILIRUB SERPL-MCNC: 0.2 MG/DL — SIGNIFICANT CHANGE UP (ref 0.2–1.2)
BILIRUB SERPL-MCNC: 0.3 MG/DL — SIGNIFICANT CHANGE UP (ref 0.2–1.2)
BILIRUB SERPL-MCNC: 0.3 MG/DL — SIGNIFICANT CHANGE UP (ref 0.2–1.2)
BLD GP AB SCN SERPL QL: NEGATIVE — SIGNIFICANT CHANGE UP
BUN SERPL-MCNC: 31 MG/DL — HIGH (ref 7–23)
BUN SERPL-MCNC: 43 MG/DL — HIGH (ref 7–23)
BUN SERPL-MCNC: 48 MG/DL — HIGH (ref 7–23)
CALCIUM SERPL-MCNC: 8.4 MG/DL — SIGNIFICANT CHANGE UP (ref 8.4–10.5)
CALCIUM SERPL-MCNC: 8.6 MG/DL — SIGNIFICANT CHANGE UP (ref 8.4–10.5)
CALCIUM SERPL-MCNC: 8.6 MG/DL — SIGNIFICANT CHANGE UP (ref 8.4–10.5)
CHLORIDE SERPL-SCNC: 95 MMOL/L — LOW (ref 96–108)
CHLORIDE SERPL-SCNC: 95 MMOL/L — LOW (ref 96–108)
CHLORIDE SERPL-SCNC: 96 MMOL/L — SIGNIFICANT CHANGE UP (ref 96–108)
CO2 SERPL-SCNC: 24 MMOL/L — SIGNIFICANT CHANGE UP (ref 22–31)
CO2 SERPL-SCNC: 24 MMOL/L — SIGNIFICANT CHANGE UP (ref 22–31)
CO2 SERPL-SCNC: 29 MMOL/L — SIGNIFICANT CHANGE UP (ref 22–31)
CREAT SERPL-MCNC: 10.28 MG/DL — HIGH (ref 0.5–1.3)
CREAT SERPL-MCNC: 8 MG/DL — HIGH (ref 0.5–1.3)
CREAT SERPL-MCNC: 9.5 MG/DL — HIGH (ref 0.5–1.3)
EGFR: 5 ML/MIN/1.73M2 — LOW
EGFR: 5 ML/MIN/1.73M2 — LOW
EGFR: 6 ML/MIN/1.73M2 — LOW
EGFR: 6 ML/MIN/1.73M2 — LOW
EGFR: 7 ML/MIN/1.73M2 — LOW
EGFR: 7 ML/MIN/1.73M2 — LOW
EOSINOPHIL # BLD AUTO: 0.28 K/UL — SIGNIFICANT CHANGE UP (ref 0–0.5)
EOSINOPHIL NFR BLD AUTO: 6.2 % — HIGH (ref 0–6)
FLUAV AG NPH QL: SIGNIFICANT CHANGE UP
FLUBV AG NPH QL: SIGNIFICANT CHANGE UP
GAS PNL BLDA: SIGNIFICANT CHANGE UP
GAS PNL BLDV: SIGNIFICANT CHANGE UP
GAS PNL BLDV: SIGNIFICANT CHANGE UP
GLUCOSE BLDC GLUCOMTR-MCNC: 145 MG/DL — HIGH (ref 70–99)
GLUCOSE BLDC GLUCOMTR-MCNC: 191 MG/DL — HIGH (ref 70–99)
GLUCOSE BLDC GLUCOMTR-MCNC: 93 MG/DL — SIGNIFICANT CHANGE UP (ref 70–99)
GLUCOSE SERPL-MCNC: 180 MG/DL — HIGH (ref 70–99)
GLUCOSE SERPL-MCNC: 211 MG/DL — HIGH (ref 70–99)
GLUCOSE SERPL-MCNC: 230 MG/DL — HIGH (ref 70–99)
HBV CORE AB SER-ACNC: REACTIVE
HBV SURFACE AB SER-ACNC: 11.3 MIU/ML — LOW
HBV SURFACE AG SER-ACNC: SIGNIFICANT CHANGE UP
HCT VFR BLD CALC: 29.6 % — LOW (ref 39–50)
HCT VFR BLD CALC: 31.2 % — LOW (ref 39–50)
HCT VFR BLD CALC: 31.9 % — LOW (ref 39–50)
HCV AB S/CO SERPL IA: 0.05 S/CO — SIGNIFICANT CHANGE UP
HCV AB SERPL-IMP: SIGNIFICANT CHANGE UP
HCV RNA SPEC NAA+PROBE-LOG IU: SIGNIFICANT CHANGE UP
HCV RNA SPEC NAA+PROBE-LOG IU: SIGNIFICANT CHANGE UP LOGIU/ML
HGB BLD-MCNC: 10.1 G/DL — LOW (ref 13–17)
HGB BLD-MCNC: 10.4 G/DL — LOW (ref 13–17)
HGB BLD-MCNC: 9.6 G/DL — LOW (ref 13–17)
HIV 1+2 AB+HIV1 P24 AG SERPL QL IA: SIGNIFICANT CHANGE UP
IMM GRANULOCYTES NFR BLD AUTO: 0.2 % — SIGNIFICANT CHANGE UP (ref 0–0.9)
INR BLD: 0.95 RATIO — SIGNIFICANT CHANGE UP (ref 0.85–1.16)
LYMPHOCYTES # BLD AUTO: 0.73 K/UL — LOW (ref 1–3.3)
LYMPHOCYTES # BLD AUTO: 16.1 % — SIGNIFICANT CHANGE UP (ref 13–44)
MAGNESIUM SERPL-MCNC: 2 MG/DL — SIGNIFICANT CHANGE UP (ref 1.6–2.6)
MAGNESIUM SERPL-MCNC: 2.2 MG/DL — SIGNIFICANT CHANGE UP (ref 1.6–2.6)
MAGNESIUM SERPL-MCNC: 2.2 MG/DL — SIGNIFICANT CHANGE UP (ref 1.6–2.6)
MCHC RBC-ENTMCNC: 30.7 PG — SIGNIFICANT CHANGE UP (ref 27–34)
MCHC RBC-ENTMCNC: 31 PG — SIGNIFICANT CHANGE UP (ref 27–34)
MCHC RBC-ENTMCNC: 31 PG — SIGNIFICANT CHANGE UP (ref 27–34)
MCHC RBC-ENTMCNC: 32.4 G/DL — SIGNIFICANT CHANGE UP (ref 32–36)
MCHC RBC-ENTMCNC: 32.4 G/DL — SIGNIFICANT CHANGE UP (ref 32–36)
MCHC RBC-ENTMCNC: 32.6 G/DL — SIGNIFICANT CHANGE UP (ref 32–36)
MCV RBC AUTO: 94.8 FL — SIGNIFICANT CHANGE UP (ref 80–100)
MCV RBC AUTO: 95.2 FL — SIGNIFICANT CHANGE UP (ref 80–100)
MCV RBC AUTO: 95.5 FL — SIGNIFICANT CHANGE UP (ref 80–100)
MONOCYTES # BLD AUTO: 0.31 K/UL — SIGNIFICANT CHANGE UP (ref 0–0.9)
MONOCYTES NFR BLD AUTO: 6.8 % — SIGNIFICANT CHANGE UP (ref 2–14)
NEUTROPHILS # BLD AUTO: 3.16 K/UL — SIGNIFICANT CHANGE UP (ref 1.8–7.4)
NEUTROPHILS NFR BLD AUTO: 69.8 % — SIGNIFICANT CHANGE UP (ref 43–77)
NRBC BLD AUTO-RTO: 0 /100 WBCS — SIGNIFICANT CHANGE UP (ref 0–0)
PHOSPHATE SERPL-MCNC: 6.5 MG/DL — HIGH (ref 2.5–4.5)
PHOSPHATE SERPL-MCNC: 6.8 MG/DL — HIGH (ref 2.5–4.5)
PHOSPHATE SERPL-MCNC: 8 MG/DL — HIGH (ref 2.5–4.5)
PLATELET # BLD AUTO: 101 K/UL — LOW (ref 150–400)
PLATELET # BLD AUTO: 145 K/UL — LOW (ref 150–400)
PLATELET # BLD AUTO: 99 K/UL — LOW (ref 150–400)
POTASSIUM SERPL-MCNC: 3.6 MMOL/L — SIGNIFICANT CHANGE UP (ref 3.5–5.3)
POTASSIUM SERPL-MCNC: 4.4 MMOL/L — SIGNIFICANT CHANGE UP (ref 3.5–5.3)
POTASSIUM SERPL-MCNC: 4.4 MMOL/L — SIGNIFICANT CHANGE UP (ref 3.5–5.3)
POTASSIUM SERPL-SCNC: 3.6 MMOL/L — SIGNIFICANT CHANGE UP (ref 3.5–5.3)
POTASSIUM SERPL-SCNC: 4.4 MMOL/L — SIGNIFICANT CHANGE UP (ref 3.5–5.3)
POTASSIUM SERPL-SCNC: 4.4 MMOL/L — SIGNIFICANT CHANGE UP (ref 3.5–5.3)
PROT SERPL-MCNC: 6.7 G/DL — SIGNIFICANT CHANGE UP (ref 6–8.3)
PROT SERPL-MCNC: 7.3 G/DL — SIGNIFICANT CHANGE UP (ref 6–8.3)
PROT SERPL-MCNC: 7.7 G/DL — SIGNIFICANT CHANGE UP (ref 6–8.3)
PROTHROM AB SERPL-ACNC: 10.8 SEC — SIGNIFICANT CHANGE UP (ref 9.9–13.4)
RBC # BLD: 3.1 M/UL — LOW (ref 4.2–5.8)
RBC # BLD: 3.29 M/UL — LOW (ref 4.2–5.8)
RBC # BLD: 3.35 M/UL — LOW (ref 4.2–5.8)
RBC # FLD: 13.4 % — SIGNIFICANT CHANGE UP (ref 10.3–14.5)
RBC # FLD: 13.5 % — SIGNIFICANT CHANGE UP (ref 10.3–14.5)
RBC # FLD: 13.7 % — SIGNIFICANT CHANGE UP (ref 10.3–14.5)
RH IG SCN BLD-IMP: POSITIVE — SIGNIFICANT CHANGE UP
RH IG SCN BLD-IMP: POSITIVE — SIGNIFICANT CHANGE UP
RSV RNA NPH QL NAA+NON-PROBE: SIGNIFICANT CHANGE UP
SARS-COV-2 RNA SPEC QL NAA+PROBE: SIGNIFICANT CHANGE UP
SODIUM SERPL-SCNC: 139 MMOL/L — SIGNIFICANT CHANGE UP (ref 135–145)
SODIUM SERPL-SCNC: 139 MMOL/L — SIGNIFICANT CHANGE UP (ref 135–145)
SODIUM SERPL-SCNC: 142 MMOL/L — SIGNIFICANT CHANGE UP (ref 135–145)
WBC # BLD: 4.53 K/UL — SIGNIFICANT CHANGE UP (ref 3.8–10.5)
WBC # BLD: 7.25 K/UL — SIGNIFICANT CHANGE UP (ref 3.8–10.5)
WBC # BLD: 8.99 K/UL — SIGNIFICANT CHANGE UP (ref 3.8–10.5)
WBC # FLD AUTO: 4.53 K/UL — SIGNIFICANT CHANGE UP (ref 3.8–10.5)
WBC # FLD AUTO: 7.25 K/UL — SIGNIFICANT CHANGE UP (ref 3.8–10.5)
WBC # FLD AUTO: 8.99 K/UL — SIGNIFICANT CHANGE UP (ref 3.8–10.5)

## 2025-02-25 PROCEDURE — 50360 RNL ALTRNSPLJ W/O RCP NFRCT: CPT

## 2025-02-25 PROCEDURE — 76776 US EXAM K TRANSPL W/DOPPLER: CPT | Mod: 26,RT

## 2025-02-25 PROCEDURE — 71045 X-RAY EXAM CHEST 1 VIEW: CPT | Mod: 26

## 2025-02-25 PROCEDURE — 93010 ELECTROCARDIOGRAM REPORT: CPT

## 2025-02-25 DEVICE — KIT A-LINE 1LUM 20G X 12CM SAFE KIT: Type: IMPLANTABLE DEVICE | Site: RIGHT | Status: FUNCTIONAL

## 2025-02-25 DEVICE — STENT URET POLARIS ULTRA 5X12CM: Type: IMPLANTABLE DEVICE | Site: RIGHT | Status: FUNCTIONAL

## 2025-02-25 RX ORDER — SODIUM CHLORIDE 9 G/1000ML
1000 INJECTION, SOLUTION INTRAVENOUS
Refills: 0 | Status: DISCONTINUED | OUTPATIENT
Start: 2025-02-25 | End: 2025-03-04

## 2025-02-25 RX ORDER — DEXTROSE 50 % IN WATER 50 %
15 SYRINGE (ML) INTRAVENOUS ONCE
Refills: 0 | Status: DISCONTINUED | OUTPATIENT
Start: 2025-02-25 | End: 2025-03-04

## 2025-02-25 RX ORDER — ACETAMINOPHEN 500 MG/5ML
1000 LIQUID (ML) ORAL ONCE
Refills: 0 | Status: DISCONTINUED | OUTPATIENT
Start: 2025-02-25 | End: 2025-02-26

## 2025-02-25 RX ORDER — HYDROMORPHONE/SOD CHLOR,ISO/PF 2 MG/10 ML
0.5 SYRINGE (ML) INJECTION
Refills: 0 | Status: DISCONTINUED | OUTPATIENT
Start: 2025-02-25 | End: 2025-02-25

## 2025-02-25 RX ORDER — NICARDIPINE HCL 30 MG
5 CAPSULE ORAL
Qty: 40 | Refills: 0 | Status: DISCONTINUED | OUTPATIENT
Start: 2025-02-25 | End: 2025-02-26

## 2025-02-25 RX ORDER — GLUCAGON 3 MG/1
1 POWDER NASAL ONCE
Refills: 0 | Status: DISCONTINUED | OUTPATIENT
Start: 2025-02-25 | End: 2025-03-04

## 2025-02-25 RX ORDER — VALGANCICLOVIR 450 MG/1
450 TABLET, FILM COATED ORAL
Refills: 0 | Status: DISCONTINUED | OUTPATIENT
Start: 2025-02-26 | End: 2025-03-04

## 2025-02-25 RX ORDER — DEXTROSE 50 % IN WATER 50 %
25 SYRINGE (ML) INTRAVENOUS ONCE
Refills: 0 | Status: DISCONTINUED | OUTPATIENT
Start: 2025-02-25 | End: 2025-03-04

## 2025-02-25 RX ORDER — DEXTROSE 50 % IN WATER 50 %
12.5 SYRINGE (ML) INTRAVENOUS ONCE
Refills: 0 | Status: DISCONTINUED | OUTPATIENT
Start: 2025-02-25 | End: 2025-03-04

## 2025-02-25 RX ORDER — ONDANSETRON HCL/PF 4 MG/2 ML
4 VIAL (ML) INJECTION ONCE
Refills: 0 | Status: DISCONTINUED | OUTPATIENT
Start: 2025-02-25 | End: 2025-02-26

## 2025-02-25 RX ORDER — CEFAZOLIN SODIUM IN 0.9 % NACL 3 G/100 ML
2000 INTRAVENOUS SOLUTION, PIGGYBACK (ML) INTRAVENOUS ONCE
Refills: 0 | Status: COMPLETED | OUTPATIENT
Start: 2025-02-25 | End: 2025-02-25

## 2025-02-25 RX ORDER — TRAMADOL HYDROCHLORIDE 50 MG/1
50 TABLET, FILM COATED ORAL EVERY 8 HOURS
Refills: 0 | Status: DISCONTINUED | OUTPATIENT
Start: 2025-02-25 | End: 2025-03-04

## 2025-02-25 RX ORDER — MYCOPHENOLATE MOFETIL 500 MG/1
1 TABLET, FILM COATED ORAL EVERY 12 HOURS
Refills: 0 | Status: DISCONTINUED | OUTPATIENT
Start: 2025-02-26 | End: 2025-03-02

## 2025-02-25 RX ORDER — SODIUM CHLORIDE 9 G/1000ML
1000 INJECTION, SOLUTION INTRAVENOUS
Refills: 0 | Status: DISCONTINUED | OUTPATIENT
Start: 2025-02-25 | End: 2025-02-27

## 2025-02-25 RX ORDER — SULFAMETHOXAZOLE/TRIMETHOPRIM 800-160 MG
1 TABLET ORAL DAILY
Refills: 0 | Status: DISCONTINUED | OUTPATIENT
Start: 2025-02-26 | End: 2025-02-26

## 2025-02-25 RX ORDER — INSULIN LISPRO 100 U/ML
INJECTION, SOLUTION INTRAVENOUS; SUBCUTANEOUS EVERY 6 HOURS
Refills: 0 | Status: DISCONTINUED | OUTPATIENT
Start: 2025-02-25 | End: 2025-03-03

## 2025-02-25 RX ORDER — TRAMADOL HYDROCHLORIDE 50 MG/1
25 TABLET, FILM COATED ORAL EVERY 8 HOURS
Refills: 0 | Status: DISCONTINUED | OUTPATIENT
Start: 2025-02-25 | End: 2025-03-04

## 2025-02-25 RX ADMIN — Medication 0.5 MILLIGRAM(S): at 19:40

## 2025-02-25 RX ADMIN — Medication 500000 UNIT(S): at 23:15

## 2025-02-25 RX ADMIN — SODIUM CHLORIDE 70 MILLILITER(S): 9 INJECTION, SOLUTION INTRAVENOUS at 18:26

## 2025-02-25 RX ADMIN — SODIUM CHLORIDE 70 MILLILITER(S): 9 INJECTION, SOLUTION INTRAVENOUS at 19:30

## 2025-02-25 RX ADMIN — Medication 0.5 MILLIGRAM(S): at 19:25

## 2025-02-25 RX ADMIN — SODIUM CHLORIDE 50 MILLILITER(S): 9 INJECTION, SOLUTION INTRAVENOUS at 21:55

## 2025-02-25 RX ADMIN — INSULIN LISPRO 2: 100 INJECTION, SOLUTION INTRAVENOUS; SUBCUTANEOUS at 23:22

## 2025-02-25 RX ADMIN — Medication 25 MG/HR: at 18:25

## 2025-02-25 RX ADMIN — Medication 0.5 MILLIGRAM(S): at 17:15

## 2025-02-25 RX ADMIN — Medication 0.5 MILLIGRAM(S): at 17:30

## 2025-02-25 NOTE — BRIEF OPERATIVE NOTE - OPERATION/FINDINGS
S/P DDRT: Right Kidney transplanted on the RLQ under steroid and Thymo induction; ureteral stent x 1 placed    CIT 36 hours 30 min    1V / 2A anastomosed to single patch/ 1 U (stented)    1 JULES drain beside the transplanted kidney  1 SC JULES drain

## 2025-02-25 NOTE — PROGRESS NOTE ADULT - ASSESSMENT
[] POD#0 DDRT  - trend labs vital signs  - strict I's & O's, IVF maintenance & repalcement, carlo, JPx2  - Renal Doppler: patent, small collection 4x2 cm  - ADAT  - SCDs/PT/IS  - HTN: off cardene gtt at this time, resume BP meds w/ goal BP systolic 120-170s  - Transfer to floor pending PACU course 68 y/o M PMHX DM (retinopathy,neuropathy), HTN, anemia, chronic CHF, TTE w/ grade II diastolic dysfunction mild/mod MR and mild TR (2023), HLD, and CKD ESRD on HD since Jan 2023 via LUE AVF that presents as a direct admit for DDRT. Patient's last HD session was 2/24/2025 prior to admission, states he makes slightly more than 1 cup of urine per day. Patient denies any acute complaints at this time, feels in his usual state of health.     Patient is now s/p DDRT 2a on patch/1v/1u + stent on 2/25/2025 under Thymoglobulin induction      [] POD#0 DDRT  - trend labs vital signs  - strict I's & O's, IVF maintenance & repalcement, carlo, JPx2  - Renal Doppler: patent, small collection 4x2 cm  - ADAT  - SCDs/PT/IS  - HTN: off cardene gtt at this time, resume home BP meds w/ goal BP systolic 120-170s  - Transfer to floor pending PACU course

## 2025-02-25 NOTE — BRIEF OPERATIVE NOTE - NSICDXBRIEFPROCEDURE_GEN_ALL_CORE_FT
PROCEDURES:  Transplant, kidney, cadaveric 25-Feb-2025 16:55:12  Erna Naidu  Placement of ureteral stent 25-Feb-2025 16:55:26  Erna Naidu

## 2025-02-25 NOTE — PROGRESS NOTE ADULT - SUBJECTIVE AND OBJECTIVE BOX
Transplant Surgery -Post Op Check  --------------------------------------------------------------   Tx Date:  2/25/2025       POD#0        HPI:     Interval Events:      Immunosupression:  Induction: Thymo  Maintenance: FK/MMF/SST  Ongoing monitoring for signs of rejection     Potential Discharge date: TBD  Education:  Medications  Plan of care:  See Below    MEDICATIONS  (STANDING):  acetaminophen   IVPB .. 1000 milliGRAM(s) IV Intermittent once  chlorhexidine 2% Cloths 1 Application(s) Topical daily  dextrose 5%. 1000 milliLiter(s) (100 mL/Hr) IV Continuous <Continuous>  dextrose 5%. 1000 milliLiter(s) (50 mL/Hr) IV Continuous <Continuous>  dextrose 50% Injectable 25 Gram(s) IV Push once  dextrose 50% Injectable 12.5 Gram(s) IV Push once  dextrose 50% Injectable 25 Gram(s) IV Push once  glucagon  Injectable 1 milliGRAM(s) IntraMuscular once  insulin lispro (ADMELOG) corrective regimen sliding scale   SubCutaneous every 6 hours  multiple electrolytes Injection Type 1 1000 milliLiter(s) (70 mL/Hr) IV Continuous <Continuous>  multiple electrolytes Injection Type 1 1000 milliLiter(s) (50 mL/Hr) IV Continuous <Continuous>  niCARdipine Infusion 5 mG/Hr (25 mL/Hr) IV Continuous <Continuous>    MEDICATIONS  (PRN):  dextrose Oral Gel 15 Gram(s) Oral once PRN Blood Glucose LESS THAN 70 milliGRAM(s)/deciliter  HYDROmorphone  Injectable 0.5 milliGRAM(s) IV Push every 10 minutes PRN Moderate Pain (4 - 6)  ondansetron Injectable 4 milliGRAM(s) IV Push once PRN Nausea and/or Vomiting  traMADol 25 milliGRAM(s) Oral every 8 hours PRN Moderate Pain (4 - 6)  traMADol 50 milliGRAM(s) Oral every 8 hours PRN Severe Pain (7 - 10)      PAST MEDICAL & SURGICAL HISTORY:  HTN (hypertension)      Type 2 diabetes mellitus      ESRD on dialysis  mwf      Dyslipidemia      S/P arteriovenous (AV) fistula creation          Vital Signs Last 24 Hrs  T(C): 36.2 (25 Feb 2025 20:00), Max: 36.7 (24 Feb 2025 22:52)  T(F): 97.2 (25 Feb 2025 20:00), Max: 98.1 (25 Feb 2025 04:32)  HR: 98 (25 Feb 2025 21:45) (77 - 107)  BP: 168/76 (25 Feb 2025 19:00) (150/77 - 186/82)  BP(mean): 109 (25 Feb 2025 19:00) (109 - 118)  RR: 15 (25 Feb 2025 21:45) (14 - 18)  SpO2: 100% (25 Feb 2025 21:45) (94% - 100%)    Parameters below as of 25 Feb 2025 20:00  Patient On (Oxygen Delivery Method): nasal cannula  O2 Flow (L/min): 2      I&O's Summary    24 Feb 2025 07:01  -  25 Feb 2025 07:00  --------------------------------------------------------  IN: 0 mL / OUT: 200 mL / NET: -200 mL    25 Feb 2025 07:01  -  25 Feb 2025 22:14  --------------------------------------------------------  IN: 654 mL / OUT: 288 mL / NET: 366 mL                              10.4   7.25  )-----------( 99       ( 25 Feb 2025 17:24 )             31.9     02-25    139  |  95[L]  |  43[H]  ----------------------------<  230[H]  4.4   |  24  |  9.50[H]    Ca    8.6      25 Feb 2025 17:24  Phos  6.8     02-25  Mg     2.2     02-25    TPro  7.3  /  Alb  3.9  /  TBili  0.3  /  DBili  x   /  AST  56[H]  /  ALT  47[H]  /  AlkPhos  100  02-25            Review of systems  Gen: No weight changes, fatigue, fevers/chills, weakness  Skin: No rashes  Head/Eyes/Ears/Mouth: No headache; Normal hearing; Normal vision w/o blurriness; No sinus pain/discomfort, sore throat  Respiratory: No dyspnea, cough, wheezing, hemoptysis  CV: No chest pain, PND, orthopnea  GI: Mild abdominal pain at surgical incision site; denies diarrhea, constipation, nausea, vomiting, melena, hematochezia  : No increased frequency, dysuria, hematuria, nocturia  MSK: No joint pain/swelling; no back pain; no edema  Neuro: No dizziness/lightheadedness, weakness, seizures, numbness, tingling  Heme: No easy bruising or bleeding  Endo: No heat/cold intolerance  Psych: No significant nervousness, anxiety, stress, depression  All other systems were reviewed and are negative, except as noted.      PHYSICAL EXAM:  Constitutional: Well developed / well nourished  Eyes: Anicteric, PERRLA  ENMT: nc/at  Neck: supple  Respiratory: CTA B/L  Cardiovascular: RRR  Gastrointestinal: Soft, non distended, mild tenderness at the incision site; incision c/d/i; JULES x2 both ss  Genitourinary: Urinary catheter in place  Extremities: SCD's in place and working bilaterally, AVF LUE  Vascular: Palpable dp pulses bilaterally  Neurological: A&O x3  Skin: no rashes, ulcerations or lesions;  Musculoskeletal: Moving all extremities  Psychiatric: Responsive     Transplant Surgery -Post Op Check  --------------------------------------------------------------   Tx Date:  2/25/2025       POD#0    HPI: 68 y/o M PMHX DM (retinopathy,neuropathy), HTN, anemia, chronic CHF, TTE w/ grade II diastolic dysfunction mild/mod MR and mild TR (2023), HLD, and CKD ESRD on HD since Jan 2023 via LUE AVF that presents as a direct admit for DDRT. Patient's last HD session was 2/24/2025 prior to admission, states he makes slightly more than 1 cup of urine per day. Patient denies any acute complaints at this time, feels in his usual state of health.     Patient is now s/p DDRT 2a on patch/1v/1u + stent on 2/25/2025 under Thymoglobulin induction    Interval Events:  - s/p DDRT, 2a patch/1v/1u+stent to RLQ  - CIT 36 hrs, intraop recieved 2.5 L crystalloid, EBL 300cc.  - JPx2 ss, matos   - no acute complaints     Immunosupression:  Induction: Thymo  Maintenance: FK/MMF/SST  Ongoing monitoring for signs of rejection     Potential Discharge date: TBD  Education:  Medications  Plan of care:  See Below    MEDICATIONS  (STANDING):  acetaminophen   IVPB .. 1000 milliGRAM(s) IV Intermittent once  chlorhexidine 2% Cloths 1 Application(s) Topical daily  dextrose 5%. 1000 milliLiter(s) (100 mL/Hr) IV Continuous <Continuous>  dextrose 5%. 1000 milliLiter(s) (50 mL/Hr) IV Continuous <Continuous>  dextrose 50% Injectable 25 Gram(s) IV Push once  dextrose 50% Injectable 12.5 Gram(s) IV Push once  dextrose 50% Injectable 25 Gram(s) IV Push once  glucagon  Injectable 1 milliGRAM(s) IntraMuscular once  insulin lispro (ADMELOG) corrective regimen sliding scale   SubCutaneous every 6 hours  multiple electrolytes Injection Type 1 1000 milliLiter(s) (70 mL/Hr) IV Continuous <Continuous>  multiple electrolytes Injection Type 1 1000 milliLiter(s) (50 mL/Hr) IV Continuous <Continuous>  niCARdipine Infusion 5 mG/Hr (25 mL/Hr) IV Continuous <Continuous>    MEDICATIONS  (PRN):  dextrose Oral Gel 15 Gram(s) Oral once PRN Blood Glucose LESS THAN 70 milliGRAM(s)/deciliter  HYDROmorphone  Injectable 0.5 milliGRAM(s) IV Push every 10 minutes PRN Moderate Pain (4 - 6)  ondansetron Injectable 4 milliGRAM(s) IV Push once PRN Nausea and/or Vomiting  traMADol 25 milliGRAM(s) Oral every 8 hours PRN Moderate Pain (4 - 6)  traMADol 50 milliGRAM(s) Oral every 8 hours PRN Severe Pain (7 - 10)      PAST MEDICAL & SURGICAL HISTORY:  HTN (hypertension)      Type 2 diabetes mellitus      ESRD on dialysis  mwf      Dyslipidemia      S/P arteriovenous (AV) fistula creation          Vital Signs Last 24 Hrs  T(C): 36.2 (25 Feb 2025 20:00), Max: 36.7 (24 Feb 2025 22:52)  T(F): 97.2 (25 Feb 2025 20:00), Max: 98.1 (25 Feb 2025 04:32)  HR: 98 (25 Feb 2025 21:45) (77 - 107)  BP: 168/76 (25 Feb 2025 19:00) (150/77 - 186/82)  BP(mean): 109 (25 Feb 2025 19:00) (109 - 118)  RR: 15 (25 Feb 2025 21:45) (14 - 18)  SpO2: 100% (25 Feb 2025 21:45) (94% - 100%)    Parameters below as of 25 Feb 2025 20:00  Patient On (Oxygen Delivery Method): nasal cannula  O2 Flow (L/min): 2      I&O's Summary    24 Feb 2025 07:01  -  25 Feb 2025 07:00  --------------------------------------------------------  IN: 0 mL / OUT: 200 mL / NET: -200 mL    25 Feb 2025 07:01  -  25 Feb 2025 22:14  --------------------------------------------------------  IN: 654 mL / OUT: 288 mL / NET: 366 mL                              10.4   7.25  )-----------( 99       ( 25 Feb 2025 17:24 )             31.9     02-25    139  |  95[L]  |  43[H]  ----------------------------<  230[H]  4.4   |  24  |  9.50[H]    Ca    8.6      25 Feb 2025 17:24  Phos  6.8     02-25  Mg     2.2     02-25    TPro  7.3  /  Alb  3.9  /  TBili  0.3  /  DBili  x   /  AST  56[H]  /  ALT  47[H]  /  AlkPhos  100  02-25            Review of systems  Gen: No weight changes, fatigue, fevers/chills, weakness  Skin: No rashes  Head/Eyes/Ears/Mouth: No headache; Normal hearing; Normal vision w/o blurriness; No sinus pain/discomfort, sore throat  Respiratory: No dyspnea, cough, wheezing, hemoptysis  CV: No chest pain, PND, orthopnea  GI: Mild abdominal pain at surgical incision site; denies diarrhea, constipation, nausea, vomiting, melena, hematochezia  : No increased frequency, dysuria, hematuria, nocturia  MSK: No joint pain/swelling; no back pain; no edema  Neuro: No dizziness/lightheadedness, weakness, seizures, numbness, tingling  Heme: No easy bruising or bleeding  Endo: No heat/cold intolerance  Psych: No significant nervousness, anxiety, stress, depression  All other systems were reviewed and are negative, except as noted.      PHYSICAL EXAM:  Constitutional: Well developed / well nourished  Eyes: Anicteric, PERRLA  ENMT: nc/at  Neck: supple  Respiratory: CTA B/L  Cardiovascular: RRR  Gastrointestinal: Soft, non distended, mild tenderness at the incision site; incision c/d/i; JULES x2  ss  Genitourinary: Urinary catheter in place  Extremities: SCD's in place and working bilaterally, AVF LUE  Vascular: Palpable dp pulses bilaterally  Neurological: A&O x3  Skin: no rashes, ulcerations or lesions;  Musculoskeletal: Moving all extremities  Psychiatric: Responsive

## 2025-02-26 DIAGNOSIS — Z94.0 KIDNEY TRANSPLANT STATUS: ICD-10-CM

## 2025-02-26 LAB
A1C WITH ESTIMATED AVERAGE GLUCOSE RESULT: 5.8 % — HIGH (ref 4–5.6)
A1C WITH ESTIMATED AVERAGE GLUCOSE RESULT: 5.9 % — HIGH (ref 4–5.6)
ALBUMIN SERPL ELPH-MCNC: 3.3 G/DL — SIGNIFICANT CHANGE UP (ref 3.3–5)
ALP SERPL-CCNC: 82 U/L — SIGNIFICANT CHANGE UP (ref 40–120)
ALT FLD-CCNC: 23 U/L — SIGNIFICANT CHANGE UP (ref 10–45)
ANION GAP SERPL CALC-SCNC: 21 MMOL/L — HIGH (ref 5–17)
AST SERPL-CCNC: 51 U/L — HIGH (ref 10–40)
BILIRUB SERPL-MCNC: 0.2 MG/DL — SIGNIFICANT CHANGE UP (ref 0.2–1.2)
BUN SERPL-MCNC: 52 MG/DL — HIGH (ref 7–23)
CALCIUM SERPL-MCNC: 8.4 MG/DL — SIGNIFICANT CHANGE UP (ref 8.4–10.5)
CHLORIDE SERPL-SCNC: 97 MMOL/L — SIGNIFICANT CHANGE UP (ref 96–108)
CO2 SERPL-SCNC: 23 MMOL/L — SIGNIFICANT CHANGE UP (ref 22–31)
CREAT SERPL-MCNC: 10.79 MG/DL — HIGH (ref 0.5–1.3)
EGFR: 5 ML/MIN/1.73M2 — LOW
EGFR: 5 ML/MIN/1.73M2 — LOW
ESTIMATED AVERAGE GLUCOSE: 120 MG/DL — HIGH (ref 68–114)
ESTIMATED AVERAGE GLUCOSE: 123 MG/DL — HIGH (ref 68–114)
GAS PNL BLDV: SIGNIFICANT CHANGE UP
GLUCOSE BLDC GLUCOMTR-MCNC: 111 MG/DL — HIGH (ref 70–99)
GLUCOSE BLDC GLUCOMTR-MCNC: 112 MG/DL — HIGH (ref 70–99)
GLUCOSE BLDC GLUCOMTR-MCNC: 151 MG/DL — HIGH (ref 70–99)
GLUCOSE BLDC GLUCOMTR-MCNC: 170 MG/DL — HIGH (ref 70–99)
GLUCOSE SERPL-MCNC: 140 MG/DL — HIGH (ref 70–99)
HCT VFR BLD CALC: 28.1 % — LOW (ref 39–50)
HGB BLD-MCNC: 8.9 G/DL — LOW (ref 13–17)
LACTATE SERPL-SCNC: 2.3 MMOL/L — HIGH (ref 0.5–2)
MAGNESIUM SERPL-MCNC: 2.2 MG/DL — SIGNIFICANT CHANGE UP (ref 1.6–2.6)
MCHC RBC-ENTMCNC: 29.9 PG — SIGNIFICANT CHANGE UP (ref 27–34)
MCHC RBC-ENTMCNC: 31.7 G/DL — LOW (ref 32–36)
MCV RBC AUTO: 94.3 FL — SIGNIFICANT CHANGE UP (ref 80–100)
NRBC BLD AUTO-RTO: 0 /100 WBCS — SIGNIFICANT CHANGE UP (ref 0–0)
PHOSPHATE SERPL-MCNC: 8.1 MG/DL — HIGH (ref 2.5–4.5)
PLATELET # BLD AUTO: 102 K/UL — LOW (ref 150–400)
POTASSIUM SERPL-MCNC: 5.3 MMOL/L — SIGNIFICANT CHANGE UP (ref 3.5–5.3)
POTASSIUM SERPL-SCNC: 5.3 MMOL/L — SIGNIFICANT CHANGE UP (ref 3.5–5.3)
PROT SERPL-MCNC: 6.6 G/DL — SIGNIFICANT CHANGE UP (ref 6–8.3)
RBC # BLD: 2.98 M/UL — LOW (ref 4.2–5.8)
RBC # FLD: 13.9 % — SIGNIFICANT CHANGE UP (ref 10.3–14.5)
SODIUM SERPL-SCNC: 141 MMOL/L — SIGNIFICANT CHANGE UP (ref 135–145)
WBC # BLD: 7.11 K/UL — SIGNIFICANT CHANGE UP (ref 3.8–10.5)
WBC # FLD AUTO: 7.11 K/UL — SIGNIFICANT CHANGE UP (ref 3.8–10.5)

## 2025-02-26 PROCEDURE — 99222 1ST HOSP IP/OBS MODERATE 55: CPT | Mod: GC

## 2025-02-26 RX ORDER — TACROLIMUS 1 MG/1
1 TABLET, EXTENDED RELEASE ORAL
Qty: 90 | Refills: 11 | Status: ACTIVE | COMMUNITY
Start: 2025-02-26 | End: 1900-01-01

## 2025-02-26 RX ORDER — ACETAMINOPHEN 500 MG/5ML
1000 LIQUID (ML) ORAL ONCE
Refills: 0 | Status: COMPLETED | OUTPATIENT
Start: 2025-02-26 | End: 2025-02-26

## 2025-02-26 RX ORDER — FUROSEMIDE 10 MG/ML
80 INJECTION INTRAMUSCULAR; INTRAVENOUS ONCE
Refills: 0 | Status: COMPLETED | OUTPATIENT
Start: 2025-02-26 | End: 2025-02-26

## 2025-02-26 RX ORDER — METHYLPREDNISOLONE ACETATE 80 MG/ML
250 INJECTION, SUSPENSION INTRA-ARTICULAR; INTRALESIONAL; INTRAMUSCULAR; SOFT TISSUE ONCE
Refills: 0 | Status: COMPLETED | OUTPATIENT
Start: 2025-02-26 | End: 2025-02-26

## 2025-02-26 RX ORDER — MYCOPHENOLATE MOFETIL 500 MG/1
500 TABLET ORAL TWICE DAILY
Qty: 120 | Refills: 11 | Status: ACTIVE | COMMUNITY
Start: 2025-02-26 | End: 1900-01-01

## 2025-02-26 RX ORDER — ACETAMINOPHEN 500 MG/5ML
650 LIQUID (ML) ORAL ONCE
Refills: 0 | Status: COMPLETED | OUTPATIENT
Start: 2025-02-26 | End: 2025-02-26

## 2025-02-26 RX ORDER — PREDNISONE 5 MG/1
5 TABLET ORAL
Qty: 41 | Refills: 0 | Status: ACTIVE | COMMUNITY
Start: 2025-02-26 | End: 1900-01-01

## 2025-02-26 RX ORDER — SODIUM ZIRCONIUM CYCLOSILICATE 10 G/10G
10 POWDER, FOR SUSPENSION ORAL DAILY
Qty: 30 | Refills: 11 | Status: ACTIVE | COMMUNITY
Start: 2025-02-26 | End: 1900-01-01

## 2025-02-26 RX ORDER — FUROSEMIDE 10 MG/ML
80 INJECTION INTRAMUSCULAR; INTRAVENOUS ONCE
Refills: 0 | Status: DISCONTINUED | OUTPATIENT
Start: 2025-02-26 | End: 2025-02-27

## 2025-02-26 RX ORDER — VALGANCICLOVIR HYDROCHLORIDE 450 MG/1
450 TABLET ORAL
Qty: 30 | Refills: 5 | Status: ACTIVE | COMMUNITY
Start: 2025-02-26 | End: 1900-01-01

## 2025-02-26 RX ORDER — NIFEDIPINE 30 MG
30 TABLET, EXTENDED RELEASE 24 HR ORAL ONCE
Refills: 0 | Status: COMPLETED | OUTPATIENT
Start: 2025-02-26 | End: 2025-02-26

## 2025-02-26 RX ORDER — FAMOTIDINE 20 MG/1
20 TABLET, FILM COATED ORAL
Qty: 30 | Refills: 11 | Status: ACTIVE | COMMUNITY
Start: 2025-02-26 | End: 1900-01-01

## 2025-02-26 RX ORDER — POLYETHYLENE GLYCOL 3350 17 G/17G
17 POWDER, FOR SOLUTION ORAL DAILY
Refills: 0 | Status: DISCONTINUED | OUTPATIENT
Start: 2025-02-26 | End: 2025-03-04

## 2025-02-26 RX ORDER — NIFEDIPINE 30 MG
30 TABLET, EXTENDED RELEASE 24 HR ORAL DAILY
Refills: 0 | Status: DISCONTINUED | OUTPATIENT
Start: 2025-02-26 | End: 2025-02-26

## 2025-02-26 RX ORDER — SENNOSIDES 8.6 MG/1
8.6 CAPSULE, GELATIN COATED ORAL
Qty: 30 | Refills: 11 | Status: ACTIVE | COMMUNITY
Start: 2025-02-26 | End: 1900-01-01

## 2025-02-26 RX ORDER — TACROLIMUS 0.5 MG/1
4 CAPSULE ORAL
Refills: 0 | Status: DISCONTINUED | OUTPATIENT
Start: 2025-02-27 | End: 2025-02-28

## 2025-02-26 RX ORDER — NIFEDIPINE 30 MG/1
30 TABLET, FILM COATED, EXTENDED RELEASE ORAL
Qty: 30 | Refills: 11 | Status: ACTIVE | COMMUNITY
Start: 2025-02-26 | End: 1900-01-01

## 2025-02-26 RX ORDER — TACROLIMUS 0.5 MG/1
4 CAPSULE ORAL ONCE
Refills: 0 | Status: COMPLETED | OUTPATIENT
Start: 2025-02-26 | End: 2025-02-26

## 2025-02-26 RX ORDER — SODIUM CHLORIDE 9 G/1000ML
500 INJECTION, SOLUTION INTRAVENOUS ONCE
Refills: 0 | Status: COMPLETED | OUTPATIENT
Start: 2025-02-26 | End: 2025-02-26

## 2025-02-26 RX ORDER — PREDNISONE 5 MG/1
5 TABLET ORAL
Qty: 30 | Refills: 11 | Status: ACTIVE | COMMUNITY
Start: 2025-02-26 | End: 1900-01-01

## 2025-02-26 RX ORDER — SULFAMETHOXAZOLE AND TRIMETHOPRIM 400; 80 MG/1; MG/1
400-80 TABLET ORAL
Qty: 30 | Refills: 11 | Status: ACTIVE | COMMUNITY
Start: 2025-02-26 | End: 1900-01-01

## 2025-02-26 RX ORDER — DIPHENHYDRAMINE HCL 12.5MG/5ML
50 ELIXIR ORAL ONCE
Refills: 0 | Status: COMPLETED | OUTPATIENT
Start: 2025-02-26 | End: 2025-02-26

## 2025-02-26 RX ORDER — NYSTATIN 100000 [USP'U]/ML
100000 SUSPENSION ORAL
Qty: 150 | Refills: 0 | Status: ACTIVE | COMMUNITY
Start: 2025-02-26 | End: 1900-01-01

## 2025-02-26 RX ORDER — TACROLIMUS 4 MG/1
4 TABLET, EXTENDED RELEASE ORAL
Qty: 90 | Refills: 11 | Status: ACTIVE | COMMUNITY
Start: 2025-02-26 | End: 1900-01-01

## 2025-02-26 RX ADMIN — Medication 10 MILLIGRAM(S): at 21:03

## 2025-02-26 RX ADMIN — SODIUM CHLORIDE 70 MILLILITER(S): 9 INJECTION, SOLUTION INTRAVENOUS at 16:53

## 2025-02-26 RX ADMIN — Medication 1000 MILLIGRAM(S): at 05:32

## 2025-02-26 RX ADMIN — Medication 500000 UNIT(S): at 11:28

## 2025-02-26 RX ADMIN — FUROSEMIDE 80 MILLIGRAM(S): 10 INJECTION INTRAMUSCULAR; INTRAVENOUS at 06:05

## 2025-02-26 RX ADMIN — TRAMADOL HYDROCHLORIDE 50 MILLIGRAM(S): 50 TABLET, FILM COATED ORAL at 02:46

## 2025-02-26 RX ADMIN — TRAMADOL HYDROCHLORIDE 25 MILLIGRAM(S): 50 TABLET, FILM COATED ORAL at 07:57

## 2025-02-26 RX ADMIN — Medication 650 MILLIGRAM(S): at 16:52

## 2025-02-26 RX ADMIN — Medication 1000 MILLIGRAM(S): at 22:22

## 2025-02-26 RX ADMIN — Medication 500000 UNIT(S): at 05:13

## 2025-02-26 RX ADMIN — POLYETHYLENE GLYCOL 3350 17 GRAM(S): 17 POWDER, FOR SOLUTION ORAL at 11:29

## 2025-02-26 RX ADMIN — Medication 1 APPLICATION(S): at 11:30

## 2025-02-26 RX ADMIN — INSULIN LISPRO 2: 100 INJECTION, SOLUTION INTRAVENOUS; SUBCUTANEOUS at 06:09

## 2025-02-26 RX ADMIN — METHYLPREDNISOLONE ACETATE 100 MILLIGRAM(S): 80 INJECTION, SUSPENSION INTRA-ARTICULAR; INTRALESIONAL; INTRAMUSCULAR; SOFT TISSUE at 16:52

## 2025-02-26 RX ADMIN — Medication 30 MILLIGRAM(S): at 22:02

## 2025-02-26 RX ADMIN — Medication 20 MILLIGRAM(S): at 11:28

## 2025-02-26 RX ADMIN — Medication 500000 UNIT(S): at 16:55

## 2025-02-26 RX ADMIN — TRAMADOL HYDROCHLORIDE 50 MILLIGRAM(S): 50 TABLET, FILM COATED ORAL at 01:46

## 2025-02-26 RX ADMIN — TACROLIMUS 4 MILLIGRAM(S): 0.5 CAPSULE ORAL at 11:28

## 2025-02-26 RX ADMIN — SODIUM CHLORIDE 1000 MILLILITER(S): 9 INJECTION, SOLUTION INTRAVENOUS at 05:58

## 2025-02-26 RX ADMIN — Medication 50 MILLIGRAM(S): at 16:52

## 2025-02-26 RX ADMIN — MYCOPHENOLATE MOFETIL 1 GRAM(S): 500 TABLET, FILM COATED ORAL at 19:50

## 2025-02-26 RX ADMIN — TRAMADOL HYDROCHLORIDE 25 MILLIGRAM(S): 50 TABLET, FILM COATED ORAL at 08:30

## 2025-02-26 RX ADMIN — TRAMADOL HYDROCHLORIDE 50 MILLIGRAM(S): 50 TABLET, FILM COATED ORAL at 16:12

## 2025-02-26 RX ADMIN — VALGANCICLOVIR 450 MILLIGRAM(S): 450 TABLET, FILM COATED ORAL at 07:57

## 2025-02-26 RX ADMIN — TRAMADOL HYDROCHLORIDE 50 MILLIGRAM(S): 50 TABLET, FILM COATED ORAL at 17:08

## 2025-02-26 RX ADMIN — Medication 400 MILLIGRAM(S): at 22:02

## 2025-02-26 RX ADMIN — MYCOPHENOLATE MOFETIL 1 GRAM(S): 500 TABLET, FILM COATED ORAL at 07:57

## 2025-02-26 RX ADMIN — Medication 400 MILLIGRAM(S): at 05:12

## 2025-02-26 NOTE — PROVIDER CONTACT NOTE (OTHER) - SITUATION
Pt c.o headache during thymo, thymo started less than 2 hours ago. Pt c.o headache during thymo, thymo started at 1730

## 2025-02-26 NOTE — PROVIDER CONTACT NOTE (OTHER) - ASSESSMENT
Pt resting in bed. UOP as charted. BP elevated, c.o headache Pt resting in bed. UOP as charted. BP elevated, c.o headache.

## 2025-02-26 NOTE — PROGRESS NOTE ADULT - SUBJECTIVE AND OBJECTIVE BOX
Transplant Surgery -Post Op Check  --------------------------------------------------------------   Tx Date:  2/25/2025       POD#1    HPI: 70 y/o M PMHX DM (retinopathy,neuropathy), HTN, anemia, chronic CHF, TTE w/ grade II diastolic dysfunction mild/mod MR and mild TR (2023), HLD, and CKD ESRD on HD since Jan 2023 via LUE AVF that presents as a direct admit for DDRT. Patient's last HD session was 2/24/2025 prior to admission, states he makes slightly more than 1 cup of urine per day. Patient denies any acute complaints at this time, feels in his usual state of health.     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 1000 milliLiter(s) (50 mL/Hr) IV Continuous <Continuous>  dextrose 5%. 1000 milliLiter(s) (100 mL/Hr) IV Continuous <Continuous>  dextrose 50% Injectable 25 Gram(s) IV Push once  dextrose 50% Injectable 12.5 Gram(s) IV Push once  dextrose 50% Injectable 25 Gram(s) IV Push once  famotidine    Tablet 20 milliGRAM(s) Oral daily  glucagon  Injectable 1 milliGRAM(s) IntraMuscular once  insulin lispro (ADMELOG) corrective regimen sliding scale   SubCutaneous every 6 hours  multiple electrolytes Injection Type 1 1000 milliLiter(s) (70 mL/Hr) IV Continuous <Continuous>  multiple electrolytes Injection Type 1 1000 milliLiter(s) (50 mL/Hr) IV Continuous <Continuous>  mycophenolate mofetil 1 Gram(s) Oral every 12 hours  NIFEdipine XL 30 milliGRAM(s) Oral daily  nystatin    Suspension 354430 Unit(s) Swish and Swallow four times a day  polyethylene glycol 3350 17 Gram(s) Oral daily  tacrolimus ER Tablet (ENVARSUS XR) 4 milliGRAM(s) Oral once  valGANciclovir 450 milliGRAM(s) Oral <User Schedule>    MEDICATIONS  (PRN):  dextrose Oral Gel 15 Gram(s) Oral once PRN Blood Glucose LESS THAN 70 milliGRAM(s)/deciliter  HYDROmorphone  Injectable 0.5 milliGRAM(s) IV Push every 10 minutes PRN Moderate Pain (4 - 6)  traMADol 25 milliGRAM(s) Oral every 8 hours PRN Moderate Pain (4 - 6)  traMADol 50 milliGRAM(s) Oral every 8 hours PRN Severe Pain (7 - 10)      PAST MEDICAL & SURGICAL HISTORY:  HTN (hypertension)      Type 2 diabetes mellitus      ESRD on dialysis  mwf      Dyslipidemia      S/P arteriovenous (AV) fistula creation          Vital Signs Last 24 Hrs  T(C): 36.8 (26 Feb 2025 09:00), Max: 37.4 (26 Feb 2025 00:00)  T(F): 98.2 (26 Feb 2025 09:00), Max: 99.3 (26 Feb 2025 00:00)  HR: 77 (26 Feb 2025 08:00) (77 - 107)  BP: 120/63 (26 Feb 2025 08:00) (115/57 - 186/82)  BP(mean): 84 (26 Feb 2025 08:00) (80 - 118)  RR: 18 (26 Feb 2025 08:00) (14 - 18)  SpO2: 100% (26 Feb 2025 08:00) (91% - 100%)    Parameters below as of 26 Feb 2025 08:00  Patient On (Oxygen Delivery Method): room air        I&O's Summary    25 Feb 2025 07:01  -  26 Feb 2025 07:00  --------------------------------------------------------  IN: 1644 mL / OUT: 478 mL / NET: 1166 mL    26 Feb 2025 07:01  -  26 Feb 2025 11:15  --------------------------------------------------------  IN: 140 mL / OUT: 60 mL / NET: 80 mL                              8.9    7.11  )-----------( 102      ( 26 Feb 2025 05:09 )             28.1     02-26    141  |  97  |  52[H]  ----------------------------<  140[H]  5.3   |  23  |  10.79[H]    Ca    8.4      26 Feb 2025 05:09  Phos  8.1     02-26  Mg     2.2     02-26    TPro  6.6  /  Alb  3.3  /  TBili  0.2  /  DBili  x   /  AST  51[H]  /  ALT  23  /  AlkPhos  82  02-26      Review of systems  Gen: No weight changes, fatigue, fevers/chills, weakness  Skin: No rashes  Head/Eyes/Ears/Mouth: No headache; Normal hearing; Normal vision w/o blurriness; No sinus pain/discomfort, sore throat  Respiratory: No dyspnea, cough, wheezing, hemoptysis  CV: No chest pain, PND, orthopnea  GI: Mild abdominal pain at surgical incision site; denies diarrhea, constipation, nausea, vomiting, melena, hematochezia  : No increased frequency, dysuria, hematuria, nocturia  MSK: No joint pain/swelling; no back pain; no edema  Neuro: No dizziness/lightheadedness, weakness, seizures, numbness, tingling  Heme: No easy bruising or bleeding  Endo: No heat/cold intolerance  Psych: No significant nervousness, anxiety, stress, depression  All other systems were reviewed and are negative, except as noted.      PHYSICAL EXAM:  Constitutional: Well developed / well nourished  Eyes: Anicteric, PERRLA  ENMT: nc/at  Neck: supple  Respiratory: CTA B/L  Cardiovascular: RRR  Gastrointestinal: Soft, non distended, mild tenderness at the incision site; incision c/d/i; JULES x2  ss  Genitourinary: Urinary catheter in place  Extremities: SCD's in place and working bilaterally, AVF LUE  Vascular: Palpable dp pulses bilaterally  Neurological: A&O x3  Skin: no rashes, ulcerations or lesions;  Musculoskeletal: Moving all extremities  Psychiatric: Responsive     Transplant Surgery -Post Op Check  --------------------------------------------------------------   Tx Date:  2/25/2025       POD#1    HPI: 68 y/o M PMHX DM (retinopathy,neuropathy), HTN, anemia, chronic CHF, TTE w/ grade II diastolic dysfunction mild/mod MR and mild TR (2023), HLD, and CKD ESRD on HD since Jan 2023 via LUE AVF that presents as a direct admit for DDRT. Patient's last HD session was 2/24/2025 prior to admission, states he makes slightly more than 1 cup of urine per day. Patient denies any acute complaints at this time, feels in his usual state of health.     Patient is now s/p DDRT 2a on patch/1v/1u + stent on 2/25/2025 under Thymoglobulin induction    Interval Events:  - s/p POD#1 DDRT  -SGF  -Lactate 2.3 ON, 80mg IV Lasix + 500cc bolus given  -HTN ON, Nifedipine 30mg started    Immunosupression:  Induction: Thymo  Maintenance: FK/MMF/SST  Ongoing monitoring for signs of rejection     Potential Discharge date: TBD  Education:  Medications  Plan of care:  See Below    MEDICATIONS  (STANDING):  chlorhexidine 2% Cloths 1 Application(s) Topical daily  dextrose 5%. 1000 milliLiter(s) (50 mL/Hr) IV Continuous <Continuous>  dextrose 5%. 1000 milliLiter(s) (100 mL/Hr) IV Continuous <Continuous>  dextrose 50% Injectable 25 Gram(s) IV Push once  dextrose 50% Injectable 12.5 Gram(s) IV Push once  dextrose 50% Injectable 25 Gram(s) IV Push once  famotidine    Tablet 20 milliGRAM(s) Oral daily  glucagon  Injectable 1 milliGRAM(s) IntraMuscular once  insulin lispro (ADMELOG) corrective regimen sliding scale   SubCutaneous every 6 hours  multiple electrolytes Injection Type 1 1000 milliLiter(s) (70 mL/Hr) IV Continuous <Continuous>  multiple electrolytes Injection Type 1 1000 milliLiter(s) (50 mL/Hr) IV Continuous <Continuous>  mycophenolate mofetil 1 Gram(s) Oral every 12 hours  NIFEdipine XL 30 milliGRAM(s) Oral daily  nystatin    Suspension 096646 Unit(s) Swish and Swallow four times a day  polyethylene glycol 3350 17 Gram(s) Oral daily  tacrolimus ER Tablet (ENVARSUS XR) 4 milliGRAM(s) Oral once  valGANciclovir 450 milliGRAM(s) Oral <User Schedule>    MEDICATIONS  (PRN):  dextrose Oral Gel 15 Gram(s) Oral once PRN Blood Glucose LESS THAN 70 milliGRAM(s)/deciliter  HYDROmorphone  Injectable 0.5 milliGRAM(s) IV Push every 10 minutes PRN Moderate Pain (4 - 6)  traMADol 25 milliGRAM(s) Oral every 8 hours PRN Moderate Pain (4 - 6)  traMADol 50 milliGRAM(s) Oral every 8 hours PRN Severe Pain (7 - 10)      PAST MEDICAL & SURGICAL HISTORY:  HTN (hypertension)      Type 2 diabetes mellitus      ESRD on dialysis  mwf      Dyslipidemia      S/P arteriovenous (AV) fistula creation          Vital Signs Last 24 Hrs  T(C): 36.8 (26 Feb 2025 09:00), Max: 37.4 (26 Feb 2025 00:00)  T(F): 98.2 (26 Feb 2025 09:00), Max: 99.3 (26 Feb 2025 00:00)  HR: 77 (26 Feb 2025 08:00) (77 - 107)  BP: 120/63 (26 Feb 2025 08:00) (115/57 - 186/82)  BP(mean): 84 (26 Feb 2025 08:00) (80 - 118)  RR: 18 (26 Feb 2025 08:00) (14 - 18)  SpO2: 100% (26 Feb 2025 08:00) (91% - 100%)    Parameters below as of 26 Feb 2025 08:00  Patient On (Oxygen Delivery Method): room air        I&O's Summary    25 Feb 2025 07:01  -  26 Feb 2025 07:00  --------------------------------------------------------  IN: 1644 mL / OUT: 478 mL / NET: 1166 mL    26 Feb 2025 07:01  -  26 Feb 2025 11:15  --------------------------------------------------------  IN: 140 mL / OUT: 60 mL / NET: 80 mL                              8.9    7.11  )-----------( 102      ( 26 Feb 2025 05:09 )             28.1     02-26    141  |  97  |  52[H]  ----------------------------<  140[H]  5.3   |  23  |  10.79[H]    Ca    8.4      26 Feb 2025 05:09  Phos  8.1     02-26  Mg     2.2     02-26    TPro  6.6  /  Alb  3.3  /  TBili  0.2  /  DBili  x   /  AST  51[H]  /  ALT  23  /  AlkPhos  82  02-26      Review of systems  Gen: No weight changes, fatigue, fevers/chills, weakness  Skin: No rashes  Head/Eyes/Ears/Mouth: No headache; Normal hearing; Normal vision w/o blurriness; No sinus pain/discomfort, sore throat  Respiratory: No dyspnea, cough, wheezing, hemoptysis  CV: No chest pain, PND, orthopnea  GI: Mild abdominal pain at surgical incision site; denies diarrhea, constipation, nausea, vomiting, melena, hematochezia  : No increased frequency, dysuria, hematuria, nocturia  MSK: No joint pain/swelling; no back pain; no edema  Neuro: No dizziness/lightheadedness, weakness, seizures, numbness, tingling  Heme: No easy bruising or bleeding  Endo: No heat/cold intolerance  Psych: No significant nervousness, anxiety, stress, depression  All other systems were reviewed and are negative, except as noted.      PHYSICAL EXAM:  Constitutional: Well developed / well nourished  Eyes: Anicteric, PERRLA  ENMT: nc/at  Neck: supple  Respiratory: CTA B/L  Cardiovascular: RRR  Gastrointestinal: Soft, non distended, mild tenderness at the incision site; incision c/d/i; JULES x2  ss  Genitourinary: Urinary catheter in place  Extremities: SCD's in place and working bilaterally, AVF LUE  Vascular: Palpable dp pulses bilaterally  Neurological: A&O x3  Skin: no rashes, ulcerations or lesions;  Musculoskeletal: Moving all extremities  Psychiatric: Responsive     Transplant Surgery -Post Op Check  --------------------------------------------------------------   Tx Date:  2/25/2025       POD#1    HPI: 68 y/o M PMHX DM (retinopathy,neuropathy), HTN, anemia, chronic CHF, TTE w/ grade II diastolic dysfunction mild/mod MR and mild TR (2023), HLD, and CKD ESRD on HD since Jan 2023 via LUE AVF that presents as a direct admit for DDRT. Patient's last HD session was 2/24/2025 prior to admission, states he makes slightly more than 1 cup of urine per day. Patient denies any acute complaints at this time, feels in his usual state of health.     Patient is now s/p DDRT 2a on patch/1v/1u + stent on 2/25/2025 under Thymoglobulin induction    Interval Events:  - POD#1 DDRT  -Lactate 2.3 ON, s/p 80mg IV Lasix + 500cc bolus   -HTN ON, Nifedipine 30mg started    Immunosupression:  Induction: Thymo  Maintenance: FK/MMF/SST  Ongoing monitoring for signs of rejection     Potential Discharge date: TBD  Education:  Medications  Plan of care:  See Below    MEDICATIONS  (STANDING):  chlorhexidine 2% Cloths 1 Application(s) Topical daily  dextrose 5%. 1000 milliLiter(s) (50 mL/Hr) IV Continuous <Continuous>  dextrose 5%. 1000 milliLiter(s) (100 mL/Hr) IV Continuous <Continuous>  dextrose 50% Injectable 25 Gram(s) IV Push once  dextrose 50% Injectable 12.5 Gram(s) IV Push once  dextrose 50% Injectable 25 Gram(s) IV Push once  famotidine    Tablet 20 milliGRAM(s) Oral daily  glucagon  Injectable 1 milliGRAM(s) IntraMuscular once  insulin lispro (ADMELOG) corrective regimen sliding scale   SubCutaneous every 6 hours  multiple electrolytes Injection Type 1 1000 milliLiter(s) (70 mL/Hr) IV Continuous <Continuous>  multiple electrolytes Injection Type 1 1000 milliLiter(s) (50 mL/Hr) IV Continuous <Continuous>  mycophenolate mofetil 1 Gram(s) Oral every 12 hours  NIFEdipine XL 30 milliGRAM(s) Oral daily  nystatin    Suspension [id number] Unit(s) Swish and Swallow four times a day  polyethylene glycol 3350 17 Gram(s) Oral daily  tacrolimus ER Tablet (ENVARSUS XR) 4 milliGRAM(s) Oral once  valGANciclovir 450 milliGRAM(s) Oral <User Schedule>    MEDICATIONS  (PRN):  dextrose Oral Gel 15 Gram(s) Oral once PRN Blood Glucose LESS THAN 70 milliGRAM(s)/deciliter  HYDROmorphone  Injectable 0.5 milliGRAM(s) IV Push every 10 minutes PRN Moderate Pain (4 - 6)  traMADol 25 milliGRAM(s) Oral every 8 hours PRN Moderate Pain (4 - 6)  traMADol 50 milliGRAM(s) Oral every 8 hours PRN Severe Pain (7 - 10)      PAST MEDICAL & SURGICAL HISTORY:  HTN (hypertension)      Type 2 diabetes mellitus      ESRD on dialysis  mwf      Dyslipidemia      S/P arteriovenous (AV) fistula creation          Vital Signs Last 24 Hrs  T(C): 36.8 (26 Feb 2025 09:00), Max: 37.4 (26 Feb 2025 00:00)  T(F): 98.2 (26 Feb 2025 09:00), Max: 99.3 (26 Feb 2025 00:00)  HR: 77 (26 Feb 2025 08:00) (77 - 107)  BP: 120/63 (26 Feb 2025 08:00) (115/57 - 186/82)  BP(mean): 84 (26 Feb 2025 08:00) (80 - 118)  RR: 18 (26 Feb 2025 08:00) (14 - 18)  SpO2: 100% (26 Feb 2025 08:00) (91% - 100%)    Parameters below as of 26 Feb 2025 08:00  Patient On (Oxygen Delivery Method): room air        I&O's Summary    25 Feb 2025 07:01  -  26 Feb 2025 07:00  --------------------------------------------------------  IN: 1644 mL / OUT: 478 mL / NET: 1166 mL    26 Feb 2025 07:01  -  26 Feb 2025 11:15  --------------------------------------------------------  IN: 140 mL / OUT: 60 mL / NET: 80 mL                              8.9    7.11  )-----------( 102      ( 26 Feb 2025 05:09 )             28.1     02-26    141  |  97  |  52[H]  ----------------------------<  140[H]  5.3   |  23  |  10.79[H]    Ca    8.4      26 Feb 2025 05:09  Phos  8.1     02-26  Mg     2.2     02-26    TPro  6.6  /  Alb  3.3  /  TBili  0.2  /  DBili  x   /  AST  51[H]  /  ALT  23  /  AlkPhos  82  02-26      Review of systems  Gen: No weight changes, fatigue, fevers/chills, weakness  Skin: No rashes  Head/Eyes/Ears/Mouth: No headache; Normal hearing; Normal vision w/o blurriness; No sinus pain/discomfort, sore throat  Respiratory: No dyspnea, cough, wheezing, hemoptysis  CV: No chest pain, PND, orthopnea  GI: Mild abdominal pain at surgical incision site; denies diarrhea, constipation, nausea, vomiting, melena, hematochezia  : No increased frequency, dysuria, hematuria, nocturia  MSK: No joint pain/swelling; no back pain; no edema  Neuro: No dizziness/lightheadedness, weakness, seizures, numbness, tingling  Heme: No easy bruising or bleeding  Endo: No heat/cold intolerance  Psych: No significant nervousness, anxiety, stress, depression  All other systems were reviewed and are negative, except as noted.      PHYSICAL EXAM:  Constitutional: Well developed / well nourished  Eyes: Anicteric, PERRLA  ENMT: nc/at  Neck: supple  Respiratory: CTA B/L  Cardiovascular: RRR  Gastrointestinal: Soft, non distended, mild tenderness at the incision site; incision c/d/i; JULES x2  ss  Genitourinary: Urinary catheter in place  Extremities: SCD's in place and working bilaterally, AVF LUE  Vascular: Palpable dp pulses bilaterally  Neurological: A&O x3  Skin: no rashes, ulcerations or lesions;  Musculoskeletal: Moving all extremities  Psychiatric: Responsive

## 2025-02-26 NOTE — CONSULT NOTE ADULT - SUBJECTIVE AND OBJECTIVE BOX
[facility] DIVISION OF KIDNEY DISEASES AND HYPERTENSION -- INITIAL CONSULT NOTE  --------------------------------------------------------------------------------    HPI: 70 y/o M PMHX DM (retinopathy,neuropathy), HTN, anemia, chronic CHF, TTE w/ grade II diastolic dysfunction mild/mod MR and mild TR (2023), HLD, and CKD ESRD on HD since Jan 2023 via LUE AVF that presents as a direct admit for DDRT on 2/26. Transplant nephrology consulted for management of DDRT.     On review of [facility], Patient ESRD on Hemodialysis since Jan 2023 likely underlying kidney ds from long standing DKD. Patient's last HD session was 2/24/2025 prior to admission, states he makes slightly more than 1 cup of urine per day.     Patient underwent DDRt on 2/25/2025.   Right Kidney transplanted on the RLQ under steroid and Thymo induction;   ureteral stent x 1 placed; 1V / 2A anastomosed to single patch/ 1 U (stented)  CIT 36 hours 30 min    Patient seen and examined today. feeling well. No SOB CP NV or abdominal pain. Patient had some pain at the site of the drains but that resolved.     PAST HISTORY  --------------------------------------------------------------------------------  PAST MEDICAL & SURGICAL HISTORY:  HTN (hypertension)  Type 2 diabetes mellitus  ESRD on dialysis  mwf  Dyslipidemia  S/P arteriovenous (AV) fistula creation        FAMILY HISTORY:    Social History:      ALLERGIES & MEDICATIONS  --------------------------------------------------------------------------------  Allergies    No Known Allergies    Intolerances      Standing Inpatient Medications  acetaminophen     Tablet .. 650 milliGRAM(s) Oral once  antithymocyte globulin rabbit (peripheral) IVPB w/additives 150 milliGRAM(s) IV Intermittent once  chlorhexidine 2% Cloths 1 Application(s) Topical daily  dextrose 5%. 1000 milliLiter(s) IV Continuous <Continuous>  dextrose 5%. 1000 milliLiter(s) IV Continuous <Continuous>  dextrose 50% Injectable 25 Gram(s) IV Push once  dextrose 50% Injectable 12.5 Gram(s) IV Push once  dextrose 50% Injectable 25 Gram(s) IV Push once  diphenhydrAMINE Elixir 50 milliGRAM(s) Oral once  famotidine    Tablet 20 milliGRAM(s) Oral daily  furosemide   Injectable 80 milliGRAM(s) IV Push once  glucagon  Injectable 1 milliGRAM(s) IntraMuscular once  insulin lispro (ADMELOG) corrective regimen sliding scale   SubCutaneous every 6 hours  methylPREDNISolone sodium succinate IVPB 250 milliGRAM(s) IV Intermittent once  multiple electrolytes Injection Type 1 1000 milliLiter(s) IV Continuous <Continuous>  multiple electrolytes Injection Type 1 1000 milliLiter(s) IV Continuous <Continuous>  mycophenolate mofetil 1 Gram(s) Oral every 12 hours  NIFEdipine XL 30 milliGRAM(s) Oral daily  nystatin    Suspension 096520 Unit(s) Swish and Swallow four times a day  polyethylene glycol 3350 17 Gram(s) Oral daily  valGANciclovir 450 milliGRAM(s) Oral <User Schedule>    PRN Inpatient Medications  dextrose Oral Gel 15 Gram(s) Oral once PRN  HYDROmorphone  Injectable 0.5 milliGRAM(s) IV Push every 10 minutes PRN  traMADol 25 milliGRAM(s) Oral every 8 hours PRN  traMADol 50 milliGRAM(s) Oral every 8 hours PRN      REVIEW OF SYSTEMS  --------------------------------------------------------------------------------  Gen: No fatigue, fevers/chills  Skin: No rashes  Head/Eyes/Ears/Mouth: No headache; Normal hearing; Normal vision   Respiratory: No dyspnea, cough  CV: No chest pain, PND, orthopnea  GI: No abdominal pain, diarrhea, constipation, nausea, vomiting  : No increased frequency, dysuria, hematuria, nocturia  MSK: No edema  Neuro: No dizziness/lightheadedness  Heme: No bruising or bleeding  Psych: No significant nervousness, anxiety, stress, depression    Rest of the ROS as stated in HPI    VITALS/PHYSICAL EXAM  --------------------------------------------------------------------------------  T(C): 36.8 (02-26-25 @ 15:02), Max: 37.4 (02-26-25 @ 00:00)  HR: 77 (02-26-25 @ 15:02) (77 - 107)  BP: 137/62 (02-26-25 @ 15:02) (115/57 - 186/82)  RR: 18 (02-26-25 @ 15:02) (14 - 18)  SpO2: 99% (02-26-25 @ 15:02) (91% - 100%)  Wt(kg): --  Height (cm): 175.3 (02-25-25 @ 10:37)  Weight (kg): 89.6 (02-25-25 @ 10:37)  BMI (kg/m2): 29.2 (02-25-25 @ 10:37)  BSA (m2): 2.06 (02-25-25 @ 10:37)      02-25-25 @ 07:01  -  02-26-25 @ 07:00  --------------------------------------------------------  IN: 1644 mL / OUT: 478 mL / NET: 1166 mL    02-26-25 @ 07:01  -  02-26-25 @ 15:13  --------------------------------------------------------  IN: 560 mL / OUT: 106 mL / NET: 454 mL      Physical Exam:  Gen: NAD, able to speak in full sentences   HEENT: PERRL, MMM   Pulm: CTA B/L, no crackles   CV: RRR, S1S2+  Abd: +BS, soft  Transplant: No tenderness, swelling, 1 JULES drain and 1 SC drain in place   : No suprapubic tenderness, Last in place   MSK: no edema   Psych: Normal affect and mood  Skin: Warm  Access: Left AVF    LABS/STUDIES  --------------------------------------------------------------------------------              8.9    7.11  >-----------<  102      [02-26-25 @ 05:09]              28.1     141  |  97  |  52  ----------------------------<  140      [02-26-25 @ 05:09]  5.3   |  23  |  10.79        Ca     8.4     [02-26-25 @ 05:09]      Mg     2.2     [02-26-25 @ 05:09]      Phos  8.1     [02-26-25 @ 05:09]    TPro  6.6  /  Alb  3.3  /  TBili  0.2  /  DBili  x   /  AST  51  /  ALT  23  /  AlkPhos  82  [02-26-25 @ 05:09]    PT/INR: PT 10.8 , INR 0.95       [02-25-25 @ 00:34]  PTT: 28.7       [02-25-25 @ 00:34]      Creatinine Trend:  SCr 10.79 [02-26 @ 05:09]  SCr 10.28 [02-25 @ 23:00]  SCr 9.50 [02-25 @ 17:24]  SCr 8.00 [02-25 @ 00:34]    Urinalysis - [02-26-25 @ 05:09]      Color  / Appearance  / SG  / pH       Gluc 140 / Ketone   / Bili  / Urobili        Blood  / Protein  / Leuk Est  / Nitrite       RBC  / WBC  / Hyaline  / Gran  / Sq Epi  / Non Sq Epi  / Bacteria         HBsAb 11.3      [02-25-25 @ 00:35]  HBsAg Nonreact      [02-25-25 @ 00:35]  HBcAb Reactive      [02-25-25 @ 00:35]  HCV 0.05, Nonreact      [02-25-25 @ 00:35]  HIV Nonreact      [02-25-25 @ 00:34]    C3 Complement 118      [05-30-21 @ 21:20]  C4 Complement 28      [05-30-21 @ 21:20]  MPO-ANCA <5.0, interpretation: Negative      [05-30-21 @ 21:20]  PR3-ANCA <5.0, interpretation: Negative      [05-30-21 @ 21:20]  anti-GBM 3      [05-30-21 @ 22:52]  Free Light Chains: kappa 20.80, lambda 12.61, ratio = 1.65      [01-18 @ 05:40]  Immunofixation Serum: Weak IgG Kappa Band Identified    Reference Range: None Detected      [01-18-23 @ 05:40]  SPEP Interpretation: Weak Gamma-Migrating Paraprotein Identified      [01-18-23 @ 05:40]  Immunofixation Urine: Reference Range: None Detected      [05-29-21 @ 18:48]  UPEP Interpretation: Mild Selective (Glomerular) Proteinuria      [05-29-21 @ 18:48]    Tacrolimus  Cyclosporine  Sirolimus  Mycophenolate  BK PCR  CMV PCR  Parvo PCR  EBV PCR

## 2025-02-26 NOTE — CONSULT NOTE ADULT - ASSESSMENT
70 y/o M PMHX DM (retinopathy,neuropathy), HTN, anemia, chronic CHF, TTE w/ grade II diastolic dysfunction mild/mod MR and mild TR (2023), HLD, and CKD ESRD on HD since Jan 2023 via LUE AVF that presents as a direct admit for DDRT on 2/26. Transplant nephrology consulted for management of DDRT.     1. S/p DDRT:   Patient ESRD on Hemodialysis since Jan 2023 likely underlying kidney ds from long standing DKD. Patient's last HD session was 2/24/2025 prior to admission.  Patient underwent DDRt on 2/25/2025.   Right Kidney transplanted on the RLQ under steroid and Thymo induction;   ureteral stent x 1 placed; 1V / 2A anastomosed to single patch/ 1 U (stented)  CIT 36 hours 30 min  Scr elevated at 10.79, BUN 52 on 2/26. Electrolytes fair. Euvolemic on RA no pedal edema.  No urgent need for Hemodialysis today  Consent obtained from the patient and placed in the chart.  Patient with minimal UOp. Given lasix 80 mg IV overnight. Another dose to be given today.     Monitor labs and UOP. Avoid any potential nephrotoxins (NSAIDs/ACEi/ARBs) when possible. Dose medications as per eGFR/HD.     2. Immunosuppression   -Envarsus 4mg  -Monitor FK daily, per level moving forward  -MMF 1g PO BID  -Thymo 150mg / solumedrol 250mg today    3. HTN: Started on Nifedipine 30mg qd  4. DM: Insulin Admelog ISS

## 2025-02-26 NOTE — CONSULT NOTE ADULT - TIME BILLING
DDRT post op day 1, noted I/O. Clinically doing well.  Thymo induction  Comorbidities, immunosuppression, prophylaxis reviewed  Clinical, lab imaging data reviewed  Suggestions:  Continue Thymoglobulin dose per protocol  Tacrolimus target trough level 8-10 ng/ml  Continue MMF/Steroids  IV furosemide 80 mg as planned  Will follow an monitor for dialysis need  D/w transplant team  Will follow  I was present during and reviewed clinical and lab data as well as assessment and plan as documented by the house staff as noted. Please contact if any additional questions with any change in clinical condition or on availability of any additional information or reports.

## 2025-02-26 NOTE — PROVIDER CONTACT NOTE (CRITICAL VALUE NOTIFICATION) - BACKGROUND
70 y/o M PMHX DM (retinopathy,neuropathy), HTN, anemia, chronic CHF, TTE w/ grade II diastolic dysfunction mild/mod MR and mild TR (2023), HLD, and CKD ESRD on HD since Jan 2023 via LUE AV. DDRT POD 1

## 2025-02-26 NOTE — PROGRESS NOTE ADULT - ASSESSMENT
68 y/o M PMHX DM (retinopathy,neuropathy), HTN, anemia, chronic CHF, TTE w/ grade II diastolic dysfunction mild/mod MR and mild TR (2023), HLD, and CKD ESRD on HD since Jan 2023 via LUE AVF that presents as a direct admit for DDRT. Patient's last HD session was 2/24/2025 prior to admission, states he makes slightly more than 1 cup of urine per day. Patient denies any acute complaints at this time, feels in his usual state of health.     Patient is now s/p DDRT 2a on patch/1v/1u + stent on 2/25/2025 under Thymoglobulin induction      [] POD#1 DDRT  - trend labs vital signs  - strict I's & O's, IVF maintenance & repalcement, carlo, JPx2  - Renal Doppler: patent, small collection 4x2 cm  - ADAT  - SCDs/PT/IS  - Transfer to floor pending PACU course    [] HTN  off cardene gtt at this time, resume home BP meds w/ goal BP systolic 120-170s 68 y/o M PMHX DM (retinopathy,neuropathy), HTN, anemia, chronic CHF, TTE w/ grade II diastolic dysfunction mild/mod MR and mild TR (2023), HLD, and CKD ESRD on HD since Jan 2023 via LUE AVF that presents as a direct admit for DDRT. Patient's last HD session was 2/24/2025 prior to admission, states he makes slightly more than 1 cup of urine per day. Patient denies any acute complaints at this time, feels in his usual state of health.     Patient is now s/p DDRT 2a on patch/1v/1u + stent on 2/25/2025 under Thymoglobulin induction      [] POD#1 DDRT  -SGF  -Lasix 80mg IV x2  - trend labs vital signs  - strict I's & O's, IVF maintenance & repalcement, carlo, JPx2  - Renal Doppler: patent, small collection 4x2 cm  - ADAT  - SCDs/PT/IS  - Transfer to floor pending PACU course  -Envarsus 4mg  -Monitor FK daily, per level moving forward  -MMF 1g PO BID  -Thymo 150mg / solumedrol 250mg today    [] HTN  -c/w Nifedipine 30mg PO QD  -Goal -170 /     [] DM  -ISS  -Podiatry consult (pt to miss scheduled appt tomorrow as outpt)  -Monitor BGL 70 y/o M PMHX DM (retinopathy,neuropathy), HTN, anemia, chronic CHF, TTE w/ grade II diastolic dysfunction mild/mod MR and mild TR (2023), HLD, and CKD ESRD on HD since Jan 2023 via LUE AVF that presents as a direct admit for DDRT. Patient's last HD session was 2/24/2025 prior to admission, states he makes slightly more than 1 cup of urine per day. Patient denies any acute complaints at this time, feels in his usual state of health.     Patient is now s/p DDRT 2a on patch/1v/1u + stent on 2/25/2025 under Thymoglobulin induction      [] POD#1 DDRT  -SGF  -Lasix 80mg IV x2  - trend labs vital signs  - strict I's & O's, IVF maintenance & repalcement, carlo, JPx2  - Renal Doppler: patent, small collection 4x2 cm  - ADAT  - SCDs/PT/IS  - Transfer to floor pending PACU course    []immuno   -Envarsus 4mg, Monitor FK daily, per level moving forward, MMF 1/1  -Thymo 150mg / solumedrol 250mg today    [] HTN  -c/w Nifedipine 30mg PO QD  -Goal -170 /     [] DM  -ISS  -Podiatry consult (pt to miss scheduled appt tomorrow as outpt)  -Monitor BGL

## 2025-02-27 LAB
ALBUMIN SERPL ELPH-MCNC: 3.3 G/DL — SIGNIFICANT CHANGE UP (ref 3.3–5)
ALP SERPL-CCNC: 83 U/L — SIGNIFICANT CHANGE UP (ref 40–120)
ALT FLD-CCNC: 6 U/L — LOW (ref 10–45)
ANION GAP SERPL CALC-SCNC: 24 MMOL/L — HIGH (ref 5–17)
ANION GAP SERPL CALC-SCNC: 27 MMOL/L — HIGH (ref 5–17)
AST SERPL-CCNC: 31 U/L — SIGNIFICANT CHANGE UP (ref 10–40)
BILIRUB SERPL-MCNC: 0.2 MG/DL — SIGNIFICANT CHANGE UP (ref 0.2–1.2)
BUN SERPL-MCNC: 75 MG/DL — HIGH (ref 7–23)
BUN SERPL-MCNC: 84 MG/DL — HIGH (ref 7–23)
CALCIUM SERPL-MCNC: 8.8 MG/DL — SIGNIFICANT CHANGE UP (ref 8.4–10.5)
CALCIUM SERPL-MCNC: 9.2 MG/DL — SIGNIFICANT CHANGE UP (ref 8.4–10.5)
CHLORIDE SERPL-SCNC: 91 MMOL/L — LOW (ref 96–108)
CHLORIDE SERPL-SCNC: 94 MMOL/L — LOW (ref 96–108)
CO2 SERPL-SCNC: 18 MMOL/L — LOW (ref 22–31)
CO2 SERPL-SCNC: 21 MMOL/L — LOW (ref 22–31)
CREAT SERPL-MCNC: 12.94 MG/DL — HIGH (ref 0.5–1.3)
CREAT SERPL-MCNC: 13.07 MG/DL — HIGH (ref 0.5–1.3)
EGFR: 4 ML/MIN/1.73M2 — LOW
GLUCOSE BLDC GLUCOMTR-MCNC: 106 MG/DL — HIGH (ref 70–99)
GLUCOSE BLDC GLUCOMTR-MCNC: 193 MG/DL — HIGH (ref 70–99)
GLUCOSE BLDC GLUCOMTR-MCNC: 213 MG/DL — HIGH (ref 70–99)
GLUCOSE BLDC GLUCOMTR-MCNC: 225 MG/DL — HIGH (ref 70–99)
GLUCOSE BLDC GLUCOMTR-MCNC: 244 MG/DL — HIGH (ref 70–99)
GLUCOSE BLDC GLUCOMTR-MCNC: 250 MG/DL — HIGH (ref 70–99)
GLUCOSE SERPL-MCNC: 185 MG/DL — HIGH (ref 70–99)
GLUCOSE SERPL-MCNC: 248 MG/DL — HIGH (ref 70–99)
HCT VFR BLD CALC: 27.6 % — LOW (ref 39–50)
HGB BLD-MCNC: 8.7 G/DL — LOW (ref 13–17)
LACTATE SERPL-SCNC: 1.2 MMOL/L — SIGNIFICANT CHANGE UP (ref 0.5–2)
MAGNESIUM SERPL-MCNC: 2.5 MG/DL — SIGNIFICANT CHANGE UP (ref 1.6–2.6)
MCHC RBC-ENTMCNC: 30 PG — SIGNIFICANT CHANGE UP (ref 27–34)
MCHC RBC-ENTMCNC: 31.5 G/DL — LOW (ref 32–36)
MCV RBC AUTO: 95.2 FL — SIGNIFICANT CHANGE UP (ref 80–100)
NRBC BLD AUTO-RTO: 0 /100 WBCS — SIGNIFICANT CHANGE UP (ref 0–0)
PHOSPHATE SERPL-MCNC: 9.8 MG/DL — HIGH (ref 2.5–4.5)
PLATELET # BLD AUTO: 76 K/UL — LOW (ref 150–400)
POTASSIUM SERPL-MCNC: 5.2 MMOL/L — SIGNIFICANT CHANGE UP (ref 3.5–5.3)
POTASSIUM SERPL-MCNC: 5.9 MMOL/L — HIGH (ref 3.5–5.3)
POTASSIUM SERPL-SCNC: 5.2 MMOL/L — SIGNIFICANT CHANGE UP (ref 3.5–5.3)
POTASSIUM SERPL-SCNC: 5.9 MMOL/L — HIGH (ref 3.5–5.3)
PROT SERPL-MCNC: 6.5 G/DL — SIGNIFICANT CHANGE UP (ref 6–8.3)
RBC # BLD: 2.9 M/UL — LOW (ref 4.2–5.8)
RBC # FLD: 13.8 % — SIGNIFICANT CHANGE UP (ref 10.3–14.5)
SODIUM SERPL-SCNC: 136 MMOL/L — SIGNIFICANT CHANGE UP (ref 135–145)
SODIUM SERPL-SCNC: 139 MMOL/L — SIGNIFICANT CHANGE UP (ref 135–145)
TACROLIMUS SERPL-MCNC: 4.2 NG/ML — SIGNIFICANT CHANGE UP
WBC # BLD: 2.82 K/UL — LOW (ref 3.8–10.5)
WBC # FLD AUTO: 2.82 K/UL — LOW (ref 3.8–10.5)

## 2025-02-27 PROCEDURE — 99232 SBSQ HOSP IP/OBS MODERATE 35: CPT | Mod: GC

## 2025-02-27 PROCEDURE — 76776 US EXAM K TRANSPL W/DOPPLER: CPT | Mod: 26,RT

## 2025-02-27 RX ORDER — HEPARIN SODIUM 1000 [USP'U]/ML
5000 INJECTION INTRAVENOUS; SUBCUTANEOUS EVERY 12 HOURS
Refills: 0 | Status: DISCONTINUED | OUTPATIENT
Start: 2025-02-27 | End: 2025-03-04

## 2025-02-27 RX ORDER — DEXTROSE 50 % IN WATER 50 %
50 SYRINGE (ML) INTRAVENOUS ONCE
Refills: 0 | Status: COMPLETED | OUTPATIENT
Start: 2025-02-27 | End: 2025-02-27

## 2025-02-27 RX ORDER — METHYLPREDNISOLONE ACETATE 80 MG/ML
125 INJECTION, SUSPENSION INTRA-ARTICULAR; INTRALESIONAL; INTRAMUSCULAR; SOFT TISSUE ONCE
Refills: 0 | Status: COMPLETED | OUTPATIENT
Start: 2025-02-27 | End: 2025-02-27

## 2025-02-27 RX ADMIN — Medication 500000 UNIT(S): at 00:49

## 2025-02-27 RX ADMIN — POLYETHYLENE GLYCOL 3350 17 GRAM(S): 17 POWDER, FOR SOLUTION ORAL at 12:44

## 2025-02-27 RX ADMIN — Medication 20 MILLIGRAM(S): at 12:44

## 2025-02-27 RX ADMIN — METHYLPREDNISOLONE ACETATE 125 MILLIGRAM(S): 80 INJECTION, SUSPENSION INTRA-ARTICULAR; INTRALESIONAL; INTRAMUSCULAR; SOFT TISSUE at 22:01

## 2025-02-27 RX ADMIN — Medication 500000 UNIT(S): at 12:44

## 2025-02-27 RX ADMIN — HEPARIN SODIUM 5000 UNIT(S): 1000 INJECTION INTRAVENOUS; SUBCUTANEOUS at 12:44

## 2025-02-27 RX ADMIN — TRAMADOL HYDROCHLORIDE 50 MILLIGRAM(S): 50 TABLET, FILM COATED ORAL at 07:28

## 2025-02-27 RX ADMIN — Medication 500000 UNIT(S): at 17:14

## 2025-02-27 RX ADMIN — SODIUM CHLORIDE 70 MILLILITER(S): 9 INJECTION, SOLUTION INTRAVENOUS at 05:16

## 2025-02-27 RX ADMIN — INSULIN LISPRO 4: 100 INJECTION, SOLUTION INTRAVENOUS; SUBCUTANEOUS at 06:28

## 2025-02-27 RX ADMIN — INSULIN LISPRO 2: 100 INJECTION, SOLUTION INTRAVENOUS; SUBCUTANEOUS at 00:50

## 2025-02-27 RX ADMIN — TACROLIMUS 4 MILLIGRAM(S): 0.5 CAPSULE ORAL at 08:24

## 2025-02-27 RX ADMIN — Medication 50 MILLILITER(S): at 09:15

## 2025-02-27 RX ADMIN — INSULIN LISPRO 4: 100 INJECTION, SOLUTION INTRAVENOUS; SUBCUTANEOUS at 12:44

## 2025-02-27 RX ADMIN — MYCOPHENOLATE MOFETIL 1 GRAM(S): 500 TABLET, FILM COATED ORAL at 08:24

## 2025-02-27 RX ADMIN — TRAMADOL HYDROCHLORIDE 50 MILLIGRAM(S): 50 TABLET, FILM COATED ORAL at 06:28

## 2025-02-27 RX ADMIN — Medication 5 UNIT(S): at 09:15

## 2025-02-27 RX ADMIN — MYCOPHENOLATE MOFETIL 1 GRAM(S): 500 TABLET, FILM COATED ORAL at 22:00

## 2025-02-27 RX ADMIN — Medication 25 MILLIGRAM(S): at 05:33

## 2025-02-27 RX ADMIN — Medication 500000 UNIT(S): at 05:33

## 2025-02-27 RX ADMIN — Medication 1 APPLICATION(S): at 12:45

## 2025-02-27 NOTE — DIETITIAN INITIAL EVALUATION ADULT - PERTINENT MEDS FT
MEDICATIONS  (STANDING):  chlorhexidine 2% Cloths 1 Application(s) Topical daily  dextrose 5%. 1000 milliLiter(s) (50 mL/Hr) IV Continuous <Continuous>  dextrose 5%. 1000 milliLiter(s) (100 mL/Hr) IV Continuous <Continuous>  dextrose 50% Injectable 12.5 Gram(s) IV Push once  dextrose 50% Injectable 25 Gram(s) IV Push once  dextrose 50% Injectable 25 Gram(s) IV Push once  famotidine    Tablet 20 milliGRAM(s) Oral daily  glucagon  Injectable 1 milliGRAM(s) IntraMuscular once  heparin   Injectable 5000 Unit(s) SubCutaneous every 12 hours  insulin lispro (ADMELOG) corrective regimen sliding scale   SubCutaneous every 6 hours  mycophenolate mofetil 1 Gram(s) Oral every 12 hours  nystatin    Suspension 999606 Unit(s) Swish and Swallow four times a day  polyethylene glycol 3350 17 Gram(s) Oral daily  tacrolimus ER Tablet (ENVARSUS XR) 4 milliGRAM(s) Oral <User Schedule>  valGANciclovir 450 milliGRAM(s) Oral <User Schedule>    MEDICATIONS  (PRN):  dextrose Oral Gel 15 Gram(s) Oral once PRN Blood Glucose LESS THAN 70 milliGRAM(s)/deciliter  HYDROmorphone  Injectable 0.5 milliGRAM(s) IV Push every 10 minutes PRN Moderate Pain (4 - 6)  traMADol 25 milliGRAM(s) Oral every 8 hours PRN Moderate Pain (4 - 6)  traMADol 50 milliGRAM(s) Oral every 8 hours PRN Severe Pain (7 - 10)

## 2025-02-27 NOTE — PROGRESS NOTE ADULT - SUBJECTIVE AND OBJECTIVE BOX
Transplant Surgery -Post Op Check  --------------------------------------------------------------   Tx Date:  2/25/2025       POD#2    HPI: 70 y/o M PMHX DM (retinopathy,neuropathy), HTN, anemia, chronic CHF, TTE w/ grade II diastolic dysfunction mild/mod MR and mild TR (2023), HLD, and CKD ESRD on HD since Jan 2023 via LUE AVF that presents as a direct admit for DDRT. Patient's last HD session was 2/24/2025 prior to admission, states he makes slightly more than 1 cup of urine per day. Patient denies any acute complaints at this time, feels in his usual state of health.     Patient is now s/p DDRT 2a on patch/1v/1u + stent on 2/25/2025 under Thymoglobulin induction    Interval Events:  - low UO   - afebrile, required hydral IVP and PO ON     Immunosuppression  Induction: Thymo  Maintenance: FK/MMF/SST  Ongoing monitoring for signs of rejection     Potential Discharge date: TBD  Education:  Medications  Plan of care:  See Below        MEDICATIONS  (STANDING):  chlorhexidine 2% Cloths 1 Application(s) Topical daily  dextrose 5%. 1000 milliLiter(s) (50 mL/Hr) IV Continuous <Continuous>  dextrose 5%. 1000 milliLiter(s) (100 mL/Hr) IV Continuous <Continuous>  dextrose 50% Injectable 25 Gram(s) IV Push once  dextrose 50% Injectable 12.5 Gram(s) IV Push once  dextrose 50% Injectable 25 Gram(s) IV Push once  famotidine    Tablet 20 milliGRAM(s) Oral daily  glucagon  Injectable 1 milliGRAM(s) IntraMuscular once  heparin   Injectable 5000 Unit(s) SubCutaneous every 12 hours  insulin lispro (ADMELOG) corrective regimen sliding scale   SubCutaneous every 6 hours  methylPREDNISolone sodium succinate Injectable 125 milliGRAM(s) IV Push once  mycophenolate mofetil 1 Gram(s) Oral every 12 hours  nystatin    Suspension 572604 Unit(s) Swish and Swallow four times a day  polyethylene glycol 3350 17 Gram(s) Oral daily  tacrolimus ER Tablet (ENVARSUS XR) 4 milliGRAM(s) Oral <User Schedule>  valGANciclovir 450 milliGRAM(s) Oral <User Schedule>    MEDICATIONS  (PRN):  dextrose Oral Gel 15 Gram(s) Oral once PRN Blood Glucose LESS THAN 70 milliGRAM(s)/deciliter  HYDROmorphone  Injectable 0.5 milliGRAM(s) IV Push every 10 minutes PRN Moderate Pain (4 - 6)  traMADol 25 milliGRAM(s) Oral every 8 hours PRN Moderate Pain (4 - 6)  traMADol 50 milliGRAM(s) Oral every 8 hours PRN Severe Pain (7 - 10)      PAST MEDICAL & SURGICAL HISTORY:  HTN (hypertension)      Type 2 diabetes mellitus      ESRD on dialysis  mwf      Dyslipidemia      S/P arteriovenous (AV) fistula creation          Vital Signs Last 24 Hrs  T(C): 36.5 (27 Feb 2025 17:01), Max: 36.9 (26 Feb 2025 18:54)  T(F): 97.7 (27 Feb 2025 17:01), Max: 98.4 (26 Feb 2025 18:54)  HR: 89 (27 Feb 2025 17:01) (74 - 124)  BP: 159/79 (27 Feb 2025 17:01) (133/69 - 187/78)  BP(mean): 115 (27 Feb 2025 03:16) (102 - 123)  RR: 18 (27 Feb 2025 17:01) (18 - 18)  SpO2: 93% (27 Feb 2025 17:01) (93% - 98%)    Parameters below as of 27 Feb 2025 17:01  Patient On (Oxygen Delivery Method): room air        I&O's Summary    26 Feb 2025 07:01  -  27 Feb 2025 07:00  --------------------------------------------------------  IN: 1856 mL / OUT: 433 mL / NET: 1423 mL    27 Feb 2025 07:01  -  27 Feb 2025 18:27  --------------------------------------------------------  IN: 590 mL / OUT: 264 mL / NET: 326 mL                              8.7    2.82  )-----------( 76       ( 27 Feb 2025 07:23 )             27.6     02-27    136  |  91[L]  |  84[H]  ----------------------------<  248[H]  5.2   |  18[L]  |  13.07[H]    Ca    9.2      27 Feb 2025 12:48  Phos  9.8     02-27  Mg     2.5     02-27    TPro  6.5  /  Alb  3.3  /  TBili  0.2  /  DBili  x   /  AST  31  /  ALT  6[L]  /  AlkPhos  83  02-27    Tacrolimus (), Serum: 4.2 ng/mL (02-27 @ 07:24)          Review of systems  Gen: No weight changes, fatigue, fevers/chills, weakness  Skin: No rashes  Head/Eyes/Ears/Mouth: No headache; Normal hearing; Normal vision w/o blurriness; No sinus pain/discomfort, sore throat  Respiratory: No dyspnea, cough, wheezing, hemoptysis  CV: No chest pain, PND, orthopnea  GI: Mild abdominal pain at surgical incision site; denies diarrhea, constipation, nausea, vomiting, melena, hematochezia  : No increased frequency, dysuria, hematuria, nocturia  MSK: No joint pain/swelling; no back pain; no edema  Neuro: No dizziness/lightheadedness, weakness, seizures, numbness, tingling  Heme: No easy bruising or bleeding  Endo: No heat/cold intolerance  Psych: No significant nervousness, anxiety, stress, depression  All other systems were reviewed and are negative, except as noted.      PHYSICAL EXAM:  Constitutional: Well developed / well nourished  Eyes: Anicteric, PERRLA  ENMT: nc/at  Neck: supple  Respiratory: CTA B/L  Cardiovascular: RRR  Gastrointestinal: Soft, non distended, mild tenderness at the incision site; incision c/d/i; JULES x2  ss  Genitourinary: Urinary catheter in place, low UO   Extremities: SCD's in place and working bilaterally, AVF LUE  Vascular: Palpable dp pulses bilaterally  Neurological: A&O x3  Skin: no rashes, ulcerations or lesions;  Musculoskeletal: Moving all extremities  Psychiatric: Responsive

## 2025-02-27 NOTE — DIETITIAN INITIAL EVALUATION ADULT - EDUCATION DIETARY MODIFICATIONS
Reviewed post-transplant nutrition therapy and food safety guidelines for transplant recipients. Reviewed recommendations to avoid grapefruit, pomegranate and star fruit while taking immunosuppressant medication. Reviewed recommendations for moderate intake of sodium with transplant medications. Reviewed Consistent Carbohydrate diet, including carbohydrate content of foods and portion sizes. Pt is advised that BG management may be more challenging after transplant; follow up with outpatient MD/Endocrinologist is recommended. Pt was receptive and expressed understanding.   Education Handouts Provided: USDA Food Safety for Transplant Recipients booklet./teach back/(1) partially meets; needs review/practice/verbalization

## 2025-02-27 NOTE — PHYSICAL THERAPY INITIAL EVALUATION ADULT - LIVES WITH, PROFILE
Apartment with 3 steps to enter. Pt reports he was independent with all mobility & ADLs without AD prior to admit./alone

## 2025-02-27 NOTE — PHYSICAL THERAPY INITIAL EVALUATION ADULT - GENERAL OBSERVATIONS, REHAB EVAL
Chart reviewed events to date noted. Blood glucose reviewed. Pt tolerated 45min PT initial evaluation well. POD2 DDRT, rec'd in chair in NA, +jimenez, JULES matos, agreeable with PT.

## 2025-02-27 NOTE — PROGRESS NOTE ADULT - SUBJECTIVE AND OBJECTIVE BOX
[facility] DIVISION OF KIDNEY DISEASES AND HYPERTENSION -- FOLLOW UP NOTE  --------------------------------------------------------------------------------  Chief Complaint:    24 hour events/subjective: Patient seen and examined earlier today. Feels well. Resting in a chair.         PAST HISTORY  --------------------------------------------------------------------------------  No significant changes to PMH, PSH, FHx, SHx, unless otherwise noted    ALLERGIES & MEDICATIONS  --------------------------------------------------------------------------------  Allergies    No Known Allergies    Intolerances      Standing Inpatient Medications  chlorhexidine 2% Cloths 1 Application(s) Topical daily  dextrose 5%. 1000 milliLiter(s) IV Continuous <Continuous>  dextrose 5%. 1000 milliLiter(s) IV Continuous <Continuous>  dextrose 50% Injectable 25 Gram(s) IV Push once  dextrose 50% Injectable 12.5 Gram(s) IV Push once  dextrose 50% Injectable 25 Gram(s) IV Push once  famotidine    Tablet 20 milliGRAM(s) Oral daily  glucagon  Injectable 1 milliGRAM(s) IntraMuscular once  heparin   Injectable 5000 Unit(s) SubCutaneous every 12 hours  insulin lispro (ADMELOG) corrective regimen sliding scale   SubCutaneous every 6 hours  mycophenolate mofetil 1 Gram(s) Oral every 12 hours  nystatin    Suspension 294012 Unit(s) Swish and Swallow four times a day  polyethylene glycol 3350 17 Gram(s) Oral daily  tacrolimus ER Tablet (ENVARSUS XR) 4 milliGRAM(s) Oral <User Schedule>  valGANciclovir 450 milliGRAM(s) Oral <User Schedule>    PRN Inpatient Medications  dextrose Oral Gel 15 Gram(s) Oral once PRN  HYDROmorphone  Injectable 0.5 milliGRAM(s) IV Push every 10 minutes PRN  traMADol 25 milliGRAM(s) Oral every 8 hours PRN  traMADol 50 milliGRAM(s) Oral every 8 hours PRN      REVIEW OF SYSTEMS  --------------------------------------------------------------------------------  Gen: No fevers/chills  Respiratory: No dyspnea, cough,   CV: No chest pain, PND, orthopnea  GI: No abdominal pain, diarrhea, constipation, nausea, vomiting  Transplant: No pain  : No increased frequency, dysuria, hematuria   MSK: No edema  Neuro: No dizziness/lightheadedness    All other systems were reviewed and are negative, except as noted.    VITALS/PHYSICAL EXAM  --------------------------------------------------------------------------------  T(C): 36.7 (02-27-25 @ 13:00), Max: 36.9 (02-26-25 @ 17:00)  HR: 91 (02-27-25 @ 13:00) (74 - 124)  BP: 143/81 (02-27-25 @ 13:00) (133/69 - 187/78)  RR: 18 (02-27-25 @ 13:00) (18 - 18)  SpO2: 94% (02-27-25 @ 13:00) (93% - 100%)  Wt(kg): --        02-26-25 @ 07:01  -  02-27-25 @ 07:00  --------------------------------------------------------  IN: 1856 mL / OUT: 433 mL / NET: 1423 mL    02-27-25 @ 07:01  -  02-27-25 @ 15:14  --------------------------------------------------------  IN: 310 mL / OUT: 79 mL / NET: 231 mL      Physical Exam:  Gen: NAD, able to speak in full sentences   HEENT: PERRL, MMM   Pulm: CTA B/L, no crackles   CV: RRR, S1S2+  Abd: +BS, soft  Transplant: No tenderness, swelling  : No suprapubic tenderness, JULES drains in place   MSK: no edema   Psych: Normal affect and mood  Skin: Warm  Access: Left AVF    LABS/STUDIES  --------------------------------------------------------------------------------              8.7    2.82  >-----------<  76       [02-27-25 @ 07:23]              27.6     136  |  91  |  84  ----------------------------<  248      [02-27-25 @ 12:48]  5.2   |  18  |  13.07        Ca     9.2     [02-27-25 @ 12:48]      Mg     2.5     [02-27-25 @ 07:21]      Phos  9.8     [02-27-25 @ 07:21]    TPro  6.5  /  Alb  3.3  /  TBili  0.2  /  DBili  x   /  AST  31  /  ALT  6   /  AlkPhos  83  [02-27-25 @ 07:21]  Creatinine Trend:  SCr 13.07 [02-27 @ 12:48]  SCr 12.94 [02-27 @ 07:21]  SCr 10.79 [02-26 @ 05:09]  SCr 10.28 [02-25 @ 23:00]  SCr 9.50 [02-25 @ 17:24]    Tacrolimus (), Serum: 4.2 ng/mL (02-27 @ 07:24)                HBsAb 11.3      [02-25-25 @ 00:35]  HBsAg Nonreact      [02-25-25 @ 00:35]  HBcAb Reactive      [02-25-25 @ 00:35]  HCV 0.05, Nonreact      [02-25-25 @ 00:35]  HIV Nonreact      [02-25-25 @ 00:34]

## 2025-02-27 NOTE — PROGRESS NOTE ADULT - ASSESSMENT
70 y/o M PMHX DM (retinopathy,neuropathy), HTN, anemia, chronic CHF, TTE w/ grade II diastolic dysfunction mild/mod MR and mild TR (2023), HLD, and CKD ESRD on HD since Jan 2023 via LUE AVF that presents as a direct admit for DDRT on 2/26. Transplant nephrology consulted for management of DDRT.     1. S/p DDRT:   Patient ESRD on Hemodialysis since Jan 2023 likely underlying kidney ds from long standing DKD. Patient's last HD session was 2/24/2025 prior to admission.  Patient underwent DDRt on 2/25/2025.   Right Kidney transplanted on the RLQ under steroid and Thymo induction;   ureteral stent x 1 placed; 1V / 2A anastomosed to single patch/ 1 U (stented)  CIT 36 hours 30 min  Scr elevated at 10.79, BUN 52 on 2/26. Electrolytes fair. Euvolemic on RA no pedal edema.  No urgent need for Hemodialysis today  Consent obtained from the patient and placed in the chart.  Patient with minimal UOp. Given IV lasix 80 mg IV 2/26. Minimal Uop    Pln for Hemodialysis today - 1.5L UF   Stop IV Fluids   Repeat Transplant US    Monitor labs and UOP. Avoid any potential nephrotoxins (NSAIDs/ACEi/ARBs) when possible. Dose medications as per eGFR/HD.     2. Immunosuppression   -Envarsus 4mg  -Monitor FK daily, per level moving forward  -MMF 1g PO BID  -Thymo 150mg / solumedrol 250mg today    3. HTN: on Nifedipine 30mg qd  4. DM: Insulin Admelog ISS

## 2025-02-27 NOTE — DIETITIAN INITIAL EVALUATION ADULT - NSFNSGIIOFT_GEN_A_CORE
I&O's Detail    26 Feb 2025 07:01  -  27 Feb 2025 07:00  --------------------------------------------------------  IN:    Modular Fluid: 176 mL    multiple electrolytes Injection Type 1.: 1680 mL  Total IN: 1856 mL    OUT:    Bulb (mL): 130 mL    Bulb (mL): 35 mL    Indwelling Catheter - Urethral (mL): 268 mL    multiple electrolytes Injection Type 1.: 0 mL    Oral Fluid: 0 mL  Total OUT: 433 mL    Total NET: 1423 mL      27 Feb 2025 07:01  -  27 Feb 2025 13:25  --------------------------------------------------------  IN:    multiple electrolytes Injection Type 1.: 210 mL    Oral Fluid: 100 mL  Total IN: 310 mL    OUT:    Bulb (mL): 6 mL    Bulb (mL): 8 mL    Indwelling Catheter - Urethral (mL): 65 mL  Total OUT: 79 mL    Total NET: 231 mL

## 2025-02-27 NOTE — DIETITIAN INITIAL EVALUATION ADULT - OTHER INFO
S/p DDRT (); POD #2  -- Tacrolimus, Cellcept transplant meds ordered  -- Hyperkalemia and trending hyperphosphatemia; pt reports tentative plan for HD today   -- BG management: POCTs x 24 hrs elevated; SSI   -- Urine output per flow sheets 65 ml thus far (), 268 ml (), 183 ml ()     Wt Hx:  - Reports UBW (dry wt): 91 kg   - Per chart, dosing wt of 89.6 kg ().   - Daily standing wts in k (), 92.7 ()  - Wt hx in kg ([facility]) as follows: 91.6 (24), 90 (24).   - Weight fluctuations in setting of fluid shifts (IVF, intraoperative fluids). Will continue to monitor weight trends as available/able.    - IBW: 72.7 kg (based on ht of 69 in)

## 2025-02-27 NOTE — DIETITIAN INITIAL EVALUATION ADULT - REASON FOR ADMISSION
Status post kidney transplant    Per H&P, "PMHX DM (retinopathy,neuropathy), HTN, anemia, chronic CHF, TTE w/ grade II diastolic dysfunction mild/mod MR and mild TR (2023), HLD, and CKD ESRD on HD since Jan 2023 via LUE AVF; Patient's last HD session was 2/24/2025 prior to admission, states he makes slightly more than 1 cup of urine per day"

## 2025-02-27 NOTE — DIETITIAN INITIAL EVALUATION ADULT - PERTINENT LABORATORY DATA
02-27    139  |  94[L]  |  75[H]  ----------------------------<  185[H]  5.9[H]   |  21[L]  |  12.94[H]    Ca    8.8      27 Feb 2025 07:21  Phos  9.8     02-27  Mg     2.5     02-27    TPro  6.5  /  Alb  3.3  /  TBili  0.2  /  DBili  x   /  AST  31  /  ALT  6[L]  /  AlkPhos  83  02-27  POCT Blood Glucose.: 250 mg/dL (02-27-25 @ 12:15)  A1C with Estimated Average Glucose Result: 5.9 % (02-26-25 @ 07:57)  A1C with Estimated Average Glucose Result: 5.8 % (02-26-25 @ 05:09)

## 2025-02-27 NOTE — DIETITIAN INITIAL EVALUATION ADULT - NSFNSGIASSESSMENTFT_GEN_A_CORE
No N/V nor diarrhea/constipation reported; last BM 2/24. Bowel regimen ordered: Miralax  Pepcid ordered

## 2025-02-27 NOTE — PHYSICAL THERAPY INITIAL EVALUATION ADULT - PERTINENT HX OF CURRENT PROBLEM, REHAB EVAL
68 y/o M PMHX DM (retinopathy,neuropathy), HTN, anemia, chronic CHF, TTE w/ grade II diastolic dysfunction mild/mod MR and mild TR (2023), HLD, and CKD ESRD on HD since Jan 2023 via LUE AVF that presents as a direct admit for DDRT. Patient's last HD session was 2/24/2025 prior to admission, states he makes slightly more than 1 cup of urine per day. Patient denies any acute complaints at this time, feels in his usual state of health.     Patient is now s/p DDRT 2a/1v/1u+stent

## 2025-02-27 NOTE — DIETITIAN INITIAL EVALUATION ADULT - ADD RECOMMEND
1) Defer diet advancement to team; consider advancing from clear liquids to renal diet as medically feasible   2) Reinforce post-transplant nutrition therapy and food safety guidelines in-house and prior to discharge.   3) Discharge diet: Continue as above. Recommend follow up visit with Transplant MD and outpatient RD for dietary modifications as warranted.  4) Monitor PO intake,  Urine Output, GI tolerance, skin integrity, and labs. RD remains available if needed, pt is aware.  1) Defer diet advancement to team; consider advancing from clear liquids to consistent carbohydrate, renal diet as medically feasible   2) Reinforce post-transplant nutrition therapy and food safety guidelines in-house and prior to discharge.   3) Discharge diet: Continue as above. Recommend follow up visit with Transplant MD and outpatient RD for dietary modifications as warranted.  4) Monitor PO intake,  Urine Output, GI tolerance, skin integrity, and labs. RD remains available if needed, pt is aware.

## 2025-02-27 NOTE — DIETITIAN INITIAL EVALUATION ADULT - ENERGY INTAKE
- Diet advanced from NPO to clear liquids today; Endorses good appetite and tolerance with clear liquid tray this morning.

## 2025-02-27 NOTE — DIETITIAN INITIAL EVALUATION ADULT - ORAL INTAKE PTA/DIET HISTORY
Reports good appetite/PO intake; consuming meals >2x/day w/ snacks at HD. Followed a low sodium, potassium and phosphorus diet PTA; limited fluid intake; minimized intake of concentrated sweets and portioned out carbohydrates. No micronutrient supplementation; had Nepro Protein Shake (per serving provides 19 g PRO, 420 kcal) 1x/day. Confirms no known food allergies/intolerances. No prior chewing/swallowing difficulties reported.

## 2025-02-27 NOTE — PROGRESS NOTE ADULT - ASSESSMENT
70 y/o M PMHX DM (retinopathy,neuropathy), HTN, anemia, chronic CHF, TTE w/ grade II diastolic dysfunction mild/mod MR and mild TR (2023), HLD, and CKD ESRD on HD since Jan 2023 via LUE AVF that presents as a direct admit for DDRT. Patient's last HD session was 2/24/2025 prior to admission, states he makes slightly more than 1 cup of urine per day. Patient denies any acute complaints at this time, feels in his usual state of health.   Patient is now s/p DDRT 2a on patch/1v/1u + stent on 2/25/2025 under Thymoglobulin induction    [] POD#2 DDRT  -SGF  -Lasix 80mg IV x1 with minimal improvement   - Cr up, UO low, hyperK, will need HD today   - strict I's & O's: carlo JPx2  - Renal Doppler: patent, small collection 4x2 cm  - ADAT, IVF dc'ed   - SCDs/PT/IS  - LDH  - SQH started     []immuno   -Envarsus by level, MMF 1/1, SST   -ppx: nystatin, valcyte MWF      [] HTN  -c/w Nifedipine 30mg PO QD  -Goal -170 /     [] DM  -ISS  -Podiatry consult (pt to miss scheduled appt tomorrow as outpt)

## 2025-02-27 NOTE — DIETITIAN INITIAL EVALUATION ADULT - REASON INDICATOR FOR ASSESSMENT
RD assessment indicated for:  Pt seen for post kidney transplant recipient nutrition evaluation per department protocol.   Source: Pt, Brother (at bedside), Team, Electronic Medical Record   Chart reviewed, events noted.

## 2025-02-27 NOTE — DIETITIAN INITIAL EVALUATION ADULT - PERSON TAUGHT/METHOD
brother (at bedside)/verbal instruction/written material/teach back - (Patient repeats in own words)/patient instructed

## 2025-02-28 LAB
ALBUMIN SERPL ELPH-MCNC: 3.8 G/DL — SIGNIFICANT CHANGE UP (ref 3.3–5)
ALP SERPL-CCNC: 88 U/L — SIGNIFICANT CHANGE UP (ref 40–120)
ALT FLD-CCNC: <5 U/L — LOW (ref 10–45)
ANION GAP SERPL CALC-SCNC: 20 MMOL/L — HIGH (ref 5–17)
AST SERPL-CCNC: 20 U/L — SIGNIFICANT CHANGE UP (ref 10–40)
BILIRUB SERPL-MCNC: 0.3 MG/DL — SIGNIFICANT CHANGE UP (ref 0.2–1.2)
BLD GP AB SCN SERPL QL: NEGATIVE — SIGNIFICANT CHANGE UP
BUN SERPL-MCNC: 61 MG/DL — HIGH (ref 7–23)
CALCIUM SERPL-MCNC: 9 MG/DL — SIGNIFICANT CHANGE UP (ref 8.4–10.5)
CHLORIDE SERPL-SCNC: 94 MMOL/L — LOW (ref 96–108)
CO2 SERPL-SCNC: 24 MMOL/L — SIGNIFICANT CHANGE UP (ref 22–31)
CREAT SERPL-MCNC: 9.87 MG/DL — HIGH (ref 0.5–1.3)
EGFR: 5 ML/MIN/1.73M2 — LOW
EGFR: 5 ML/MIN/1.73M2 — LOW
GLUCOSE BLDC GLUCOMTR-MCNC: 170 MG/DL — HIGH (ref 70–99)
GLUCOSE BLDC GLUCOMTR-MCNC: 184 MG/DL — HIGH (ref 70–99)
GLUCOSE BLDC GLUCOMTR-MCNC: 195 MG/DL — HIGH (ref 70–99)
GLUCOSE BLDC GLUCOMTR-MCNC: 201 MG/DL — HIGH (ref 70–99)
GLUCOSE BLDC GLUCOMTR-MCNC: 254 MG/DL — HIGH (ref 70–99)
GLUCOSE SERPL-MCNC: 193 MG/DL — HIGH (ref 70–99)
HCT VFR BLD CALC: 30.5 % — LOW (ref 39–50)
HGB BLD-MCNC: 9.8 G/DL — LOW (ref 13–17)
LDH SERPL L TO P-CCNC: 344 U/L — HIGH (ref 50–242)
MAGNESIUM SERPL-MCNC: 2.5 MG/DL — SIGNIFICANT CHANGE UP (ref 1.6–2.6)
MCHC RBC-ENTMCNC: 30 PG — SIGNIFICANT CHANGE UP (ref 27–34)
MCHC RBC-ENTMCNC: 32.1 G/DL — SIGNIFICANT CHANGE UP (ref 32–36)
MCV RBC AUTO: 93.3 FL — SIGNIFICANT CHANGE UP (ref 80–100)
NRBC BLD AUTO-RTO: 0 /100 WBCS — SIGNIFICANT CHANGE UP (ref 0–0)
PHOSPHATE SERPL-MCNC: 6.5 MG/DL — HIGH (ref 2.5–4.5)
PLATELET # BLD AUTO: 89 K/UL — LOW (ref 150–400)
POTASSIUM SERPL-MCNC: 5.2 MMOL/L — SIGNIFICANT CHANGE UP (ref 3.5–5.3)
POTASSIUM SERPL-SCNC: 5.2 MMOL/L — SIGNIFICANT CHANGE UP (ref 3.5–5.3)
PROT SERPL-MCNC: 7.4 G/DL — SIGNIFICANT CHANGE UP (ref 6–8.3)
RBC # BLD: 3.27 M/UL — LOW (ref 4.2–5.8)
RBC # FLD: 13.6 % — SIGNIFICANT CHANGE UP (ref 10.3–14.5)
RH IG SCN BLD-IMP: POSITIVE — SIGNIFICANT CHANGE UP
SODIUM SERPL-SCNC: 138 MMOL/L — SIGNIFICANT CHANGE UP (ref 135–145)
TACROLIMUS SERPL-MCNC: 3.4 NG/ML — SIGNIFICANT CHANGE UP
WBC # BLD: 3.62 K/UL — LOW (ref 3.8–10.5)
WBC # FLD AUTO: 3.62 K/UL — LOW (ref 3.8–10.5)

## 2025-02-28 PROCEDURE — 99232 SBSQ HOSP IP/OBS MODERATE 35: CPT | Mod: GC

## 2025-02-28 RX ORDER — ACETAMINOPHEN 500 MG/5ML
650 LIQUID (ML) ORAL ONCE
Refills: 0 | Status: COMPLETED | OUTPATIENT
Start: 2025-02-28 | End: 2025-02-28

## 2025-02-28 RX ORDER — BISACODYL 5 MG
10 TABLET, DELAYED RELEASE (ENTERIC COATED) ORAL ONCE
Refills: 0 | Status: COMPLETED | OUTPATIENT
Start: 2025-02-28 | End: 2025-02-28

## 2025-02-28 RX ORDER — TACROLIMUS 0.5 MG/1
2 CAPSULE ORAL ONCE
Refills: 0 | Status: COMPLETED | OUTPATIENT
Start: 2025-02-28 | End: 2025-02-28

## 2025-02-28 RX ORDER — METHYLPREDNISOLONE ACETATE 80 MG/ML
60 INJECTION, SUSPENSION INTRA-ARTICULAR; INTRALESIONAL; INTRAMUSCULAR; SOFT TISSUE ONCE
Refills: 0 | Status: COMPLETED | OUTPATIENT
Start: 2025-02-28 | End: 2025-02-28

## 2025-02-28 RX ORDER — TACROLIMUS 0.5 MG/1
6 CAPSULE ORAL
Refills: 0 | Status: DISCONTINUED | OUTPATIENT
Start: 2025-03-01 | End: 2025-03-03

## 2025-02-28 RX ORDER — FUROSEMIDE 10 MG/ML
80 INJECTION INTRAMUSCULAR; INTRAVENOUS ONCE
Refills: 0 | Status: COMPLETED | OUTPATIENT
Start: 2025-02-28 | End: 2025-02-28

## 2025-02-28 RX ORDER — DIPHENHYDRAMINE HCL 12.5MG/5ML
50 ELIXIR ORAL ONCE
Refills: 0 | Status: COMPLETED | OUTPATIENT
Start: 2025-02-28 | End: 2025-02-28

## 2025-02-28 RX ADMIN — FUROSEMIDE 80 MILLIGRAM(S): 10 INJECTION INTRAMUSCULAR; INTRAVENOUS at 14:04

## 2025-02-28 RX ADMIN — MYCOPHENOLATE MOFETIL 1 GRAM(S): 500 TABLET, FILM COATED ORAL at 08:21

## 2025-02-28 RX ADMIN — HEPARIN SODIUM 5000 UNIT(S): 1000 INJECTION INTRAVENOUS; SUBCUTANEOUS at 05:34

## 2025-02-28 RX ADMIN — Medication 1 APPLICATION(S): at 12:21

## 2025-02-28 RX ADMIN — Medication 500000 UNIT(S): at 00:41

## 2025-02-28 RX ADMIN — INSULIN LISPRO 2: 100 INJECTION, SOLUTION INTRAVENOUS; SUBCUTANEOUS at 17:21

## 2025-02-28 RX ADMIN — VALGANCICLOVIR 450 MILLIGRAM(S): 450 TABLET, FILM COATED ORAL at 08:21

## 2025-02-28 RX ADMIN — Medication 10 MILLIGRAM(S): at 06:37

## 2025-02-28 RX ADMIN — TRAMADOL HYDROCHLORIDE 50 MILLIGRAM(S): 50 TABLET, FILM COATED ORAL at 08:20

## 2025-02-28 RX ADMIN — Medication 500000 UNIT(S): at 12:21

## 2025-02-28 RX ADMIN — TACROLIMUS 4 MILLIGRAM(S): 0.5 CAPSULE ORAL at 08:21

## 2025-02-28 RX ADMIN — INSULIN LISPRO 2: 100 INJECTION, SOLUTION INTRAVENOUS; SUBCUTANEOUS at 12:18

## 2025-02-28 RX ADMIN — INSULIN LISPRO 2: 100 INJECTION, SOLUTION INTRAVENOUS; SUBCUTANEOUS at 05:49

## 2025-02-28 RX ADMIN — Medication 20 MILLIGRAM(S): at 12:21

## 2025-02-28 RX ADMIN — TRAMADOL HYDROCHLORIDE 50 MILLIGRAM(S): 50 TABLET, FILM COATED ORAL at 09:20

## 2025-02-28 RX ADMIN — MYCOPHENOLATE MOFETIL 1 GRAM(S): 500 TABLET, FILM COATED ORAL at 20:11

## 2025-02-28 RX ADMIN — INSULIN LISPRO 6: 100 INJECTION, SOLUTION INTRAVENOUS; SUBCUTANEOUS at 00:41

## 2025-02-28 RX ADMIN — METHYLPREDNISOLONE ACETATE 60 MILLIGRAM(S): 80 INJECTION, SUSPENSION INTRA-ARTICULAR; INTRALESIONAL; INTRAMUSCULAR; SOFT TISSUE at 14:04

## 2025-02-28 RX ADMIN — Medication 50 MILLIGRAM(S): at 14:05

## 2025-02-28 RX ADMIN — POLYETHYLENE GLYCOL 3350 17 GRAM(S): 17 POWDER, FOR SOLUTION ORAL at 12:21

## 2025-02-28 RX ADMIN — Medication 650 MILLIGRAM(S): at 15:03

## 2025-02-28 RX ADMIN — Medication 650 MILLIGRAM(S): at 14:03

## 2025-02-28 RX ADMIN — TACROLIMUS 2 MILLIGRAM(S): 0.5 CAPSULE ORAL at 14:05

## 2025-02-28 RX ADMIN — Medication 500000 UNIT(S): at 17:15

## 2025-02-28 RX ADMIN — HEPARIN SODIUM 5000 UNIT(S): 1000 INJECTION INTRAVENOUS; SUBCUTANEOUS at 17:15

## 2025-02-28 RX ADMIN — Medication 500000 UNIT(S): at 05:33

## 2025-02-28 NOTE — PROGRESS NOTE ADULT - SUBJECTIVE AND OBJECTIVE BOX
Transplant Surgery -Post Op Check  --------------------------------------------------------------   Tx Date:  2/25/2025       POD#3    HPI: 70 y/o M PMHX DM (retinopathy,neuropathy), HTN, anemia, chronic CHF, TTE w/ grade II diastolic dysfunction mild/mod MR and mild TR (2023), HLD, and CKD ESRD on HD since Jan 2023 via LUE AVF that presents as a direct admit for DDRT. Patient's last HD session was 2/24/2025 prior to admission, states he makes slightly more than 1 cup of urine per day. Patient denies any acute complaints at this time, feels in his usual state of health.     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 1000 milliLiter(s) (100 mL/Hr) IV Continuous <Continuous>  dextrose 5%. 1000 milliLiter(s) (50 mL/Hr) IV Continuous <Continuous>  dextrose 50% Injectable 25 Gram(s) IV Push once  dextrose 50% Injectable 12.5 Gram(s) IV Push once  dextrose 50% Injectable 25 Gram(s) IV Push once  famotidine    Tablet 20 milliGRAM(s) Oral daily  glucagon  Injectable 1 milliGRAM(s) IntraMuscular once  heparin   Injectable 5000 Unit(s) SubCutaneous every 12 hours  insulin lispro (ADMELOG) corrective regimen sliding scale   SubCutaneous every 6 hours  mycophenolate mofetil 1 Gram(s) Oral every 12 hours  nystatin    Suspension 370929 Unit(s) Swish and Swallow four times a day  polyethylene glycol 3350 17 Gram(s) Oral daily  valGANciclovir 450 milliGRAM(s) Oral <User Schedule>    MEDICATIONS  (PRN):  dextrose Oral Gel 15 Gram(s) Oral once PRN Blood Glucose LESS THAN 70 milliGRAM(s)/deciliter  HYDROmorphone  Injectable 0.5 milliGRAM(s) IV Push every 10 minutes PRN Moderate Pain (4 - 6)  traMADol 25 milliGRAM(s) Oral every 8 hours PRN Moderate Pain (4 - 6)  traMADol 50 milliGRAM(s) Oral every 8 hours PRN Severe Pain (7 - 10)      PAST MEDICAL & SURGICAL HISTORY:  HTN (hypertension)      Type 2 diabetes mellitus      ESRD on dialysis  mwf      Dyslipidemia      S/P arteriovenous (AV) fistula creation          Vital Signs Last 24 Hrs  T(C): 36.4 (28 Feb 2025 17:10), Max: 36.8 (28 Feb 2025 13:02)  T(F): 97.5 (28 Feb 2025 17:10), Max: 98.2 (28 Feb 2025 13:02)  HR: 68 (28 Feb 2025 17:10) (68 - 107)  BP: 184/89 (28 Feb 2025 17:10) (139/65 - 184/89)  BP(mean): 113 (28 Feb 2025 16:00) (113 - 133)  RR: 16 (28 Feb 2025 17:10) (16 - 18)  SpO2: 98% (28 Feb 2025 17:10) (93% - 98%)    Parameters below as of 28 Feb 2025 17:10  Patient On (Oxygen Delivery Method): room air        I&O's Summary    27 Feb 2025 07:01  -  28 Feb 2025 07:00  --------------------------------------------------------  IN: 850 mL / OUT: 2496 mL / NET: -1646 mL    28 Feb 2025 07:01  -  28 Feb 2025 17:26  --------------------------------------------------------  IN: 0 mL / OUT: 220 mL / NET: -220 mL                              9.8    3.62  )-----------( 89       ( 28 Feb 2025 07:02 )             30.5     02-28    138  |  94[L]  |  61[H]  ----------------------------<  193[H]  5.2   |  24  |  9.87[H]    Ca    9.0      28 Feb 2025 06:59  Phos  6.5     02-28  Mg     2.5     02-28    TPro  7.4  /  Alb  3.8  /  TBili  0.3  /  DBili  x   /  AST  20  /  ALT  <5[L]  /  AlkPhos  88  02-28    Tacrolimus (), Serum: 3.4 ng/mL (02-28 @ 06:59)                  Review of systems  Gen: No weight changes, fatigue, fevers/chills, weakness  Skin: No rashes  Head/Eyes/Ears/Mouth: No headache; Normal hearing; Normal vision w/o blurriness; No sinus pain/discomfort, sore throat  Respiratory: No dyspnea, cough, wheezing, hemoptysis  CV: No chest pain, PND, orthopnea  GI: Mild abdominal pain at surgical incision site; denies diarrhea, constipation, nausea, vomiting, melena, hematochezia  : No increased frequency, dysuria, hematuria, nocturia  MSK: No joint pain/swelling; no back pain; no edema  Neuro: No dizziness/lightheadedness, weakness, seizures, numbness, tingling  Heme: No easy bruising or bleeding  Endo: No heat/cold intolerance  Psych: No significant nervousness, anxiety, stress, depression  All other systems were reviewed and are negative, except as noted.      PHYSICAL EXAM:  Constitutional: Well developed / well nourished  Eyes: Anicteric, PERRLA  ENMT: nc/at  Neck: supple  Respiratory: CTA B/L  Cardiovascular: RRR  Gastrointestinal: Soft, non distended, mild tenderness at the incision site; incision c/d/i; JULES x2: JULES 1 serous, JULES 2 SS   Genitourinary: Urinary catheter in place, low UO   Extremities: SCD's in place and working bilaterally, AVF LUE  Vascular: Palpable dp pulses bilaterally  Neurological: A&O x3  Skin: no rashes, ulcerations or lesions;  Musculoskeletal: Moving all extremities  Psychiatric: Responsive

## 2025-02-28 NOTE — ED ADULT NURSE NOTE - AGGRAVATING FACTORS
How Severe Is Your Rash?: severe Is This A New Presentation, Or A Follow-Up?: Follow Up Rash unknown

## 2025-02-28 NOTE — PROGRESS NOTE ADULT - ASSESSMENT
68 y/o M PMHX DM (retinopathy,neuropathy), HTN, anemia, chronic CHF, TTE w/ grade II diastolic dysfunction mild/mod MR and mild TR (2023), HLD, and CKD ESRD on HD since Jan 2023 via LUE AVF that presents as a direct admit for DDRT on 2/26. Transplant nephrology consulted for management of DDRT.     1. S/p DDRT:   Patient ESRD on Hemodialysis since Jan 2023 likely underlying kidney ds from long standing DKD. Patient's last HD session was 2/24/2025 prior to admission.  Patient underwent DDRt on 2/25/2025.   Right Kidney transplanted on the RLQ under steroid and Thymo induction;   ureteral stent x 1 placed; 1V / 2A anastomosed to single patch/ 1 U (stented)  CIT 36 hours 30 min  Scr elevated at 10.79, BUN 52 on 2/26. Electrolytes fair. Euvolemic on RA no pedal edema.  No urgent need for Hemodialysis today  Consent obtained from the patient and placed in the chart.  Patient with minimal UOp. Given IV lasix 80 mg IV 2/26. Minimal Uop  Hemodialysis on 2/27  Repeat Transplant US: renal transplant with patent vasculature and homogeneous flow. No evidence of a significant renal artery stenosis. Interval decrease in size of lower pole collection.    No urgent need for Hemodialysis today. Plan for Hemodialysis 3/1  Give lasix 80 mg IVP today  Monitor labs and UOP. Avoid any potential nephrotoxins (NSAIDs/ACEi/ARBs) when possible. Dose medications as per eGFR/HD.     2. Immunosuppression   -Envarsus 6mg today  -Monitor FK daily, per level moving forward  -MMF 1g PO BID  -Thymo 75mg / solumedrol 250mg today    3. HTN: on Nifedipine 30mg qd  4. DM: Insulin Admelog ISS

## 2025-02-28 NOTE — PROGRESS NOTE ADULT - ASSESSMENT
68 y/o M PMHX DM (retinopathy,neuropathy), HTN, anemia, chronic CHF, TTE w/ grade II diastolic dysfunction mild/mod MR and mild TR (2023), HLD, and CKD ESRD on HD since Jan 2023 via LUE AVF that presents as a direct admit for DDRT. Patient's last HD session was 2/24/2025 prior to admission, states he makes slightly more than 1 cup of urine per day. Patient denies any acute complaints at this time, feels in his usual state of health.   Patient is now s/p DDRT 2a on patch/1v/1u + stent on 2/25/2025 under Thymoglobulin induction    [] POD#3 DDRT  -DGF: HD 2/27   -Lasix 80mg IV x1  - strict I's & O's: JULES matosx2  - Renal Doppler: patent, small collection 4x2 cm  - SCDs/PT/IS  - SQH   - thymo 75/solu 60 today     []immuno   -Envarsus by level, MMF 1/1, SST   -ppx: nystatin, valcyte MWF      [] HTN  -c/w Nifedipine     [] DM  -ISS  -Podiatry consult (pt to miss scheduled appt tomorrow as outpt)

## 2025-02-28 NOTE — PROGRESS NOTE ADULT - SUBJECTIVE AND OBJECTIVE BOX
[facility] DIVISION OF KIDNEY DISEASES AND HYPERTENSION -- FOLLOW UP NOTE  --------------------------------------------------------------------------------  Chief Complaint:    24 hour events/subjective: Patient seen and examined earlier today. Feels well and has no complaints         PAST HISTORY  --------------------------------------------------------------------------------  No significant changes to PMH, PSH, FHx, SHx, unless otherwise noted    ALLERGIES & MEDICATIONS  --------------------------------------------------------------------------------  Allergies    No Known Allergies    Intolerances      Standing Inpatient Medications  chlorhexidine 2% Cloths 1 Application(s) Topical daily  dextrose 5%. 1000 milliLiter(s) IV Continuous <Continuous>  dextrose 5%. 1000 milliLiter(s) IV Continuous <Continuous>  dextrose 50% Injectable 25 Gram(s) IV Push once  dextrose 50% Injectable 12.5 Gram(s) IV Push once  dextrose 50% Injectable 25 Gram(s) IV Push once  famotidine    Tablet 20 milliGRAM(s) Oral daily  glucagon  Injectable 1 milliGRAM(s) IntraMuscular once  heparin   Injectable 5000 Unit(s) SubCutaneous every 12 hours  insulin lispro (ADMELOG) corrective regimen sliding scale   SubCutaneous every 6 hours  mycophenolate mofetil 1 Gram(s) Oral every 12 hours  nystatin    Suspension 460765 Unit(s) Swish and Swallow four times a day  polyethylene glycol 3350 17 Gram(s) Oral daily  valGANciclovir 450 milliGRAM(s) Oral <User Schedule>    PRN Inpatient Medications  dextrose Oral Gel 15 Gram(s) Oral once PRN  HYDROmorphone  Injectable 0.5 milliGRAM(s) IV Push every 10 minutes PRN  traMADol 25 milliGRAM(s) Oral every 8 hours PRN  traMADol 50 milliGRAM(s) Oral every 8 hours PRN      REVIEW OF SYSTEMS  --------------------------------------------------------------------------------  Gen: No fevers/chills  Respiratory: No dyspnea, cough,   CV: No chest pain, PND, orthopnea  GI: No abdominal pain, diarrhea, constipation, nausea, vomiting  Transplant: No pain  : No increased frequency, dysuria, hematuria   MSK: No edema  Neuro: No dizziness/lightheadedness    All other systems were reviewed and are negative, except as noted.    VITALS/PHYSICAL EXAM  --------------------------------------------------------------------------------  T(C): 36.4 (02-28-25 @ 17:10), Max: 36.8 (02-28-25 @ 13:02)  HR: 68 (02-28-25 @ 17:10) (68 - 96)  BP: 161/88 (02-28-25 @ 18:14) (139/65 - 184/89)  RR: 16 (02-28-25 @ 17:10) (16 - 18)  SpO2: 98% (02-28-25 @ 17:10) (94% - 98%)  Wt(kg): --    Weight (kg): 92.5 (02-28-25 @ 05:00)      02-27-25 @ 07:01  -  02-28-25 @ 07:00  --------------------------------------------------------  IN: 850 mL / OUT: 2496 mL / NET: -1646 mL    02-28-25 @ 07:01  -  02-28-25 @ 18:52  --------------------------------------------------------  IN: 420 mL / OUT: 273 mL / NET: 147 mL      Physical Exam:  Gen: NAD, able to speak in full sentences   HEENT: PERRL, MMM   Pulm: CTA B/L, no crackles   CV: RRR, S1S2+  Abd: +BS, soft  Transplant: No tenderness, swelling  : No suprapubic tenderness, JULES drains in place   MSK: no edema   Psych: Normal affect and mood  Skin: Warm  Access: Left AVF    LABS/STUDIES  --------------------------------------------------------------------------------              9.8    3.62  >-----------<  89       [02-28-25 @ 07:02]              30.5     138  |  94  |  61  ----------------------------<  193      [02-28-25 @ 06:59]  5.2   |  24  |  9.87        Ca     9.0     [02-28-25 @ 06:59]      Mg     2.5     [02-28-25 @ 06:59]      Phos  6.5     [02-28-25 @ 06:59]    TPro  7.4  /  Alb  3.8  /  TBili  0.3  /  DBili  x   /  AST  20  /  ALT  <5  /  AlkPhos  88  [02-28-25 @ 06:59]              [02-28-25 @ 06:59]    Creatinine Trend:  SCr 9.87 [02-28 @ 06:59]  SCr 13.07 [02-27 @ 12:48]  SCr 12.94 [02-27 @ 07:21]  SCr 10.79 [02-26 @ 05:09]  SCr 10.28 [02-25 @ 23:00]    Tacrolimus (), Serum: 3.4 ng/mL (02-28 @ 06:59)  Tacrolimus (), Serum: 4.2 ng/mL (02-27 @ 07:24)                HBsAb 11.3      [02-25-25 @ 00:35]  HBsAg Nonreact      [02-25-25 @ 00:35]  HBcAb Reactive      [02-25-25 @ 00:35]  HCV 0.05, Nonreact      [02-25-25 @ 00:35]  HIV Nonreact      [02-25-25 @ 00:34]

## 2025-03-01 DIAGNOSIS — T86.19 OTHER COMPLICATION OF KIDNEY TRANSPLANT: ICD-10-CM

## 2025-03-01 DIAGNOSIS — Z94.0 KIDNEY TRANSPLANT STATUS: ICD-10-CM

## 2025-03-01 DIAGNOSIS — D84.9 IMMUNODEFICIENCY, UNSPECIFIED: ICD-10-CM

## 2025-03-01 LAB
ALBUMIN SERPL ELPH-MCNC: 3.6 G/DL — SIGNIFICANT CHANGE UP (ref 3.3–5)
ALP SERPL-CCNC: 94 U/L — SIGNIFICANT CHANGE UP (ref 40–120)
ALT FLD-CCNC: <5 U/L — LOW (ref 10–45)
ANION GAP SERPL CALC-SCNC: 22 MMOL/L — HIGH (ref 5–17)
AST SERPL-CCNC: 11 U/L — SIGNIFICANT CHANGE UP (ref 10–40)
BILIRUB SERPL-MCNC: 0.3 MG/DL — SIGNIFICANT CHANGE UP (ref 0.2–1.2)
BUN SERPL-MCNC: 88 MG/DL — HIGH (ref 7–23)
CALCIUM SERPL-MCNC: 9 MG/DL — SIGNIFICANT CHANGE UP (ref 8.4–10.5)
CHLORIDE SERPL-SCNC: 97 MMOL/L — SIGNIFICANT CHANGE UP (ref 96–108)
CO2 SERPL-SCNC: 20 MMOL/L — LOW (ref 22–31)
CREAT SERPL-MCNC: 12.23 MG/DL — HIGH (ref 0.5–1.3)
EGFR: 4 ML/MIN/1.73M2 — LOW
EGFR: 4 ML/MIN/1.73M2 — LOW
GLUCOSE BLDC GLUCOMTR-MCNC: 118 MG/DL — HIGH (ref 70–99)
GLUCOSE BLDC GLUCOMTR-MCNC: 179 MG/DL — HIGH (ref 70–99)
GLUCOSE BLDC GLUCOMTR-MCNC: 208 MG/DL — HIGH (ref 70–99)
GLUCOSE BLDC GLUCOMTR-MCNC: 299 MG/DL — HIGH (ref 70–99)
GLUCOSE BLDC GLUCOMTR-MCNC: 323 MG/DL — HIGH (ref 70–99)
GLUCOSE SERPL-MCNC: 177 MG/DL — HIGH (ref 70–99)
HCT VFR BLD CALC: 29.9 % — LOW (ref 39–50)
HGB BLD-MCNC: 9.7 G/DL — LOW (ref 13–17)
MAGNESIUM SERPL-MCNC: 2.7 MG/DL — HIGH (ref 1.6–2.6)
MCHC RBC-ENTMCNC: 30.5 PG — SIGNIFICANT CHANGE UP (ref 27–34)
MCHC RBC-ENTMCNC: 32.4 G/DL — SIGNIFICANT CHANGE UP (ref 32–36)
MCV RBC AUTO: 94 FL — SIGNIFICANT CHANGE UP (ref 80–100)
NRBC BLD AUTO-RTO: 0 /100 WBCS — SIGNIFICANT CHANGE UP (ref 0–0)
PHOSPHATE SERPL-MCNC: 6.6 MG/DL — HIGH (ref 2.5–4.5)
PLATELET # BLD AUTO: 82 K/UL — LOW (ref 150–400)
POTASSIUM SERPL-MCNC: 5 MMOL/L — SIGNIFICANT CHANGE UP (ref 3.5–5.3)
POTASSIUM SERPL-SCNC: 5 MMOL/L — SIGNIFICANT CHANGE UP (ref 3.5–5.3)
PROT SERPL-MCNC: 7.2 G/DL — SIGNIFICANT CHANGE UP (ref 6–8.3)
RBC # BLD: 3.18 M/UL — LOW (ref 4.2–5.8)
RBC # FLD: 13.4 % — SIGNIFICANT CHANGE UP (ref 10.3–14.5)
SODIUM SERPL-SCNC: 139 MMOL/L — SIGNIFICANT CHANGE UP (ref 135–145)
TACROLIMUS SERPL-MCNC: 5.9 NG/ML — SIGNIFICANT CHANGE UP
WBC # BLD: 1.8 K/UL — LOW (ref 3.8–10.5)
WBC # FLD AUTO: 1.8 K/UL — LOW (ref 3.8–10.5)

## 2025-03-01 PROCEDURE — 99232 SBSQ HOSP IP/OBS MODERATE 35: CPT

## 2025-03-01 PROCEDURE — 76776 US EXAM K TRANSPL W/DOPPLER: CPT | Mod: 26,RT

## 2025-03-01 RX ORDER — PREDNISONE 20 MG/1
40 TABLET ORAL ONCE
Refills: 0 | Status: COMPLETED | OUTPATIENT
Start: 2025-03-01 | End: 2025-03-01

## 2025-03-01 RX ORDER — FLUCONAZOLE 150 MG
200 TABLET ORAL DAILY
Refills: 0 | Status: DISCONTINUED | OUTPATIENT
Start: 2025-03-01 | End: 2025-03-04

## 2025-03-01 RX ORDER — PREDNISONE 20 MG/1
20 TABLET ORAL ONCE
Refills: 0 | Status: COMPLETED | OUTPATIENT
Start: 2025-03-02 | End: 2025-03-02

## 2025-03-01 RX ORDER — PREDNISONE 20 MG/1
TABLET ORAL
Refills: 0 | Status: DISCONTINUED | OUTPATIENT
Start: 2025-03-01 | End: 2025-03-02

## 2025-03-01 RX ORDER — NIFEDIPINE 30 MG
30 TABLET, EXTENDED RELEASE 24 HR ORAL DAILY
Refills: 0 | Status: DISCONTINUED | OUTPATIENT
Start: 2025-03-01 | End: 2025-03-01

## 2025-03-01 RX ADMIN — MYCOPHENOLATE MOFETIL 1 GRAM(S): 500 TABLET, FILM COATED ORAL at 19:54

## 2025-03-01 RX ADMIN — HEPARIN SODIUM 5000 UNIT(S): 1000 INJECTION INTRAVENOUS; SUBCUTANEOUS at 06:15

## 2025-03-01 RX ADMIN — INSULIN LISPRO 2: 100 INJECTION, SOLUTION INTRAVENOUS; SUBCUTANEOUS at 06:19

## 2025-03-01 RX ADMIN — TRAMADOL HYDROCHLORIDE 50 MILLIGRAM(S): 50 TABLET, FILM COATED ORAL at 08:54

## 2025-03-01 RX ADMIN — Medication 500000 UNIT(S): at 00:02

## 2025-03-01 RX ADMIN — TRAMADOL HYDROCHLORIDE 25 MILLIGRAM(S): 50 TABLET, FILM COATED ORAL at 13:23

## 2025-03-01 RX ADMIN — TRAMADOL HYDROCHLORIDE 25 MILLIGRAM(S): 50 TABLET, FILM COATED ORAL at 14:23

## 2025-03-01 RX ADMIN — MYCOPHENOLATE MOFETIL 1 GRAM(S): 500 TABLET, FILM COATED ORAL at 07:55

## 2025-03-01 RX ADMIN — Medication 200 MILLIGRAM(S): at 16:38

## 2025-03-01 RX ADMIN — INSULIN LISPRO 8: 100 INJECTION, SOLUTION INTRAVENOUS; SUBCUTANEOUS at 00:16

## 2025-03-01 RX ADMIN — Medication 20 MILLIGRAM(S): at 12:32

## 2025-03-01 RX ADMIN — Medication 1 APPLICATION(S): at 12:33

## 2025-03-01 RX ADMIN — Medication 500000 UNIT(S): at 06:15

## 2025-03-01 RX ADMIN — TRAMADOL HYDROCHLORIDE 50 MILLIGRAM(S): 50 TABLET, FILM COATED ORAL at 07:54

## 2025-03-01 RX ADMIN — HEPARIN SODIUM 5000 UNIT(S): 1000 INJECTION INTRAVENOUS; SUBCUTANEOUS at 16:38

## 2025-03-01 RX ADMIN — Medication 500000 UNIT(S): at 12:31

## 2025-03-01 RX ADMIN — TACROLIMUS 6 MILLIGRAM(S): 0.5 CAPSULE ORAL at 07:55

## 2025-03-01 RX ADMIN — Medication 10 MILLIGRAM(S): at 01:14

## 2025-03-01 RX ADMIN — PREDNISONE 40 MILLIGRAM(S): 20 TABLET ORAL at 12:31

## 2025-03-01 NOTE — PROGRESS NOTE ADULT - ASSESSMENT
70 y/o M PMHX DM (retinopathy,neuropathy), HTN, anemia, chronic CHF, TTE w/ grade II diastolic dysfunction mild/mod MR and mild TR (2023), HLD, and CKD ESRD on HD since Jan 2023 via LUE AVF that presents as a direct admit for DDRT. Patient's last HD session was 2/24/2025 prior to admission, states he makes slightly more than 1 cup of urine per day. Patient denies any acute complaints at this time, feels in his usual state of health.   Patient is now s/p DDRT 2a on patch/1v/1u + stent on 2/25/2025 under Thymoglobulin induction    [] POD#4 DDRT  -DGF: HD 2/27, 3/1  - strict I's & O's: Surekha matos  - Renal Doppler: patent, small collection 4x2 cm  - SCDs/PT/IS  - SQH   - holding thymo for low white count   - RLQ swelling near txp site, repeat doppler ordered    []immuno   -Envarsus by level, MMF 1/1, SST   -ppx: nystatin, valcyte MWF      [] HTN  -c/w Nifedipine     [] DM  -ISS  -Podiatry consult (pt to miss scheduled appt tomorrow as outpt)

## 2025-03-01 NOTE — PROGRESS NOTE ADULT - SUBJECTIVE AND OBJECTIVE BOX
DDRT, post transplant, Thymo induction, DGF,    S: No new /acute symptoms    O:    MEDICATIONS  (STANDING):  chlorhexidine 2% Cloths 1 Application(s) Topical daily  dextrose 5%. 1000 milliLiter(s) (50 mL/Hr) IV Continuous <Continuous>  dextrose 5%. 1000 milliLiter(s) (100 mL/Hr) IV Continuous <Continuous>  dextrose 50% Injectable 25 Gram(s) IV Push once  dextrose 50% Injectable 12.5 Gram(s) IV Push once  dextrose 50% Injectable 25 Gram(s) IV Push once  famotidine    Tablet 20 milliGRAM(s) Oral daily  fluconAZOLE   Tablet 200 milliGRAM(s) Oral daily  glucagon  Injectable 1 milliGRAM(s) IntraMuscular once  heparin   Injectable 5000 Unit(s) SubCutaneous every 12 hours  insulin lispro (ADMELOG) corrective regimen sliding scale   SubCutaneous every 6 hours  mycophenolate mofetil 1 Gram(s) Oral every 12 hours  polyethylene glycol 3350 17 Gram(s) Oral daily  predniSONE   Tablet   Oral   tacrolimus ER Tablet (ENVARSUS XR) 6 milliGRAM(s) Oral <User Schedule>  valGANciclovir 450 milliGRAM(s) Oral <User Schedule>    MEDICATIONS  (PRN):  dextrose Oral Gel 15 Gram(s) Oral once PRN Blood Glucose LESS THAN 70 milliGRAM(s)/deciliter  HYDROmorphone  Injectable 0.5 milliGRAM(s) IV Push every 10 minutes PRN Moderate Pain (4 - 6)  traMADol 25 milliGRAM(s) Oral every 8 hours PRN Moderate Pain (4 - 6)  traMADol 50 milliGRAM(s) Oral every 8 hours PRN Severe Pain (7 - 10)    VS noted I/O noted    On Exam;  No distress, not dyspnoeic  HENT: No acute signs  Neck: no JVD  Chest/CV: no acute signs  Abd: healing incision, drain noted  Last catheter noted  Ext; no acute signs  Neuro: no acute/ focal signs      LABS/STUDIES  --------------------------------------------------------------------------------              9.7    1.80  >-----------<  82       [03-01-25 @ 07:25]              29.9     139  |  97  |  88  ----------------------------<  177      [03-01-25 @ 07:24]  5.0   |  20  |  12.23        Ca     9.0     [03-01-25 @ 07:24]      Mg     2.7     [03-01-25 @ 07:24]      Phos  6.6     [03-01-25 @ 07:24]    TPro  7.2  /  Alb  3.6  /  TBili  0.3  /  DBili  x   /  AST  11  /  ALT  <5  /  AlkPhos  94  [03-01-25 @ 07:24]              [02-28-25 @ 06:59]    Creatinine Trend:  SCr 12.23 [03-01 @ 07:24]  SCr 9.87 [02-28 @ 06:59]  SCr 13.07 [02-27 @ 12:48]  SCr 12.94 [02-27 @ 07:21]  SCr 10.79 [02-26 @ 05:09]    Tacrolimus (), Serum: 5.9 ng/mL (03-01 @ 07:26)  Tacrolimus (), Serum: 3.4 ng/mL (02-28 @ 06:59)  Tacrolimus (), Serum: 4.2 ng/mL (02-27 @ 07:24)    < from: US Trans Kidney w/ Doppler, Right (03.01.25 @ 15:54) >  Renal Transplant: 11.1 cm. No renal mass, hydronephrosis or calculi.   There is a small collection of fluid along the inferior margin of the   transplant, measuring 1.5 x 1.4 x 1.2 cm, previously 1.8 x 1.6 x 1.3 cm.   No other fluid collections are identified. A perinephric drain is noted.  Urinary bladder: Collapsed around Last catheter    Color and spectral Doppler reveals homogeneous flow throughout the   transplant.    Peak iliac artery velocity is 156.5 cm/sec pre-anastomosis, 93.1 cm/sec   at the anastomosis, and 119.5 cm/sec post anastomosis.    Transplant Renal Artery: As per operative note, there are 2 renal   arteries anastomosed to a single patch.    Superior renal artery:  Peak systolic velocity is:  86.7 cm/s anastomosis,  75.8 cm/sec proximal,   41.3 cm/sec mid, 52.3 cm/sec distal and 49.0 cm/sec hilum.    Inferior renal artery:  Peak systolic velocity is: 43.6 cm/s anastomosis, 54.6 cm/sec proximal,   68.9 cm/sec mid, 43.5 cm/sec distal and 28.7 cm/sec hilum.    Resistive Indices Range: 0.62 to 0.76    Transplant Renal Vein: Patent.    IMPRESSION:    1. Status post renal transplant in the right lower quadrant. The   transplant vasculature is patent, as detailed above. Overall, the peak   velocities within the transplant renal arteries are decreased compared to   the prior exams, of uncertain significance. Continued follow-up is   recommended.  2. There is a small fluid collection adjacent to the lower pole of the   transplant which appears grossly unchanged compared to the prior exam. No   other perinephric fluid collections are identified.    < end of copied text >

## 2025-03-01 NOTE — PROGRESS NOTE ADULT - SUBJECTIVE AND OBJECTIVE BOX
Transplant Surgery - Multidisciplinary rounds   --------------------------------------------------------------   Tx Date:  2/25/2025       POD#4    HPI: 68 y/o M PMHX DM (retinopathy,neuropathy), HTN, anemia, chronic CHF, TTE w/ grade II diastolic dysfunction mild/mod MR and mild TR (2023), HLD, and CKD ESRD on HD since Jan 2023 via LUE AVF that presents as a direct admit for DDRT. Patient's last HD session was 2/24/2025 prior to admission, states he makes slightly more than 1 cup of urine per day. Patient denies any acute complaints at this time, feels in his usual state of health.     Patient is now s/p DDRT 2a on patch/1v/1u + stent on 2/25/2025 under Thymoglobulin induction    Interval Events:  -afebrile vss  - Cr uptrending  - UOP still poor  - ON: thymo slowed rate 2/2 htn, however completed appropriately     Immunosuppression  Induction: Thymo  Maintenance: FK/MMF/SST  Ongoing monitoring for signs of rejection     Potential Discharge date: TBD  Education:  Medications  Plan of care:  See Below        MEDICATIONS  (STANDING):  chlorhexidine 2% Cloths 1 Application(s) Topical daily  dextrose 5%. 1000 milliLiter(s) (100 mL/Hr) IV Continuous <Continuous>  dextrose 5%. 1000 milliLiter(s) (50 mL/Hr) IV Continuous <Continuous>  dextrose 50% Injectable 25 Gram(s) IV Push once  dextrose 50% Injectable 25 Gram(s) IV Push once  dextrose 50% Injectable 12.5 Gram(s) IV Push once  famotidine    Tablet 20 milliGRAM(s) Oral daily  glucagon  Injectable 1 milliGRAM(s) IntraMuscular once  heparin   Injectable 5000 Unit(s) SubCutaneous every 12 hours  insulin lispro (ADMELOG) corrective regimen sliding scale   SubCutaneous every 6 hours  mycophenolate mofetil 1 Gram(s) Oral every 12 hours  nystatin    Suspension [id number] Unit(s) Swish and Swallow four times a day  polyethylene glycol 3350 17 Gram(s) Oral daily  predniSONE   Tablet   Oral   tacrolimus ER Tablet (ENVARSUS XR) 6 milliGRAM(s) Oral <User Schedule>  valGANciclovir 450 milliGRAM(s) Oral <User Schedule>    MEDICATIONS  (PRN):  dextrose Oral Gel 15 Gram(s) Oral once PRN Blood Glucose LESS THAN 70 milliGRAM(s)/deciliter  HYDROmorphone  Injectable 0.5 milliGRAM(s) IV Push every 10 minutes PRN Moderate Pain (4 - 6)  traMADol 50 milliGRAM(s) Oral every 8 hours PRN Severe Pain (7 - 10)  traMADol 25 milliGRAM(s) Oral every 8 hours PRN Moderate Pain (4 - 6)      PAST MEDICAL & SURGICAL HISTORY:  HTN (hypertension)      Type 2 diabetes mellitus      ESRD on dialysis  mwf      Dyslipidemia      S/P arteriovenous (AV) fistula creation          Vital Signs Last 24 Hrs  T(C): 36.4 (01 Mar 2025 11:30), Max: 36.8 (28 Feb 2025 14:29)  T(F): 97.6 (01 Mar 2025 11:30), Max: 98.2 (28 Feb 2025 14:29)  HR: 72 (01 Mar 2025 11:30) (68 - 88)  BP: 174/88 (01 Mar 2025 11:30) (121/80 - 188/92)  BP(mean): 122 (01 Mar 2025 11:30) (95 - 133)  RR: 17 (01 Mar 2025 11:30) (16 - 18)  SpO2: 95% (01 Mar 2025 11:30) (93% - 98%)    Parameters below as of 01 Mar 2025 11:30  Patient On (Oxygen Delivery Method): room air        I&O's Summary    28 Feb 2025 07:01  -  01 Mar 2025 07:00  --------------------------------------------------------  IN: 1076 mL / OUT: 566 mL / NET: 510 mL    01 Mar 2025 07:01  -  01 Mar 2025 13:18  --------------------------------------------------------  IN: 0 mL / OUT: 60 mL / NET: -60 mL                              9.7    1.80  )-----------( 82       ( 01 Mar 2025 07:25 )             29.9     03-01    139  |  97  |  88[H]  ----------------------------<  177[H]  5.0   |  20[L]  |  12.23[H]    Ca    9.0      01 Mar 2025 07:24  Phos  6.6     03-01  Mg     2.7     03-01    TPro  7.2  /  Alb  3.6  /  TBili  0.3  /  DBili  x   /  AST  11  /  ALT  <5[L]  /  AlkPhos  94  03-01    Tacrolimus (), Serum: 5.9 ng/mL (03-01 @ 07:26)                    Review of systems  Gen: No weight changes, fatigue, fevers/chills, weakness  Skin: No rashes  Head/Eyes/Ears/Mouth: No headache; Normal hearing; Normal vision w/o blurriness; No sinus pain/discomfort, sore throat  Respiratory: No dyspnea, cough, wheezing, hemoptysis  CV: No chest pain, PND, orthopnea  GI: Mild abdominal pain at surgical incision site; denies diarrhea, constipation, nausea, vomiting, melena, hematochezia  : No increased frequency, dysuria, hematuria, nocturia  MSK: No joint pain/swelling; no back pain; no edema  Neuro: No dizziness/lightheadedness, weakness, seizures, numbness, tingling  Heme: No easy bruising or bleeding  Endo: No heat/cold intolerance  Psych: No significant nervousness, anxiety, stress, depression  All other systems were reviewed and are negative, except as noted.      PHYSICAL EXAM:  Constitutional: Well developed / well nourished  Eyes: Anicteric, PERRLA  ENMT: nc/at  Neck: supple  Respiratory: CTA B/L  Cardiovascular: RRR  Gastrointestinal: Soft, non distended, mild tenderness at the incision site; incision c/d/i; JULES x2: JULES 1 serous, JULES 2 SS   Genitourinary: Urinary catheter in place, low UO   Extremities: SCD's in place and working bilaterally, AVF LUE  Vascular: Palpable dp pulses bilaterally  Neurological: A&O x3  Skin: no rashes, ulcerations or lesions;  Musculoskeletal: Moving all extremities  Psychiatric: Responsive

## 2025-03-02 LAB
ALBUMIN SERPL ELPH-MCNC: 3.3 G/DL — SIGNIFICANT CHANGE UP (ref 3.3–5)
ALP SERPL-CCNC: 76 U/L — SIGNIFICANT CHANGE UP (ref 40–120)
ALT FLD-CCNC: <5 U/L — LOW (ref 10–45)
ANION GAP SERPL CALC-SCNC: 19 MMOL/L — HIGH (ref 5–17)
AST SERPL-CCNC: 9 U/L — LOW (ref 10–40)
BILIRUB SERPL-MCNC: 0.3 MG/DL — SIGNIFICANT CHANGE UP (ref 0.2–1.2)
BUN SERPL-MCNC: 65 MG/DL — HIGH (ref 7–23)
CALCIUM SERPL-MCNC: 8.8 MG/DL — SIGNIFICANT CHANGE UP (ref 8.4–10.5)
CHLORIDE SERPL-SCNC: 99 MMOL/L — SIGNIFICANT CHANGE UP (ref 96–108)
CO2 SERPL-SCNC: 22 MMOL/L — SIGNIFICANT CHANGE UP (ref 22–31)
CREAT SERPL-MCNC: 9.56 MG/DL — HIGH (ref 0.5–1.3)
EGFR: 5 ML/MIN/1.73M2 — LOW
EGFR: 5 ML/MIN/1.73M2 — LOW
GLUCOSE BLDC GLUCOMTR-MCNC: 147 MG/DL — HIGH (ref 70–99)
GLUCOSE BLDC GLUCOMTR-MCNC: 171 MG/DL — HIGH (ref 70–99)
GLUCOSE BLDC GLUCOMTR-MCNC: 272 MG/DL — HIGH (ref 70–99)
GLUCOSE SERPL-MCNC: 132 MG/DL — HIGH (ref 70–99)
HCT VFR BLD CALC: 28.7 % — LOW (ref 39–50)
HGB BLD-MCNC: 9.3 G/DL — LOW (ref 13–17)
MAGNESIUM SERPL-MCNC: 2.4 MG/DL — SIGNIFICANT CHANGE UP (ref 1.6–2.6)
MCHC RBC-ENTMCNC: 30.8 PG — SIGNIFICANT CHANGE UP (ref 27–34)
MCHC RBC-ENTMCNC: 32.4 G/DL — SIGNIFICANT CHANGE UP (ref 32–36)
MCV RBC AUTO: 95 FL — SIGNIFICANT CHANGE UP (ref 80–100)
NRBC BLD AUTO-RTO: 0 /100 WBCS — SIGNIFICANT CHANGE UP (ref 0–0)
PHOSPHATE SERPL-MCNC: 6.1 MG/DL — HIGH (ref 2.5–4.5)
PLATELET # BLD AUTO: 88 K/UL — LOW (ref 150–400)
POTASSIUM SERPL-MCNC: 4.9 MMOL/L — SIGNIFICANT CHANGE UP (ref 3.5–5.3)
POTASSIUM SERPL-SCNC: 4.9 MMOL/L — SIGNIFICANT CHANGE UP (ref 3.5–5.3)
PROT SERPL-MCNC: 6.6 G/DL — SIGNIFICANT CHANGE UP (ref 6–8.3)
RBC # BLD: 3.02 M/UL — LOW (ref 4.2–5.8)
RBC # FLD: 13.3 % — SIGNIFICANT CHANGE UP (ref 10.3–14.5)
SODIUM SERPL-SCNC: 140 MMOL/L — SIGNIFICANT CHANGE UP (ref 135–145)
TACROLIMUS SERPL-MCNC: 3.6 NG/ML — SIGNIFICANT CHANGE UP
WBC # BLD: 1.99 K/UL — LOW (ref 3.8–10.5)
WBC # FLD AUTO: 1.99 K/UL — LOW (ref 3.8–10.5)

## 2025-03-02 PROCEDURE — 99232 SBSQ HOSP IP/OBS MODERATE 35: CPT

## 2025-03-02 RX ORDER — MYCOPHENOLATE MOFETIL 500 MG/1
500 TABLET, FILM COATED ORAL
Refills: 0 | Status: DISCONTINUED | OUTPATIENT
Start: 2025-03-02 | End: 2025-03-04

## 2025-03-02 RX ORDER — DIPHENHYDRAMINE HCL 12.5MG/5ML
50 ELIXIR ORAL ONCE
Refills: 0 | Status: COMPLETED | OUTPATIENT
Start: 2025-03-02 | End: 2025-03-02

## 2025-03-02 RX ORDER — ACETAMINOPHEN 500 MG/5ML
650 LIQUID (ML) ORAL ONCE
Refills: 0 | Status: COMPLETED | OUTPATIENT
Start: 2025-03-02 | End: 2025-03-02

## 2025-03-02 RX ORDER — MYCOPHENOLATE MOFETIL 500 MG/1
500 TABLET, FILM COATED ORAL EVERY 12 HOURS
Refills: 0 | Status: DISCONTINUED | OUTPATIENT
Start: 2025-03-02 | End: 2025-03-02

## 2025-03-02 RX ORDER — METHYLPREDNISOLONE ACETATE 80 MG/ML
40 INJECTION, SUSPENSION INTRA-ARTICULAR; INTRALESIONAL; INTRAMUSCULAR; SOFT TISSUE ONCE
Refills: 0 | Status: COMPLETED | OUTPATIENT
Start: 2025-03-02 | End: 2025-03-02

## 2025-03-02 RX ORDER — FUROSEMIDE 10 MG/ML
80 INJECTION INTRAMUSCULAR; INTRAVENOUS ONCE
Refills: 0 | Status: COMPLETED | OUTPATIENT
Start: 2025-03-02 | End: 2025-03-02

## 2025-03-02 RX ORDER — TAMSULOSIN HYDROCHLORIDE 0.4 MG/1
0.4 CAPSULE ORAL AT BEDTIME
Refills: 0 | Status: DISCONTINUED | OUTPATIENT
Start: 2025-03-02 | End: 2025-03-04

## 2025-03-02 RX ADMIN — Medication 50 MILLIGRAM(S): at 16:36

## 2025-03-02 RX ADMIN — MYCOPHENOLATE MOFETIL 1 GRAM(S): 500 TABLET, FILM COATED ORAL at 09:23

## 2025-03-02 RX ADMIN — Medication 650 MILLIGRAM(S): at 16:36

## 2025-03-02 RX ADMIN — TACROLIMUS 6 MILLIGRAM(S): 0.5 CAPSULE ORAL at 09:22

## 2025-03-02 RX ADMIN — HEPARIN SODIUM 5000 UNIT(S): 1000 INJECTION INTRAVENOUS; SUBCUTANEOUS at 16:41

## 2025-03-02 RX ADMIN — Medication 10 MILLIGRAM(S): at 06:01

## 2025-03-02 RX ADMIN — Medication 20 MILLIGRAM(S): at 12:38

## 2025-03-02 RX ADMIN — METHYLPREDNISOLONE ACETATE 40 MILLIGRAM(S): 80 INJECTION, SUSPENSION INTRA-ARTICULAR; INTRALESIONAL; INTRAMUSCULAR; SOFT TISSUE at 16:36

## 2025-03-02 RX ADMIN — TRAMADOL HYDROCHLORIDE 25 MILLIGRAM(S): 50 TABLET, FILM COATED ORAL at 06:01

## 2025-03-02 RX ADMIN — INSULIN LISPRO 2: 100 INJECTION, SOLUTION INTRAVENOUS; SUBCUTANEOUS at 12:39

## 2025-03-02 RX ADMIN — FUROSEMIDE 80 MILLIGRAM(S): 10 INJECTION INTRAMUSCULAR; INTRAVENOUS at 10:32

## 2025-03-02 RX ADMIN — TRAMADOL HYDROCHLORIDE 25 MILLIGRAM(S): 50 TABLET, FILM COATED ORAL at 07:00

## 2025-03-02 RX ADMIN — HEPARIN SODIUM 5000 UNIT(S): 1000 INJECTION INTRAVENOUS; SUBCUTANEOUS at 05:37

## 2025-03-02 RX ADMIN — TRAMADOL HYDROCHLORIDE 50 MILLIGRAM(S): 50 TABLET, FILM COATED ORAL at 07:29

## 2025-03-02 RX ADMIN — INSULIN LISPRO 6: 100 INJECTION, SOLUTION INTRAVENOUS; SUBCUTANEOUS at 00:10

## 2025-03-02 RX ADMIN — Medication 1 APPLICATION(S): at 12:38

## 2025-03-02 RX ADMIN — INSULIN LISPRO 6: 100 INJECTION, SOLUTION INTRAVENOUS; SUBCUTANEOUS at 17:44

## 2025-03-02 RX ADMIN — TAMSULOSIN HYDROCHLORIDE 0.4 MILLIGRAM(S): 0.4 CAPSULE ORAL at 21:00

## 2025-03-02 RX ADMIN — PREDNISONE 20 MILLIGRAM(S): 20 TABLET ORAL at 12:38

## 2025-03-02 RX ADMIN — MYCOPHENOLATE MOFETIL 500 MILLIGRAM(S): 500 TABLET, FILM COATED ORAL at 21:00

## 2025-03-02 RX ADMIN — Medication 200 MILLIGRAM(S): at 12:37

## 2025-03-02 NOTE — PROGRESS NOTE ADULT - ASSESSMENT
68 y/o M PMHX DM (retinopathy,neuropathy), HTN, anemia, chronic CHF, TTE w/ grade II diastolic dysfunction mild/mod MR and mild TR (2023), HLD, and CKD ESRD on HD since Jan 2023 via LUE AVF that presents as a direct admit for DDRT. Patient's last HD session was 2/24/2025 prior to admission, states he makes slightly more than 1 cup of urine per day. Patient denies any acute complaints at this time, feels in his usual state of health.   Patient is now s/p DDRT 2a on patch/1v/1u + stent on 2/25/2025 under Thymoglobulin induction    [] POD#5 DDRT  -DGF: HD 2/27, 3/1  - strict I's & O's: Surekha matos  - Renal Doppler: patent, small collection 4x2 cm  - SCDs/PT/IS  - SQH   - holding thymo for low white count   - RLQ swelling near txp site, repeat doppler ordered    []immuno   -Envarsus by level, MMF 1/1, SST   -ppx: nystatin, valcyte MWF      [] HTN  -c/w Nifedipine     [] DM  -ISS  -Podiatry consult (pt to miss scheduled appt tomorrow as outpt)   68 y/o M PMHX DM (retinopathy,neuropathy), HTN, anemia, chronic CHF, TTE w/ grade II diastolic dysfunction mild/mod MR and mild TR (2023), HLD, and CKD ESRD on HD since Jan 2023 via LUE AVF that presents as a direct admit for DDRT. Patient's last HD session was 2/24/2025 prior to admission, states he makes slightly more than 1 cup of urine per day. Patient denies any acute complaints at this time, feels in his usual state of health.   Patient is now s/p DDRT 2a on patch/1v/1u + stent on 2/25/2025 under Thymoglobulin induction    [] POD#5 DDRT  -DGF: HD 2/27, 3/1  - strict I's & O's: d/c matos, f/u TOV   - Renal Doppler: patent, small collection 4x2 cm  - SCDs/PT/IS  - SQH   - thymo 75 and Pred 40   - RLQ swelling near txp site, 3/1 rpt US kidney showed no change in fluid collection from prior   - lasix PRN   -flomax     []immuno   -Envarsus by level,  BID, SST   -ppx: nystatin, valcyte MWF      [] HTN  -c/w Nifedipine     [] DM  -ISS  -Podiatry consult (pt to miss scheduled appt tomorrow as outpt)

## 2025-03-02 NOTE — CHART NOTE - NSCHARTNOTEFT_GEN_A_CORE
NUTRITION FOLLOW UP NOTE    PATIENT SEEN FOR:  Pt seen for post kidney transplant recipient nutrition follow up.    SOURCE: [X] Patient  [x] Current Medical Record  [] RN  [] Family/support person at bedside  [] Patient unavailable/inappropriate  [] Other:    CHART REVIEWED/EVENTS NOTED.  [] No changes to nutrition care plan to note  [X] Nutrition Status:  - s/p DDRT 25 POD#5  - DGF, s/p iHD  and 3/1     DIET ORDER:   Diet, Consistent Carbohydrate Renal w/Evening Snack (25)    CURRENT DIET ORDER IS:  [X] Appropriate:  [] Inadequate:  [] Other:    NUTRITION INTAKE/PROVISION:  [X] PO: good appetite and PO intakes post transplant   [] Enteral Nutrition:  [] Parenteral Nutrition:    KIDNEY TRANSPLANT:   - Txp Meds: Thymoglobulin, Deltasone, Tacrolimus, Cellcept   - BG:  insulin sliding scale for blood glucose control   - UOP: Urine output per flow sheets 35 ml thus far (3/02), 211 ml (3/01), 248 ml ()   - Low urine out put noted, likely related to delayed graft function (DGF), will monitor urine output during present admission.   -  Provide pt with  post-transplant nutrition education during initial visit; Pt able to teach back 1-2 points of education previously provided     ANTHROPOMETRICS:  Drug Dosing Weight  Height (cm): 175.3 (2025 10:37)  Weight (kg): 89.9 (02 Mar 2025 05:42)  BMI (kg/m2): 29.3 (02 Mar 2025 05:42)  Weights:   Daily Weight in k.9 (), Weight in k.9 (), Weight in k.9 (), Weight in k.9 (), Weight in k.5 (), Weight in k.9 (), Weight in k.9 ()   - Weight fluctuations in setting of fluid shifts (IVF,  iHD, intraoperative fluids). Will continue to monitor weight trends as available/able.     NUTRITIONALLY PERTINENT MEDICATIONS  (STANDING):  antithymocyte globulin rabbit IVPB 75 milliGRAM(s) IV Intermittent once  dextrose 5%. 1000 milliLiter(s) (100 mL/Hr) IV Continuous <Continuous>  dextrose 5%. 1000 milliLiter(s) (50 mL/Hr) IV Continuous <Continuous>  dextrose 50% Injectable 25 Gram(s) IV Push once  dextrose 50% Injectable 12.5 Gram(s) IV Push once  dextrose 50% Injectable 25 Gram(s) IV Push once  famotidine    Tablet 20 milliGRAM(s) Oral daily  fluconAZOLE   Tablet 200 milliGRAM(s) Oral daily  glucagon  Injectable 1 milliGRAM(s) IntraMuscular once  heparin   Injectable 5000 Unit(s) SubCutaneous every 12 hours  insulin lispro (ADMELOG) corrective regimen sliding scale   SubCutaneous every 6 hours  mycophenolate mofetil 500 milliGRAM(s) Oral <User Schedule>  polyethylene glycol 3350 17 Gram(s) Oral daily  predniSONE   Tablet   Oral   tacrolimus ER Tablet (ENVARSUS XR) 6 milliGRAM(s) Oral <User Schedule>  tamsulosin 0.4 milliGRAM(s) Oral at bedtime  valGANciclovir 450 milliGRAM(s) Oral <User Schedule>    MEDICATIONS  (PRN):  dextrose Oral Gel 15 Gram(s) Oral once PRN Blood Glucose LESS THAN 70 milliGRAM(s)/deciliter  HYDROmorphone  Injectable 0.5 milliGRAM(s) IV Push every 10 minutes PRN Moderate Pain (4 - 6)  traMADol 25 milliGRAM(s) Oral every 8 hours PRN Moderate Pain (4 - 6)  traMADol 50 milliGRAM(s) Oral every 8 hours PRN Severe Pain (7 - 10)         NUTRITIONALLY PERTINENT LABS:   Na140 mmol/L Glu 132 mg/dL[H] K+ 4.9 mmol/L Cr  9.56 mg/dL[H] BUN 65 mg/dL[H]  Phos 6.1 mg/dL[H]  Alb 3.3 g/dL ALT <5 U/L[L] AST 9 U/L[L] Alkaline Phosphatase 76 U/L    A1C with Estimated Average Glucose Result: 5.9 % (25 @ 07:57)  A1C with Estimated Average Glucose Result: 5.8 % (25 @ 05:09)      Finger Sticks:  POCT Blood Glucose.: 171 mg/dL ( @ 12:28)  POCT Blood Glucose.: 147 mg/dL ( @ 05:39)  POCT Blood Glucose.: 299 mg/dL ( @ 23:56)  POCT Blood Glucose.: 208 mg/dL ( @ 16:25)  POCT Blood Glucose.: 118 mg/dL ( @ 13:24)      NUTRITIONALLY PERTINENT MEDICATIONS/LABS:  [x] Reviewed  [X] Relevant notes on medications/labs:  - Hyperphosphatemia     EDEMA:  [x] Reviewed  [X] Relevant notes: +2 edema to bilateral ankle and feet per flow sheets    GI/ I&O:  [x] Reviewed  [X] Relevant notes: last bowel movement on 3/1/25 per flow sheets; ordered fo Miralax   [] Other:    SKIN:   [X] No pressure injuries documented, per nursing flowsheet  [] Pressure injury previously noted  [] Change in pressure injury documentation:  [] Other:    ESTIMATED NEEDS:  [X] No change:  [] Updated:  Energy:   kcal/day (xx-xx kcal/kg)  Protein:   g/day (xx-xx g/kg)  Fluid:   ml/day or [] defer to team  Based on:    NUTRITION DIAGNOSIS:  [X] Prior Dx: Increased Nutrient Needs; Food and Nutrition Related Knowledge Deficit   [] New Dx:    EDUCATION:  [X] Yes: Reviewed post-transplant nutrition therapy and food safety guidelines for transplant recipients.  Reviewed recommendations to avoid grapefruit, pomegranate and star fruit while taking immunosuppressant medication.  Reviewed recommendations for moderate intake of sodium with transplant medications.  Reviewed Consistent Carbohydrate diet, including carbohydrate content of foods and portion sizes.  [] Not appropriate/warranted    NUTRITION CARE PLAN:  1. Diet: Continue Consistent Carbohydrate Renal therapeutic dietary restrictions   2. Supplements: None at this time   3. Multivitamin/mineral supplementation: Consider adding Nephro-Kaylyn 1x/Day for micronutrient needs  4. RD remains available to reinforce post-transplant nutrition therapy and food safety guidelines in-house and prior to discharge.   5. Discharge diet: Continue as above. Recommend follow up visit with Transplant MD and outpatient RD for dietary modifications as warranted.  6. Monitor PO intake,  Urine Output, GI tolerance, skin integrity,and labs.     [] Achieved - Continue current nutrition intervention(s)  [] Current medical condition precludes nutrition intervention at this time.    MONITORING AND EVALUATION:   RD remains available upon request and will follow up per protocol.    Keila Cantu, MS,RDN,CDN,CNSC   Available on [facility] TEAMS

## 2025-03-02 NOTE — PROGRESS NOTE ADULT - SUBJECTIVE AND OBJECTIVE BOX
Transplant Surgery - Multidisciplinary rounds   --------------------------------------------------------------   Tx Date:  2/25/2025       POD#5    HPI: 70 y/o M PMHX DM (retinopathy,neuropathy), HTN, anemia, chronic CHF, TTE w/ grade II diastolic dysfunction mild/mod MR and mild TR (2023), HLD, and CKD ESRD on HD since Jan 2023 via LUE AVF that presents as a direct admit for DDRT. Patient's last HD session was 2/24/2025 prior to admission, states he makes slightly more than 1 cup of urine per day. Patient denies any acute complaints at this time, feels in his usual state of health.     Patient is now s/p DDRT 2a on patch/1v/1u + stent on 2/25/2025 under Thymoglobulin induction    Interval Events:      Immunosuppression  Induction: Thymo  Maintenance: FK/MMF/SST  Ongoing monitoring for signs of rejection     Potential Discharge date: TBD  Education:  Medications  Plan of care:  See Below        TX DATA HERE        Review of systems  Gen: No weight changes, fatigue, fevers/chills, weakness  Skin: No rashes  Head/Eyes/Ears/Mouth: No headache; Normal hearing; Normal vision w/o blurriness; No sinus pain/discomfort, sore throat  Respiratory: No dyspnea, cough, wheezing, hemoptysis  CV: No chest pain, PND, orthopnea  GI: Mild abdominal pain at surgical incision site; denies diarrhea, constipation, nausea, vomiting, melena, hematochezia  : No increased frequency, dysuria, hematuria, nocturia  MSK: No joint pain/swelling; no back pain; no edema  Neuro: No dizziness/lightheadedness, weakness, seizures, numbness, tingling  Heme: No easy bruising or bleeding  Endo: No heat/cold intolerance  Psych: No significant nervousness, anxiety, stress, depression  All other systems were reviewed and are negative, except as noted.      PHYSICAL EXAM:  Constitutional: Well developed / well nourished  Eyes: Anicteric, PERRLA  ENMT: nc/at  Neck: supple  Respiratory: CTA B/L  Cardiovascular: RRR  Gastrointestinal: Soft, non distended, mild tenderness at the incision site; incision c/d/i; JULES x2: JULES 1 serous, JULES 2 SS   Genitourinary: Urinary catheter in place, low UO   Extremities: SCD's in place and working bilaterally, AVF LUE  Vascular: Palpable dp pulses bilaterally  Neurological: A&O x3  Skin: no rashes, ulcerations or lesions;  Musculoskeletal: Moving all extremities  Psychiatric: Responsive     Transplant Surgery - Multidisciplinary rounds   --------------------------------------------------------------   Tx Date:  2/25/2025       POD#5    Present: Patient seen and examined with multidisciplinary Transplant team including Surgeon Dr Mccormack, Nephrologist Dr Adame, Surgical fellow, Nephrology fellow, TERRY Russ/Ro, Pharmacist, Nutritionist,  and bedside RN during AM rounds.   Disciplines not in attendance will be notified of the plan.     HPI: 68 y/o M PMHX DM (retinopathy,neuropathy), HTN, anemia, chronic CHF, TTE w/ grade II diastolic dysfunction mild/mod MR and mild TR (2023), HLD, and CKD ESRD on HD since Jan 2023 via LUE AVF that presents as a direct admit for DDRT. Patient's last HD session was 2/24/2025 prior to admission, states he makes slightly more than 1 cup of urine per day. Patient denies any acute complaints at this time, feels in his usual state of health.     Patient is now s/p DDRT 2a on patch/1v/1u + stent on 2/25/2025 under Thymoglobulin induction    Interval Events:      Immunosuppression  Induction: Thymo  Maintenance: FK/MMF/SST  Ongoing monitoring for signs of rejection     Potential Discharge date: TBD  Education:  Medications  Plan of care:  See Below        TX DATA HERE        Review of systems  Gen: No weight changes, fatigue, fevers/chills, weakness  Skin: No rashes  Head/Eyes/Ears/Mouth: No headache; Normal hearing; Normal vision w/o blurriness; No sinus pain/discomfort, sore throat  Respiratory: No dyspnea, cough, wheezing, hemoptysis  CV: No chest pain, PND, orthopnea  GI: Mild abdominal pain at surgical incision site; denies diarrhea, constipation, nausea, vomiting, melena, hematochezia  : No increased frequency, dysuria, hematuria, nocturia  MSK: No joint pain/swelling; no back pain; no edema  Neuro: No dizziness/lightheadedness, weakness, seizures, numbness, tingling  Heme: No easy bruising or bleeding  Endo: No heat/cold intolerance  Psych: No significant nervousness, anxiety, stress, depression  All other systems were reviewed and are negative, except as noted.      PHYSICAL EXAM:  Constitutional: Well developed / well nourished  Eyes: Anicteric, PERRLA  ENMT: nc/at  Neck: supple  Respiratory: CTA B/L  Cardiovascular: RRR  Gastrointestinal: Soft, non distended, mild tenderness at the incision site; incision c/d/i; JULES x2: JULES 1 serous, JULES 2 SS   Genitourinary: Urinary catheter in place, low UO   Extremities: SCD's in place and working bilaterally, AVF LUE  Vascular: Palpable dp pulses bilaterally  Neurological: A&O x3  Skin: no rashes, ulcerations or lesions;  Musculoskeletal: Moving all extremities  Psychiatric: Responsive     Transplant Surgery - Multidisciplinary rounds   --------------------------------------------------------------   Tx Date:  2/25/2025       POD#5    Present: Patient seen and examined with multidisciplinary Transplant team including Surgeon Dr Mccormack, Nephrologist Dr Adame, Surgical fellow, Nephrology fellow, KOSTA Russ/Ro, Pharmacist, Nutritionist,  and bedside RN during AM rounds.   Disciplines not in attendance will be notified of the plan.     HPI: 70 y/o M PMHX DM (retinopathy,neuropathy), HTN, anemia, chronic CHF, TTE w/ grade II diastolic dysfunction mild/mod MR and mild TR (2023), HLD, and CKD ESRD on HD since Jan 2023 via LUE AVF that presents as a direct admit for DDRT. Patient's last HD session was 2/24/2025 prior to admission, states he makes slightly more than 1 cup of urine per day. Patient denies any acute complaints at this time, feels in his usual state of health.     Patient is now s/p DDRT 2a on patch/1v/1u + stent on 2/25/2025 under Thymoglobulin induction    Interval Events:  - afebrile, HTN 180s- hydralazine x1 with good response   - rpt US renal showed fluid collection unchanged from prior  - d/c nystatin and switched to fluc d/t donor+ ucx for candida   - holding thymo for low WBC       Immunosuppression  Induction: Thymo  Maintenance: FK/MMF/SST  Ongoing monitoring for signs of rejection     Potential Discharge date: TBD  Education:  Medications  Plan of care:  See Below      MEDICATIONS  (STANDING):  antithymocyte globulin rabbit (peripheral) IVPB w/additives 75 milliGRAM(s) IV Intermittent once  chlorhexidine 2% Cloths 1 Application(s) Topical daily  dextrose 5%. 1000 milliLiter(s) (100 mL/Hr) IV Continuous <Continuous>  dextrose 5%. 1000 milliLiter(s) (50 mL/Hr) IV Continuous <Continuous>  dextrose 50% Injectable 25 Gram(s) IV Push once  dextrose 50% Injectable 12.5 Gram(s) IV Push once  dextrose 50% Injectable 25 Gram(s) IV Push once  famotidine    Tablet 20 milliGRAM(s) Oral daily  fluconAZOLE   Tablet 200 milliGRAM(s) Oral daily  glucagon  Injectable 1 milliGRAM(s) IntraMuscular once  heparin   Injectable 5000 Unit(s) SubCutaneous every 12 hours  insulin lispro (ADMELOG) corrective regimen sliding scale   SubCutaneous every 6 hours  mycophenolate mofetil 500 milliGRAM(s) Oral <User Schedule>  polyethylene glycol 3350 17 Gram(s) Oral daily  tacrolimus ER Tablet (ENVARSUS XR) 6 milliGRAM(s) Oral <User Schedule>  tamsulosin 0.4 milliGRAM(s) Oral at bedtime  valGANciclovir 450 milliGRAM(s) Oral <User Schedule>    MEDICATIONS  (PRN):  dextrose Oral Gel 15 Gram(s) Oral once PRN Blood Glucose LESS THAN 70 milliGRAM(s)/deciliter  HYDROmorphone  Injectable 0.5 milliGRAM(s) IV Push every 10 minutes PRN Moderate Pain (4 - 6)  traMADol 25 milliGRAM(s) Oral every 8 hours PRN Moderate Pain (4 - 6)  traMADol 50 milliGRAM(s) Oral every 8 hours PRN Severe Pain (7 - 10)      PAST MEDICAL & SURGICAL HISTORY:  HTN (hypertension)      Type 2 diabetes mellitus      ESRD on dialysis  mwf      Dyslipidemia      S/P arteriovenous (AV) fistula creation          Vital Signs Last 24 Hrs  T(C): 36.8 (02 Mar 2025 16:46), Max: 37.1 (02 Mar 2025 00:56)  T(F): 98.2 (02 Mar 2025 16:46), Max: 98.7 (02 Mar 2025 00:56)  HR: 83 (02 Mar 2025 16:46) (69 - 98)  BP: 162/80 (02 Mar 2025 16:46) (139/77 - 182/93)  BP(mean): 3 (02 Mar 2025 06:46) (3 - 3)  RR: 18 (02 Mar 2025 16:46) (18 - 18)  SpO2: 95% (02 Mar 2025 16:46) (95% - 97%)    Parameters below as of 02 Mar 2025 16:46  Patient On (Oxygen Delivery Method): room air        I&O's Summary    01 Mar 2025 07:01  -  02 Mar 2025 07:00  --------------------------------------------------------  IN: 660 mL / OUT: 2383 mL / NET: -1723 mL    02 Mar 2025 07:01  -  02 Mar 2025 16:51  --------------------------------------------------------  IN: 360 mL / OUT: 170 mL / NET: 190 mL                              9.3    1.99  )-----------( 88       ( 02 Mar 2025 06:48 )             28.7     03-02    140  |  99  |  65[H]  ----------------------------<  132[H]  4.9   |  22  |  9.56[H]    Ca    8.8      02 Mar 2025 06:46  Phos  6.1     03-02  Mg     2.4     03-02    TPro  6.6  /  Alb  3.3  /  TBili  0.3  /  DBili  x   /  AST  9[L]  /  ALT  <5[L]  /  AlkPhos  76  03-02    Tacrolimus (), Serum: 3.6 ng/mL (03-02 @ 06:58)    Review of systems  Gen: No weight changes, fatigue, fevers/chills, weakness  Skin: No rashes  Head/Eyes/Ears/Mouth: No headache; Normal hearing; Normal vision w/o blurriness; No sinus pain/discomfort, sore throat  Respiratory: No dyspnea, cough, wheezing, hemoptysis  CV: No chest pain, PND, orthopnea  GI: Mild abdominal pain at surgical incision site; denies diarrhea, constipation, nausea, vomiting, melena, hematochezia  : No increased frequency, dysuria, hematuria, nocturia  MSK: No joint pain/swelling; no back pain; no edema  Neuro: No dizziness/lightheadedness, weakness, seizures, numbness, tingling  Heme: No easy bruising or bleeding  Endo: No heat/cold intolerance  Psych: No significant nervousness, anxiety, stress, depression  All other systems were reviewed and are negative, except as noted.      PHYSICAL EXAM:  Constitutional: Well developed / well nourished  Eyes: Anicteric, PERRLA  ENMT: nc/at  Neck: supple  Respiratory: CTA B/L  Cardiovascular: RRR  Gastrointestinal: Soft, non distended, mild tenderness at the incision site; incision c/d/i; JULES x2: JULES 1 serous, JULES 2 SS   Genitourinary: Urinary catheter in place, low UO   Extremities: SCD's in place and working bilaterally, AVF LUE  Vascular: Palpable dp pulses bilaterally  Neurological: A&O x3  Skin: no rashes, ulcerations or lesions;  Musculoskeletal: Moving all extremities  Psychiatric: Responsive

## 2025-03-02 NOTE — PROGRESS NOTE ADULT - SUBJECTIVE AND OBJECTIVE BOX
DDRT, post transplant, Thymo induction, DGF, dialyzed on 3/1/25    S: No new /acute symptoms    O:    MEDICATIONS  (STANDING):  acetaminophen     Tablet .. 650 milliGRAM(s) Oral once  antithymocyte globulin rabbit (peripheral) IVPB w/additives 75 milliGRAM(s) IV Intermittent once  chlorhexidine 2% Cloths 1 Application(s) Topical daily  dextrose 5%. 1000 milliLiter(s) (50 mL/Hr) IV Continuous <Continuous>  dextrose 5%. 1000 milliLiter(s) (100 mL/Hr) IV Continuous <Continuous>  dextrose 50% Injectable 25 Gram(s) IV Push once  dextrose 50% Injectable 12.5 Gram(s) IV Push once  dextrose 50% Injectable 25 Gram(s) IV Push once  diphenhydrAMINE 50 milliGRAM(s) Oral once  famotidine    Tablet 20 milliGRAM(s) Oral daily  fluconAZOLE   Tablet 200 milliGRAM(s) Oral daily  glucagon  Injectable 1 milliGRAM(s) IntraMuscular once  heparin   Injectable 5000 Unit(s) SubCutaneous every 12 hours  insulin lispro (ADMELOG) corrective regimen sliding scale   SubCutaneous every 6 hours  methylPREDNISolone sodium succinate Injectable 40 milliGRAM(s) IV Push once  mycophenolate mofetil 500 milliGRAM(s) Oral <User Schedule>  polyethylene glycol 3350 17 Gram(s) Oral daily  tacrolimus ER Tablet (ENVARSUS XR) 6 milliGRAM(s) Oral <User Schedule>  tamsulosin 0.4 milliGRAM(s) Oral at bedtime  valGANciclovir 450 milliGRAM(s) Oral <User Schedule>    MEDICATIONS  (PRN):  dextrose Oral Gel 15 Gram(s) Oral once PRN Blood Glucose LESS THAN 70 milliGRAM(s)/deciliter  HYDROmorphone  Injectable 0.5 milliGRAM(s) IV Push every 10 minutes PRN Moderate Pain (4 - 6)  traMADol 25 milliGRAM(s) Oral every 8 hours PRN Moderate Pain (4 - 6)  traMADol 50 milliGRAM(s) Oral every 8 hours PRN Severe Pain (7 - 10)    Vital Signs Last 24 Hrs  T(C): 36.3 (03-02-25 @ 09:00)  T(F): 97.4 (03-02-25 @ 09:00), Max: 98.7 (03-02-25 @ 00:56)  HR: 82 (03-02-25 @ 09:00) (71 - 98)  BP: 144/69 (03-02-25 @ 09:00)  BP(mean): 3 (03-02-25 @ 06:46) (3 - 119)  RR: 18 (03-02-25 @ 09:00) (18 - 18)  SpO2: 95% (03-02-25 @ 09:00) (95% - 97%)  Wt(kg): --    03-01 @ 07:01  -  03-02 @ 07:00  --------------------------------------------------------  IN: 660 mL / OUT: 2383 mL / NET: -1723 mL    03-02 @ 07:01  -  03-02 @ 13:32  --------------------------------------------------------  IN: 120 mL / OUT: 75 mL / NET: 45 mL      On Exam;  No distress, not dyspnoeic  HENT: No acute signs  Neck: no JVD  Chest/CV: no acute signs  Abd: healing incision, drain noted  Last catheter noted this AM  Ext; no acute signs  Neuro: no acute/ focal signs        LABS/STUDIES  --------------------------------------------------------------------------------              9.3    1.99  >-----------<  88       [03-02-25 @ 06:48]              28.7     140  |  99  |  65  ----------------------------<  132      [03-02-25 @ 06:46]  4.9   |  22  |  9.56        Ca     8.8     [03-02-25 @ 06:46]      Mg     2.4     [03-02-25 @ 06:46]      Phos  6.1     [03-02-25 @ 06:46]    TPro  6.6  /  Alb  3.3  /  TBili  0.3  /  DBili  x   /  AST  9   /  ALT  <5  /  AlkPhos  76  [03-02-25 @ 06:46]          Creatinine Trend:  SCr 9.56 [03-02 @ 06:46]  SCr 12.23 [03-01 @ 07:24]  SCr 9.87 [02-28 @ 06:59]  SCr 13.07 [02-27 @ 12:48]  SCr 12.94 [02-27 @ 07:21]    Tacrolimus (), Serum: 3.6 ng/mL (03-02 @ 06:58)  Tacrolimus (), Serum: 5.9 ng/mL (03-01 @ 07:26)  Tacrolimus (), Serum: 3.4 ng/mL (02-28 @ 06:59)  Tacrolimus (), Serum: 4.2 ng/mL (02-27 @ 07:24)      < from: US Trans Kidney w/ Doppler, Right (03.01.25 @ 15:54) >    Renal Transplant: 11.1 cm. No renal mass, hydronephrosis or calculi.   There is a small collection of fluid along the inferior margin of the   transplant, measuring 1.5 x 1.4 x 1.2 cm, previously 1.8 x 1.6 x 1.3 cm.   No other fluid collections are identified. A perinephric drain is noted.  Urinary bladder: Collapsed around Last catheter    Color and spectral Doppler reveals homogeneous flow throughout the   transplant.    Peak iliac artery velocity is 156.5 cm/sec pre-anastomosis, 93.1 cm/sec   at the anastomosis, and 119.5 cm/sec post anastomosis.    Transplant Renal Artery: As per operative note, there are 2 renal   arteries anastomosed to a single patch.    Superior renal artery:  Peak systolic velocity is:  86.7 cm/s anastomosis,  75.8 cm/sec proximal,   41.3 cm/sec mid, 52.3 cm/sec distal and 49.0 cm/sec hilum.    Inferior renal artery:  Peak systolic velocity is: 43.6 cm/s anastomosis, 54.6 cm/sec proximal,   68.9 cm/sec mid, 43.5 cm/sec distal and 28.7 cm/sec hilum.    Resistive Indices Range: 0.62 to 0.76    Transplant Renal Vein: Patent.    IMPRESSION:    1. Status post renal transplant in the right lower quadrant. The   transplant vasculature is patent, as detailed above. Overall, the peak   velocities within the transplant renal arteries are decreased compared to   the prior exams, of uncertain significance. Continued follow-up is   recommended.  2. There is a small fluid collection adjacent to the lower pole of the   transplant which appears grossly unchanged compared to the prior exam. No   other perinephric fluid collections are identified.      < end of copied text >

## 2025-03-03 LAB
ALBUMIN SERPL ELPH-MCNC: 3.2 G/DL — LOW (ref 3.3–5)
ALP SERPL-CCNC: 72 U/L — SIGNIFICANT CHANGE UP (ref 40–120)
ALT FLD-CCNC: <5 U/L — LOW (ref 10–45)
ANION GAP SERPL CALC-SCNC: 20 MMOL/L — HIGH (ref 5–17)
AST SERPL-CCNC: 12 U/L — SIGNIFICANT CHANGE UP (ref 10–40)
BILIRUB SERPL-MCNC: 0.4 MG/DL — SIGNIFICANT CHANGE UP (ref 0.2–1.2)
BUN SERPL-MCNC: 88 MG/DL — HIGH (ref 7–23)
CALCIUM SERPL-MCNC: 8.7 MG/DL — SIGNIFICANT CHANGE UP (ref 8.4–10.5)
CHLORIDE SERPL-SCNC: 98 MMOL/L — SIGNIFICANT CHANGE UP (ref 96–108)
CO2 SERPL-SCNC: 19 MMOL/L — LOW (ref 22–31)
CREAT SERPL-MCNC: 11.43 MG/DL — HIGH (ref 0.5–1.3)
EGFR: 4 ML/MIN/1.73M2 — LOW
EGFR: 4 ML/MIN/1.73M2 — LOW
GLUCOSE BLDC GLUCOMTR-MCNC: 143 MG/DL — HIGH (ref 70–99)
GLUCOSE BLDC GLUCOMTR-MCNC: 146 MG/DL — HIGH (ref 70–99)
GLUCOSE BLDC GLUCOMTR-MCNC: 158 MG/DL — HIGH (ref 70–99)
GLUCOSE BLDC GLUCOMTR-MCNC: 187 MG/DL — HIGH (ref 70–99)
GLUCOSE BLDC GLUCOMTR-MCNC: 212 MG/DL — HIGH (ref 70–99)
GLUCOSE BLDC GLUCOMTR-MCNC: 225 MG/DL — HIGH (ref 70–99)
GLUCOSE SERPL-MCNC: 168 MG/DL — HIGH (ref 70–99)
HCT VFR BLD CALC: 30.1 % — LOW (ref 39–50)
HGB BLD-MCNC: 9.1 G/DL — LOW (ref 13–17)
MAGNESIUM SERPL-MCNC: 2.4 MG/DL — SIGNIFICANT CHANGE UP (ref 1.6–2.6)
MCHC RBC-ENTMCNC: 30.2 G/DL — LOW (ref 32–36)
MCHC RBC-ENTMCNC: 30.2 PG — SIGNIFICANT CHANGE UP (ref 27–34)
MCV RBC AUTO: 100 FL — SIGNIFICANT CHANGE UP (ref 80–100)
NRBC BLD AUTO-RTO: 0 /100 WBCS — SIGNIFICANT CHANGE UP (ref 0–0)
PHOSPHATE SERPL-MCNC: 7.4 MG/DL — HIGH (ref 2.5–4.5)
PLATELET # BLD AUTO: 94 K/UL — LOW (ref 150–400)
POTASSIUM SERPL-MCNC: 5 MMOL/L — SIGNIFICANT CHANGE UP (ref 3.5–5.3)
POTASSIUM SERPL-SCNC: 5 MMOL/L — SIGNIFICANT CHANGE UP (ref 3.5–5.3)
PROT SERPL-MCNC: 6.6 G/DL — SIGNIFICANT CHANGE UP (ref 6–8.3)
RBC # BLD: 3.01 M/UL — LOW (ref 4.2–5.8)
RBC # FLD: 13.4 % — SIGNIFICANT CHANGE UP (ref 10.3–14.5)
SODIUM SERPL-SCNC: 137 MMOL/L — SIGNIFICANT CHANGE UP (ref 135–145)
TACROLIMUS SERPL-MCNC: 3.2 NG/ML — SIGNIFICANT CHANGE UP
WBC # BLD: 2.47 K/UL — LOW (ref 3.8–10.5)
WBC # FLD AUTO: 2.47 K/UL — LOW (ref 3.8–10.5)

## 2025-03-03 PROCEDURE — 99232 SBSQ HOSP IP/OBS MODERATE 35: CPT | Mod: GC

## 2025-03-03 RX ORDER — GABAPENTIN 400 MG/1
300 CAPSULE ORAL AT BEDTIME
Refills: 0 | Status: DISCONTINUED | OUTPATIENT
Start: 2025-03-03 | End: 2025-03-04

## 2025-03-03 RX ORDER — TAMSULOSIN HYDROCHLORIDE 0.4 MG/1
1 CAPSULE ORAL
Qty: 1 | Refills: 0
Start: 2025-03-03 | End: 2025-04-01

## 2025-03-03 RX ORDER — TAMSULOSIN HYDROCHLORIDE 0.4 MG/1
1 CAPSULE ORAL
Qty: 30 | Refills: 0
Start: 2025-03-03 | End: 2025-04-01

## 2025-03-03 RX ORDER — FLUCONAZOLE 150 MG
1 TABLET ORAL
Qty: 14 | Refills: 0
Start: 2025-03-03 | End: 2025-03-16

## 2025-03-03 RX ORDER — TACROLIMUS 0.5 MG/1
2 CAPSULE ORAL ONCE
Refills: 0 | Status: COMPLETED | OUTPATIENT
Start: 2025-03-03 | End: 2025-03-03

## 2025-03-03 RX ORDER — TACROLIMUS 0.5 MG/1
8 CAPSULE ORAL
Refills: 0 | Status: DISCONTINUED | OUTPATIENT
Start: 2025-03-04 | End: 2025-03-04

## 2025-03-03 RX ORDER — PREDNISONE 20 MG/1
5 TABLET ORAL
Refills: 0 | Status: COMPLETED | OUTPATIENT
Start: 2025-03-03 | End: 2025-03-03

## 2025-03-03 RX ORDER — PREDNISONE 20 MG/1
TABLET ORAL
Refills: 0 | Status: DISCONTINUED | OUTPATIENT
Start: 2025-03-03 | End: 2025-03-04

## 2025-03-03 RX ORDER — CARVEDILOL 3.12 MG/1
1 TABLET, FILM COATED ORAL
Qty: 60 | Refills: 0
Start: 2025-03-03 | End: 2025-04-01

## 2025-03-03 RX ORDER — SULFAMETHOXAZOLE/TRIMETHOPRIM 800-160 MG
1 TABLET ORAL
Refills: 0 | Status: DISCONTINUED | OUTPATIENT
Start: 2025-03-03 | End: 2025-03-04

## 2025-03-03 RX ORDER — INSULIN LISPRO 100 U/ML
INJECTION, SOLUTION INTRAVENOUS; SUBCUTANEOUS
Refills: 0 | Status: DISCONTINUED | OUTPATIENT
Start: 2025-03-03 | End: 2025-03-04

## 2025-03-03 RX ORDER — PREDNISONE 20 MG/1
5 TABLET ORAL DAILY
Refills: 0 | Status: DISCONTINUED | OUTPATIENT
Start: 2025-03-04 | End: 2025-03-04

## 2025-03-03 RX ORDER — FLUCONAZOLE 150 MG
1 TABLET ORAL
Qty: 11 | Refills: 0
Start: 2025-03-03 | End: 2025-03-13

## 2025-03-03 RX ORDER — CARVEDILOL 3.12 MG/1
6.25 TABLET, FILM COATED ORAL EVERY 12 HOURS
Refills: 0 | Status: DISCONTINUED | OUTPATIENT
Start: 2025-03-03 | End: 2025-03-04

## 2025-03-03 RX ADMIN — MYCOPHENOLATE MOFETIL 500 MILLIGRAM(S): 500 TABLET, FILM COATED ORAL at 22:18

## 2025-03-03 RX ADMIN — Medication 20 MILLIGRAM(S): at 11:37

## 2025-03-03 RX ADMIN — INSULIN LISPRO 4: 100 INJECTION, SOLUTION INTRAVENOUS; SUBCUTANEOUS at 00:38

## 2025-03-03 RX ADMIN — CARVEDILOL 6.25 MILLIGRAM(S): 3.12 TABLET, FILM COATED ORAL at 22:18

## 2025-03-03 RX ADMIN — MYCOPHENOLATE MOFETIL 500 MILLIGRAM(S): 500 TABLET, FILM COATED ORAL at 07:42

## 2025-03-03 RX ADMIN — TACROLIMUS 6 MILLIGRAM(S): 0.5 CAPSULE ORAL at 07:43

## 2025-03-03 RX ADMIN — PREDNISONE 5 MILLIGRAM(S): 20 TABLET ORAL at 12:20

## 2025-03-03 RX ADMIN — INSULIN LISPRO 4: 100 INJECTION, SOLUTION INTRAVENOUS; SUBCUTANEOUS at 12:49

## 2025-03-03 RX ADMIN — INSULIN LISPRO 2: 100 INJECTION, SOLUTION INTRAVENOUS; SUBCUTANEOUS at 22:19

## 2025-03-03 RX ADMIN — TACROLIMUS 2 MILLIGRAM(S): 0.5 CAPSULE ORAL at 13:16

## 2025-03-03 RX ADMIN — TAMSULOSIN HYDROCHLORIDE 0.4 MILLIGRAM(S): 0.4 CAPSULE ORAL at 22:18

## 2025-03-03 RX ADMIN — Medication 1 APPLICATION(S): at 11:36

## 2025-03-03 RX ADMIN — VALGANCICLOVIR 450 MILLIGRAM(S): 450 TABLET, FILM COATED ORAL at 07:42

## 2025-03-03 RX ADMIN — PREDNISONE 5 MILLIGRAM(S): 20 TABLET ORAL at 17:25

## 2025-03-03 RX ADMIN — Medication 200 MILLIGRAM(S): at 11:37

## 2025-03-03 RX ADMIN — HEPARIN SODIUM 5000 UNIT(S): 1000 INJECTION INTRAVENOUS; SUBCUTANEOUS at 17:25

## 2025-03-03 RX ADMIN — INSULIN LISPRO 2: 100 INJECTION, SOLUTION INTRAVENOUS; SUBCUTANEOUS at 05:59

## 2025-03-03 RX ADMIN — HEPARIN SODIUM 5000 UNIT(S): 1000 INJECTION INTRAVENOUS; SUBCUTANEOUS at 06:01

## 2025-03-03 RX ADMIN — Medication 1 TABLET(S): at 09:52

## 2025-03-03 NOTE — PROGRESS NOTE ADULT - ASSESSMENT
70 y/o M PMHX DM (retinopathy,neuropathy), HTN, anemia, chronic CHF, TTE w/ grade II diastolic dysfunction mild/mod MR and mild TR (2023), HLD, and CKD ESRD on HD since Jan 2023 via LUE AVF that presents as a direct admit for DDRT on 2/26. Transplant nephrology consulted for management of DDRT.     1. S/p DDRT:   Patient ESRD on Hemodialysis since Jan 2023 likely underlying kidney ds from long standing DKD. Patient's last HD session was 2/24/2025 prior to admission.  Patient underwent DDRt on 2/25/2025.   Right Kidney transplanted on the RLQ under steroid and Thymo induction;   ureteral stent x 1 placed; 1V / 2A anastomosed to single patch/ 1 U (stented)  CIT 36 hours 30 min  Scr elevated at 10.79, BUN 52 on 2/26. Electrolytes fair. Euvolemic on RA no pedal edema.  Repeat Transplant US: renal transplant with patent vasculature and homogeneous flow. No evidence of a significant renal artery stenosis. Interval decrease in size of lower pole collection.  Last HD on 3/1/25. Plan for HD again today for DGF  Monitor labs and UOP. Avoid any potential nephrotoxins (NSAIDs/ACEi/ARBs) when possible. Dose medications as per eGFR/HD.     2. Immunosuppression   - Thymo induction  -Envarsus per level   -MMF 1g PO BID  - SST  - PPx     3. DM: Insulin Admelog ISS

## 2025-03-03 NOTE — PROGRESS NOTE ADULT - SUBJECTIVE AND OBJECTIVE BOX
Transplant Surgery - Multidisciplinary rounds   --------------------------------------------------------------   Tx Date:  2/25/2025       POD#6    Present: Patient seen and examined with multidisciplinary Transplant team including Surgeon Dr Mata Nephrologist Dr Waters, Surgical fellow, Nephrology fellow, KOSTA Joshua/Ro, Pharmacist, Nutritionist,  and bedside RN during AM rounds.   Disciplines not in attendance will be notified of the plan.     HPI: 68 y/o M PMHX DM (retinopathy,neuropathy), HTN, anemia, chronic CHF, TTE w/ grade II diastolic dysfunction mild/mod MR and mild TR (2023), HLD, and CKD ESRD on HD since Jan 2023 via LUE AVF that presents as a direct admit for DDRT. Patient's last HD session was 2/24/2025 prior to admission, states he makes slightly more than 1 cup of urine per day. Patient denies any acute complaints at this time, feels in his usual state of health.     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 1000 milliLiter(s) (50 mL/Hr) IV Continuous <Continuous>  dextrose 5%. 1000 milliLiter(s) (100 mL/Hr) IV Continuous <Continuous>  dextrose 50% Injectable 25 Gram(s) IV Push once  dextrose 50% Injectable 12.5 Gram(s) IV Push once  dextrose 50% Injectable 25 Gram(s) IV Push once  famotidine    Tablet 20 milliGRAM(s) Oral daily  fluconAZOLE   Tablet 200 milliGRAM(s) Oral daily  glucagon  Injectable 1 milliGRAM(s) IntraMuscular once  heparin   Injectable 5000 Unit(s) SubCutaneous every 12 hours  insulin lispro (ADMELOG) corrective regimen sliding scale   SubCutaneous Before meals and at bedtime  mycophenolate mofetil 500 milliGRAM(s) Oral <User Schedule>  polyethylene glycol 3350 17 Gram(s) Oral daily  predniSONE   Tablet   Oral   predniSONE   Tablet 5 milliGRAM(s) Oral two times a day  tamsulosin 0.4 milliGRAM(s) Oral at bedtime  trimethoprim   80 mG/sulfamethoxazole 400 mG 1 Tablet(s) Oral <User Schedule>  valGANciclovir 450 milliGRAM(s) Oral <User Schedule>    MEDICATIONS  (PRN):  dextrose Oral Gel 15 Gram(s) Oral once PRN Blood Glucose LESS THAN 70 milliGRAM(s)/deciliter  gabapentin 300 milliGRAM(s) Oral at bedtime PRN neuropathy  HYDROmorphone  Injectable 0.5 milliGRAM(s) IV Push every 10 minutes PRN Moderate Pain (4 - 6)  traMADol 25 milliGRAM(s) Oral every 8 hours PRN Moderate Pain (4 - 6)  traMADol 50 milliGRAM(s) Oral every 8 hours PRN Severe Pain (7 - 10)      PAST MEDICAL & SURGICAL HISTORY:  HTN (hypertension)      Type 2 diabetes mellitus      ESRD on dialysis  mwf      Dyslipidemia      S/P arteriovenous (AV) fistula creation          Vital Signs Last 24 Hrs  T(C): 36.8 (03 Mar 2025 13:30), Max: 36.9 (03 Mar 2025 06:02)  T(F): 98.2 (03 Mar 2025 13:30), Max: 98.5 (03 Mar 2025 06:02)  HR: 81 (03 Mar 2025 13:30) (68 - 83)  BP: 151/87 (03 Mar 2025 13:30) (151/87 - 170/80)  BP(mean): 119 (03 Mar 2025 06:02) (112 - 119)  RR: 18 (03 Mar 2025 13:30) (16 - 18)  SpO2: 100% (03 Mar 2025 13:30) (94% - 100%)    Parameters below as of 03 Mar 2025 09:27  Patient On (Oxygen Delivery Method): room air        I&O's Summary    02 Mar 2025 07:01  -  03 Mar 2025 07:00  --------------------------------------------------------  IN: 500 mL / OUT: 355 mL / NET: 145 mL    03 Mar 2025 07:01  -  03 Mar 2025 15:12  --------------------------------------------------------  IN: 600 mL / OUT: 570 mL / NET: 30 mL                              9.1    2.47  )-----------( 94       ( 03 Mar 2025 06:52 )             30.1     03-03    137  |  98  |  88[H]  ----------------------------<  168[H]  5.0   |  19[L]  |  11.43[H]    Ca    8.7      03 Mar 2025 06:44  Phos  7.4     03-03  Mg     2.4     03-03    TPro  6.6  /  Alb  3.2[L]  /  TBili  0.4  /  DBili  x   /  AST  12  /  ALT  <5[L]  /  AlkPhos  72  03-03    Tacrolimus (), Serum: 3.2 ng/mL (03-03 @ 06:44)                  Review of systems  All other systems were reviewed and are negative, except as noted.      PHYSICAL EXAM:  Constitutional: Well developed / well nourished  Eyes: Anicteric, PERRLA  ENMT: nc/at  Neck: supple  Respiratory: CTA B/L  Cardiovascular: RRR  Gastrointestinal: Soft, non distended, mild tenderness at the incision site; incision c/d/i; JULES x2: JULES 1 serous, JULES 2 SS   Genitourinary: Urinary catheter in place, low UO   Extremities: SCD's in place and working bilaterally, AVF LUE  Vascular: Palpable dp pulses bilaterally  Neurological: A&O x3  Skin: no rashes, ulcerations or lesions;  Musculoskeletal: Moving all extremities  Psychiatric: Responsive

## 2025-03-03 NOTE — PROGRESS NOTE ADULT - SUBJECTIVE AND OBJECTIVE BOX
[facility] DIVISION OF KIDNEY DISEASES AND HYPERTENSION   FOLLOW UP NOTE  -------------------------------------------------------------------------------  Chief Complaint: DDRT 2/26/25 with DGF requiring HD    24 hour events/subjective: Pt. was seen and examined. Pt is feeling "fine". Pt notes some improvement in UOP but still only a small amount. Plan for HD today. Pt denied n/v/f/c/sob.    PAST HISTORY  --------------------------------------------------------------------------------  No significant changes to PMH, PSH, FHx, SHx, unless otherwise noted    ALLERGIES & MEDICATIONS  --------------------------------------------------------------------------------  Allergies    No Known Allergies    Intolerances      Standing Inpatient Medications  carvedilol 6.25 milliGRAM(s) Oral every 12 hours  chlorhexidine 2% Cloths 1 Application(s) Topical daily  dextrose 5%. 1000 milliLiter(s) IV Continuous <Continuous>  dextrose 5%. 1000 milliLiter(s) IV Continuous <Continuous>  dextrose 50% Injectable 25 Gram(s) IV Push once  dextrose 50% Injectable 12.5 Gram(s) IV Push once  dextrose 50% Injectable 25 Gram(s) IV Push once  famotidine    Tablet 20 milliGRAM(s) Oral daily  fluconAZOLE   Tablet 200 milliGRAM(s) Oral daily  glucagon  Injectable 1 milliGRAM(s) IntraMuscular once  heparin   Injectable 5000 Unit(s) SubCutaneous every 12 hours  insulin lispro (ADMELOG) corrective regimen sliding scale   SubCutaneous Before meals and at bedtime  mycophenolate mofetil 500 milliGRAM(s) Oral <User Schedule>  polyethylene glycol 3350 17 Gram(s) Oral daily  predniSONE   Tablet   Oral   tamsulosin 0.4 milliGRAM(s) Oral at bedtime  trimethoprim   80 mG/sulfamethoxazole 400 mG 1 Tablet(s) Oral <User Schedule>  valGANciclovir 450 milliGRAM(s) Oral <User Schedule>    PRN Inpatient Medications  dextrose Oral Gel 15 Gram(s) Oral once PRN  gabapentin 300 milliGRAM(s) Oral at bedtime PRN  HYDROmorphone  Injectable 0.5 milliGRAM(s) IV Push every 10 minutes PRN  traMADol 25 milliGRAM(s) Oral every 8 hours PRN  traMADol 50 milliGRAM(s) Oral every 8 hours PRN    REVIEW OF SYSTEMS  --------------------------------------------------------------------------------  All other systems were reviewed and are negative, except as noted.    VITALS/PHYSICAL EXAM  --------------------------------------------------------------------------------  T(C): 36.4 (03-03-25 @ 17:05), Max: 36.9 (03-03-25 @ 06:02)  HR: 81 (03-03-25 @ 17:05) (68 - 81)  BP: 147/80 (03-03-25 @ 17:05) (147/80 - 170/80)  RR: 18 (03-03-25 @ 17:05) (16 - 18)  SpO2: 98% (03-03-25 @ 17:05) (94% - 100%)  Wt(kg): --    Weight (kg): 89.9 (03-02-25 @ 05:42)    03-02-25 @ 07:01  -  03-03-25 @ 07:00  --------------------------------------------------------  IN: 500 mL / OUT: 355 mL / NET: 145 mL    03-03-25 @ 07:01  -  03-03-25 @ 19:22  --------------------------------------------------------  IN: 840 mL / OUT: 710 mL / NET: 130 mL    Physical Exam:    LABS/STUDIES  --------------------------------------------------------------------------------              9.1    2.47  >-----------<  94       [03-03-25 @ 06:52]              30.1     137  |  98  |  88  ----------------------------<  168      [03-03-25 @ 06:44]  5.0   |  19  |  11.43        Ca     8.7     [03-03-25 @ 06:44]      Mg     2.4     [03-03-25 @ 06:44]      Phos  7.4     [03-03-25 @ 06:44]    TPro  6.6  /  Alb  3.2  /  TBili  0.4  /  DBili  x   /  AST  12  /  ALT  <5  /  AlkPhos  72  [03-03-25 @ 06:44]    Creatinine Trend:  SCr 11.43 [03-03 @ 06:44]  SCr 9.56 [03-02 @ 06:46]  SCr 12.23 [03-01 @ 07:24]  SCr 9.87 [02-28 @ 06:59]  SCr 13.07 [02-27 @ 12:48]    HBsAb 11.3      [02-25-25 @ 00:35]  HBsAg Nonreact      [02-25-25 @ 00:35]  HBcAb Reactive      [02-25-25 @ 00:35]  HCV 0.05, Nonreact      [02-25-25 @ 00:35]  HIV Nonreact      [02-25-25 @ 00:34]    Tacrolimus (), Serum: 3.2 ng/mL (03-03 @ 06:44)  Tacrolimus (), Serum: 3.6 ng/mL (03-02 @ 06:58)  Tacrolimus (), Serum: 5.9 ng/mL (03-01 @ 07:26)  Tacrolimus (), Serum: 3.4 ng/mL (02-28 @ 06:59)    Cyclosporine  Sirolimus  Mycophenolate  BK PCR  CMV PCR  Parvo PCR  EBV PCR

## 2025-03-03 NOTE — PROGRESS NOTE ADULT - ASSESSMENT
68 y/o M PMHX DM (retinopathy,neuropathy), HTN, anemia, chronic CHF, TTE w/ grade II diastolic dysfunction mild/mod MR and mild TR (2023), HLD, and CKD ESRD on HD since Jan 2023 via LUE AVF that presents as a direct admit for DDRT. Patient's last HD session was 2/24/2025 prior to admission, states he makes slightly more than 1 cup of urine per day. Patient denies any acute complaints at this time, feels in his usual state of health.   Patient is now s/p DDRT 2a on patch/1v/1u + stent on 2/25/2025 under Thymoglobulin induction    [] POD#5 DDRT  -DGF: HD 2/27, 3/1, 3/3   - strict I's & O's: carlo mcfadden'ed, passed TOV   - Renal Doppler: patent, small collection 4x2 cm  - SCDs/PT/IS  - SQH   - RLQ swelling near txp site, 3/1 rpt US kidney showed no change in fluid collection from prior   - lasix PRN   - flomax 0.4qhs    []immuno   -Envarsus by level,  BID, SST   -ppx: nystatin, valcyte MWF, bactrim MWF       [] HTN  - coreg 6.25 BID     [] DM  -ISS  -Podiatry consult (pt to miss scheduled appt tomorrow as outpt)   68 y/o M PMHX DM (retinopathy,neuropathy), HTN, anemia, chronic CHF, TTE w/ grade II diastolic dysfunction mild/mod MR and mild TR (2023), HLD, and CKD ESRD on HD since Jan 2023 via LUE AVF that presents as a direct admit for DDRT. Patient's last HD session was 2/24/2025 prior to admission, states he makes slightly more than 1 cup of urine per day. Patient denies any acute complaints at this time, feels in his usual state of health.   Patient is now s/p DDRT 2a on patch/1v/1u + stent on 2/25/2025 under Thymoglobulin induction    [] POD#6 DDRT  -DGF: HD 2/27, 3/1, 3/3   - strict I's & O's: carlo mcfadden'ed, passed TOV   - Renal Doppler: patent, small collection 4x2 cm  - SCDs/PT/IS  - SQH   - RLQ swelling near txp site, 3/1 rpt US kidney showed no change in fluid collection from prior   - lasix PRN   - flomax 0.4qhs  - donor +ucx candida, on fluc     []immuno   -Envarsus by level,  BID, SST   -ppx: fluc, valcyte MWF, bactrim MWF       [] HTN  - coreg 6.25 BID     [] DM  -ISS  -Podiatry consult (pt to miss scheduled appt tomorrow as outpt)

## 2025-03-04 ENCOUNTER — TRANSCRIPTION ENCOUNTER (OUTPATIENT)
Age: 70
End: 2025-03-04

## 2025-03-04 VITALS
OXYGEN SATURATION: 98 % | TEMPERATURE: 98 F | DIASTOLIC BLOOD PRESSURE: 75 MMHG | SYSTOLIC BLOOD PRESSURE: 162 MMHG | RESPIRATION RATE: 18 BRPM | HEART RATE: 64 BPM

## 2025-03-04 LAB
ALBUMIN SERPL ELPH-MCNC: 3.5 G/DL — SIGNIFICANT CHANGE UP (ref 3.3–5)
ALP SERPL-CCNC: 77 U/L — SIGNIFICANT CHANGE UP (ref 40–120)
ALT FLD-CCNC: <5 U/L — LOW (ref 10–45)
ANION GAP SERPL CALC-SCNC: 14 MMOL/L — SIGNIFICANT CHANGE UP (ref 5–17)
APPEARANCE UR: ABNORMAL
AST SERPL-CCNC: 14 U/L — SIGNIFICANT CHANGE UP (ref 10–40)
BACTERIA # UR AUTO: NEGATIVE /HPF — SIGNIFICANT CHANGE UP
BILIRUB SERPL-MCNC: 0.3 MG/DL — SIGNIFICANT CHANGE UP (ref 0.2–1.2)
BILIRUB UR-MCNC: NEGATIVE — SIGNIFICANT CHANGE UP
BUN SERPL-MCNC: 59 MG/DL — HIGH (ref 7–23)
CALCIUM SERPL-MCNC: 8.4 MG/DL — SIGNIFICANT CHANGE UP (ref 8.4–10.5)
CAST: 0 /LPF — SIGNIFICANT CHANGE UP (ref 0–4)
CHLORIDE SERPL-SCNC: 97 MMOL/L — SIGNIFICANT CHANGE UP (ref 96–108)
CO2 SERPL-SCNC: 28 MMOL/L — SIGNIFICANT CHANGE UP (ref 22–31)
COLOR SPEC: ABNORMAL
CREAT SERPL-MCNC: 8.71 MG/DL — HIGH (ref 0.5–1.3)
DIFF PNL FLD: ABNORMAL
EGFR: 6 ML/MIN/1.73M2 — LOW
EGFR: 6 ML/MIN/1.73M2 — LOW
GLUCOSE BLDC GLUCOMTR-MCNC: 123 MG/DL — HIGH (ref 70–99)
GLUCOSE BLDC GLUCOMTR-MCNC: 145 MG/DL — HIGH (ref 70–99)
GLUCOSE BLDC GLUCOMTR-MCNC: 229 MG/DL — HIGH (ref 70–99)
GLUCOSE SERPL-MCNC: 159 MG/DL — HIGH (ref 70–99)
GLUCOSE UR QL: 100 MG/DL
HCT VFR BLD CALC: 28.7 % — LOW (ref 39–50)
HGB BLD-MCNC: 9.4 G/DL — LOW (ref 13–17)
KETONES UR-MCNC: NEGATIVE MG/DL — SIGNIFICANT CHANGE UP
LEUKOCYTE ESTERASE UR-ACNC: ABNORMAL
MAGNESIUM SERPL-MCNC: 2.3 MG/DL — SIGNIFICANT CHANGE UP (ref 1.6–2.6)
MCHC RBC-ENTMCNC: 30.4 PG — SIGNIFICANT CHANGE UP (ref 27–34)
MCHC RBC-ENTMCNC: 32.8 G/DL — SIGNIFICANT CHANGE UP (ref 32–36)
MCV RBC AUTO: 92.9 FL — SIGNIFICANT CHANGE UP (ref 80–100)
NITRITE UR-MCNC: NEGATIVE — SIGNIFICANT CHANGE UP
NRBC BLD AUTO-RTO: 0 /100 WBCS — SIGNIFICANT CHANGE UP (ref 0–0)
PH UR: 6.5 — SIGNIFICANT CHANGE UP (ref 5–8)
PHOSPHATE SERPL-MCNC: 5.7 MG/DL — HIGH (ref 2.5–4.5)
PLATELET # BLD AUTO: 109 K/UL — LOW (ref 150–400)
POTASSIUM SERPL-MCNC: 4.2 MMOL/L — SIGNIFICANT CHANGE UP (ref 3.5–5.3)
POTASSIUM SERPL-SCNC: 4.2 MMOL/L — SIGNIFICANT CHANGE UP (ref 3.5–5.3)
PROT SERPL-MCNC: 6.5 G/DL — SIGNIFICANT CHANGE UP (ref 6–8.3)
PROT UR-MCNC: 100 MG/DL
RBC # BLD: 3.09 M/UL — LOW (ref 4.2–5.8)
RBC # FLD: 13.2 % — SIGNIFICANT CHANGE UP (ref 10.3–14.5)
RBC CASTS # UR COMP ASSIST: 689 /HPF — HIGH (ref 0–4)
SODIUM SERPL-SCNC: 139 MMOL/L — SIGNIFICANT CHANGE UP (ref 135–145)
SP GR SPEC: 1.01 — SIGNIFICANT CHANGE UP (ref 1–1.03)
SQUAMOUS # UR AUTO: 4 /HPF — SIGNIFICANT CHANGE UP (ref 0–5)
TACROLIMUS SERPL-MCNC: 7.2 NG/ML — SIGNIFICANT CHANGE UP
UROBILINOGEN FLD QL: 0.2 MG/DL — SIGNIFICANT CHANGE UP (ref 0.2–1)
WBC # BLD: 2.31 K/UL — LOW (ref 3.8–10.5)
WBC # FLD AUTO: 2.31 K/UL — LOW (ref 3.8–10.5)
WBC UR QL: 10 /HPF — HIGH (ref 0–5)

## 2025-03-04 PROCEDURE — 85730 THROMBOPLASTIN TIME PARTIAL: CPT

## 2025-03-04 PROCEDURE — 86900 BLOOD TYPING SEROLOGIC ABO: CPT

## 2025-03-04 PROCEDURE — C2617: CPT

## 2025-03-04 PROCEDURE — 97530 THERAPEUTIC ACTIVITIES: CPT

## 2025-03-04 PROCEDURE — 82947 ASSAY GLUCOSE BLOOD QUANT: CPT

## 2025-03-04 PROCEDURE — 81001 URINALYSIS AUTO W/SCOPE: CPT

## 2025-03-04 PROCEDURE — 84100 ASSAY OF PHOSPHORUS: CPT

## 2025-03-04 PROCEDURE — 87522 HEPATITIS C REVRS TRNSCRPJ: CPT

## 2025-03-04 PROCEDURE — 82435 ASSAY OF BLOOD CHLORIDE: CPT

## 2025-03-04 PROCEDURE — 93005 ELECTROCARDIOGRAM TRACING: CPT

## 2025-03-04 PROCEDURE — 84132 ASSAY OF SERUM POTASSIUM: CPT

## 2025-03-04 PROCEDURE — 83735 ASSAY OF MAGNESIUM: CPT

## 2025-03-04 PROCEDURE — 87637 SARSCOV2&INF A&B&RSV AMP PRB: CPT

## 2025-03-04 PROCEDURE — 82803 BLOOD GASES ANY COMBINATION: CPT

## 2025-03-04 PROCEDURE — 97116 GAIT TRAINING THERAPY: CPT

## 2025-03-04 PROCEDURE — 82330 ASSAY OF CALCIUM: CPT

## 2025-03-04 PROCEDURE — 76776 US EXAM K TRANSPL W/DOPPLER: CPT

## 2025-03-04 PROCEDURE — 36415 COLL VENOUS BLD VENIPUNCTURE: CPT

## 2025-03-04 PROCEDURE — 83605 ASSAY OF LACTIC ACID: CPT

## 2025-03-04 PROCEDURE — 99261: CPT

## 2025-03-04 PROCEDURE — 97161 PT EVAL LOW COMPLEX 20 MIN: CPT

## 2025-03-04 PROCEDURE — 87389 HIV-1 AG W/HIV-1&-2 AB AG IA: CPT

## 2025-03-04 PROCEDURE — 86803 HEPATITIS C AB TEST: CPT

## 2025-03-04 PROCEDURE — C9399: CPT

## 2025-03-04 PROCEDURE — 85018 HEMOGLOBIN: CPT

## 2025-03-04 PROCEDURE — 80053 COMPREHEN METABOLIC PANEL: CPT

## 2025-03-04 PROCEDURE — 82962 GLUCOSE BLOOD TEST: CPT

## 2025-03-04 PROCEDURE — 99232 SBSQ HOSP IP/OBS MODERATE 35: CPT | Mod: GC

## 2025-03-04 PROCEDURE — 71045 X-RAY EXAM CHEST 1 VIEW: CPT

## 2025-03-04 PROCEDURE — 86706 HEP B SURFACE ANTIBODY: CPT

## 2025-03-04 PROCEDURE — C1769: CPT

## 2025-03-04 PROCEDURE — 85027 COMPLETE CBC AUTOMATED: CPT

## 2025-03-04 PROCEDURE — 86850 RBC ANTIBODY SCREEN: CPT

## 2025-03-04 PROCEDURE — 86901 BLOOD TYPING SEROLOGIC RH(D): CPT

## 2025-03-04 PROCEDURE — 80197 ASSAY OF TACROLIMUS: CPT

## 2025-03-04 PROCEDURE — 85025 COMPLETE CBC W/AUTO DIFF WBC: CPT

## 2025-03-04 PROCEDURE — 85610 PROTHROMBIN TIME: CPT

## 2025-03-04 PROCEDURE — 83036 HEMOGLOBIN GLYCOSYLATED A1C: CPT

## 2025-03-04 PROCEDURE — 87340 HEPATITIS B SURFACE AG IA: CPT

## 2025-03-04 PROCEDURE — 85014 HEMATOCRIT: CPT

## 2025-03-04 PROCEDURE — 84295 ASSAY OF SERUM SODIUM: CPT

## 2025-03-04 PROCEDURE — 83615 LACTATE (LD) (LDH) ENZYME: CPT

## 2025-03-04 PROCEDURE — 80048 BASIC METABOLIC PNL TOTAL CA: CPT

## 2025-03-04 PROCEDURE — 86704 HEP B CORE ANTIBODY TOTAL: CPT

## 2025-03-04 RX ORDER — MYCOPHENOLATE MOFETIL 500 MG/1
2 TABLET, FILM COATED ORAL
Qty: 0 | Refills: 0 | DISCHARGE
Start: 2025-03-04

## 2025-03-04 RX ORDER — TACROLIMUS 0.5 MG/1
2 CAPSULE ORAL
Qty: 0 | Refills: 0 | DISCHARGE
Start: 2025-03-04

## 2025-03-04 RX ORDER — SENNA 187 MG
1 TABLET ORAL
Qty: 0 | Refills: 0 | DISCHARGE
Start: 2025-03-04

## 2025-03-04 RX ORDER — CARVEDILOL 3.12 MG/1
1 TABLET, FILM COATED ORAL
Qty: 60 | Refills: 0
Start: 2025-03-04 | End: 2025-04-02

## 2025-03-04 RX ORDER — GABAPENTIN 400 MG/1
1 CAPSULE ORAL
Qty: 0 | Refills: 0 | DISCHARGE
Start: 2025-03-04

## 2025-03-04 RX ORDER — PREDNISONE 20 MG/1
5 TABLET ORAL
Qty: 0 | Refills: 0 | DISCHARGE
Start: 2025-03-04

## 2025-03-04 RX ORDER — TAMSULOSIN HYDROCHLORIDE 0.4 MG/1
1 CAPSULE ORAL
Qty: 0 | Refills: 0 | DISCHARGE
Start: 2025-03-04

## 2025-03-04 RX ORDER — VALGANCICLOVIR 450 MG/1
1 TABLET, FILM COATED ORAL
Qty: 0 | Refills: 0 | DISCHARGE
Start: 2025-03-04

## 2025-03-04 RX ORDER — SULFAMETHOXAZOLE/TRIMETHOPRIM 800-160 MG
1 TABLET ORAL
Qty: 0 | Refills: 0 | DISCHARGE
Start: 2025-03-04

## 2025-03-04 RX ORDER — FLUCONAZOLE 150 MG
1 TABLET ORAL
Qty: 0 | Refills: 0 | DISCHARGE
Start: 2025-03-04

## 2025-03-04 RX ORDER — CARVEDILOL 3.12 MG/1
12.5 TABLET, FILM COATED ORAL EVERY 12 HOURS
Refills: 0 | Status: DISCONTINUED | OUTPATIENT
Start: 2025-03-04 | End: 2025-03-04

## 2025-03-04 RX ORDER — SENNA 187 MG
1 TABLET ORAL DAILY
Refills: 0 | Status: DISCONTINUED | OUTPATIENT
Start: 2025-03-04 | End: 2025-03-04

## 2025-03-04 RX ORDER — CARVEDILOL 3.12 MG/1
1 TABLET, FILM COATED ORAL
Qty: 0 | Refills: 0 | DISCHARGE
Start: 2025-03-04

## 2025-03-04 RX ADMIN — HEPARIN SODIUM 5000 UNIT(S): 1000 INJECTION INTRAVENOUS; SUBCUTANEOUS at 17:06

## 2025-03-04 RX ADMIN — INSULIN LISPRO 4: 100 INJECTION, SOLUTION INTRAVENOUS; SUBCUTANEOUS at 12:29

## 2025-03-04 RX ADMIN — CARVEDILOL 12.5 MILLIGRAM(S): 3.12 TABLET, FILM COATED ORAL at 17:06

## 2025-03-04 RX ADMIN — MYCOPHENOLATE MOFETIL 500 MILLIGRAM(S): 500 TABLET, FILM COATED ORAL at 07:54

## 2025-03-04 RX ADMIN — TRAMADOL HYDROCHLORIDE 25 MILLIGRAM(S): 50 TABLET, FILM COATED ORAL at 11:49

## 2025-03-04 RX ADMIN — Medication 20 MILLIGRAM(S): at 11:19

## 2025-03-04 RX ADMIN — TRAMADOL HYDROCHLORIDE 50 MILLIGRAM(S): 50 TABLET, FILM COATED ORAL at 07:23

## 2025-03-04 RX ADMIN — TRAMADOL HYDROCHLORIDE 50 MILLIGRAM(S): 50 TABLET, FILM COATED ORAL at 16:05

## 2025-03-04 RX ADMIN — TRAMADOL HYDROCHLORIDE 50 MILLIGRAM(S): 50 TABLET, FILM COATED ORAL at 06:23

## 2025-03-04 RX ADMIN — HEPARIN SODIUM 5000 UNIT(S): 1000 INJECTION INTRAVENOUS; SUBCUTANEOUS at 06:17

## 2025-03-04 RX ADMIN — PREDNISONE 5 MILLIGRAM(S): 20 TABLET ORAL at 06:18

## 2025-03-04 RX ADMIN — POLYETHYLENE GLYCOL 3350 17 GRAM(S): 17 POWDER, FOR SOLUTION ORAL at 11:18

## 2025-03-04 RX ADMIN — TACROLIMUS 8 MILLIGRAM(S): 0.5 CAPSULE ORAL at 07:55

## 2025-03-04 RX ADMIN — CARVEDILOL 6.25 MILLIGRAM(S): 3.12 TABLET, FILM COATED ORAL at 06:18

## 2025-03-04 RX ADMIN — TRAMADOL HYDROCHLORIDE 25 MILLIGRAM(S): 50 TABLET, FILM COATED ORAL at 12:49

## 2025-03-04 RX ADMIN — Medication 1 APPLICATION(S): at 11:18

## 2025-03-04 RX ADMIN — Medication 200 MILLIGRAM(S): at 11:19

## 2025-03-04 RX ADMIN — TRAMADOL HYDROCHLORIDE 50 MILLIGRAM(S): 50 TABLET, FILM COATED ORAL at 17:05

## 2025-03-04 NOTE — PROGRESS NOTE ADULT - ASSESSMENT
70 y/o M PMHX DM (retinopathy,neuropathy), HTN, anemia, chronic CHF, TTE w/ grade II diastolic dysfunction mild/mod MR and mild TR (2023), HLD, and CKD ESRD on HD since Jan 2023 via LUE AVF that presents as a direct admit for DDRT. Patient's last HD session was 2/24/2025 prior to admission, states he makes slightly more than 1 cup of urine per day. Patient denies any acute complaints at this time, feels in his usual state of health.   Patient is now s/p DDRT 2a on patch/1v/1u + stent on 2/25/2025 under Thymoglobulin induction    [] POD#7 DDRT  -DGF: HD 2/27, 3/1, 3/3   - strict I's & O's: carlo mcfadden'ed, passed TOV   - Renal Doppler: patent, small collection 4x2 cm  - SCDs/PT/IS  - SQH   - RLQ swelling near txp site, 3/1 rpt US kidney showed no change in fluid collection from prior   - lasix PRN   - flomax 0.4qhs  - donor +ucx candida, on fluc     []immuno   -Envarsus by level,  BID, SST   -ppx: fluc, valcyte MWF, bactrim MWF       [] HTN  - coreg 12.5 BID     [] DM  -ISS  -Podiatry consult (pt to miss scheduled appt tomorrow as outpt)

## 2025-03-04 NOTE — DISCHARGE NOTE NURSING/CASE MANAGEMENT/SOCIAL WORK - PATIENT PORTAL LINK FT
You can access the FollowMyHealth Patient Portal offered by [facility] by registering at the following website: [email or web address]. By joining [employer]’s FollowMyHealth portal, you will also be able to view your health information using other applications (apps) compatible with our system.

## 2025-03-04 NOTE — DISCHARGE NOTE PROVIDER - NSDCCPCAREPLAN_GEN_ALL_CORE_FT
PRINCIPAL DISCHARGE DIAGNOSIS  Diagnosis: -donor kidney transplant recipient  Assessment and Plan of Treatment: DC instructions:   Renal txp recipient:   - Pain: You will have some pain after surgery. Take pain medication as prescribed. Avoid Aspirin, Motrin and Ibuprofen, unless instructed by the team. You should NOT drive while on narcotic medications.   -Incision: Do NOT take bath until your incision is healed and you are cleared by your surgeon. You may shower. Gently cleanse the area around your incision with only soap and water and pat dry. Check it daily.  Keep incision uncovered and open to air.   -Activity: avoid strenuous activity/exercise and heavy lifting for the first 6-8 weeks after surgery. You should not lift anything over 10 lbs. You should not drive until cleared by your transplant team. Avoid straining. Use stool softeners as needed. Stop stool softeners if diarrhea develops.   -Follow FOOD SAFETY instruction provided to you in your patient education guide booklet   -Women:  using adequate contraception is highly recommended. In general, women who receive a transplant should not become pregnant for at least 18 months after transplant.   -Skin care: Transplant medication can increase your risk for skin cancer. Avoid extended exposure to the sun and wear SPH 30 sunscreen or higher. Visit a dermologist at least once a year.   -You should have an annual eye exam.   -You should never smoke.   Call  the Transplant team if  you experience any of the following:   [] Fever of 100.3F (38C) or above  [] Surgical incision is bleeding, red or warm to touch or there's discharge from the incision  [] Worsening abdominal pain, nausea or vomiting  [] Shortness of breath  [] Difficulty with urinating such as burning, frequency or urgency, blood in urine   Immunosuppression  - Transplant recipients are at risk for developing infection because immune system is lowered due to transplant medications.   - Avoid contact with sick people; practice good hand hygiene;   - Take medications as directed by your transplant team. Never stop taking medications unless instructed by your transplant physician

## 2025-03-04 NOTE — PROGRESS NOTE ADULT - TIME BILLING
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 DM, Anemia  Patient seen, examined and reviewed available clinical and lab data including history,  progress notes and consult notes.  Reviewed immunosuppression and allograft function including urine out put, creatinine trend, urine studies and any allograft and bladder imaging.  Reviewed medication regimen for glycemic control and prophylaxis  Suggestions:  Thymoglobulin as per protocol  Tacrolimus target trough level 8-10 ng/ml  Monitor I/o, Blood chemistry  Will follow  hemodialysis tomorrow M/W/F schedule  I was present during and reviewed clinical and lab data as well as assessment and plan as documented . Please contact if any additional questions with any change in clinical condition or on availability of any additional information or reports.
Kidney recipient post op day 2  Thymo induction, Tac/MMF/pred maintenance  Oliguric  Noted comorbidities, immunosuppression, prophylaxis clinical, lab, imaging data, I/O, medication regimen  Suggestions:  Hemodialysis with fluid removal Keep SBP>110 mm Hg  Tacrolimus target 8-10 ng/ml  Thymo as per protocol, dose held for wbc/plt  Will follow  I was present during and reviewed clinical and lab data as well as assessment and plan as documented by the house staff as noted. Please contact if any additional questions with any change in clinical condition or on availability of any additional information or reports.
Kidney recipient post op day 3  Thymo induction, Tac/MMF/pred maintenance  Oliguric  Noted comorbidities, immunosuppression, prophylaxis clinical, lab, imaging data, I/O, medication regimen  Suggestions:  Monitor for dialysis need  Tacrolimus target 8-10 ng/ml  Thymo as per protocol, dose adjusted for cell counts  Will follow  I was present during and reviewed clinical and lab data as well as assessment and plan as documented by the house staff as noted. Please contact if any additional questions with any change in clinical condition or on availability of any additional information or reports.
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 DM, Anemia  Patient seen, examined and reviewed available clinical and lab data including history,  progress notes and consult notes.  Reviewed immunosuppression and allograft function including urine out put, creatinine trend, urine studies and any allograft and bladder imaging.  Reviewed medication regimen for glycemic control and prophylaxis  Suggestions:  Thymoglobulin as per protocol  Tacrolimus target trough level 8-10 ng/ml  Monitor I/o, Blood chemistry  Will follow  hemodialysis done today  I was present during and reviewed clinical and lab data as well as assessment and plan as documented . Please contact if any additional questions with any change in clinical condition or on availability of any additional information or reports.
Patient seen, examined and reviewed available clinical and lab data including history,  progress notes and consult notes.  Reviewed immunosuppression and allograft function including urine out put, creatinine trend, urine studies and any allograft and bladder imaging.  Comorbidities reviewed. 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 Kerr is his outpatient nephrologist  -ureteral stent is still in place    Immunosuppression- Thymoglobulin as per protocol  Tacrolimus target trough level 8-10 ng/ml* patient on fluconazole  -MMF 500mg BID, pred taper  -check DSA today  -PRA 1%    Prophylaxis-  Donor with Candida on culture- recipient currently on fluconazole 200mg daily    HTN- BPs elevated- agree with coreg 6.25mg BID  Monitor I/o, Blood chemistry  Will follow    I was present during and reviewed clinical and lab data as well as assessment and plan as documented . Please contact if any additional questions with any change in clinical condition or on availability of any additional information or reports.
Patient seen, examined and reviewed available clinical and lab data including history,  progress notes and consult notes.  Reviewed immunosuppression and allograft function including urine out put, creatinine trend, urine studies and any allograft and bladder imaging.  Comorbidities reviewed. 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 Kerr today- he is aware that patient to return to HD tomorrow on MWF schedule  -ureteral stent is still in place    Immunosuppression- Thymoglobulin as per protocol  Tacrolimus target trough level 8-10 ng/ml* patient on fluconazole  -MMF 500mg BID, pred taper  -check DSA today  -PRA 1%    Prophylaxis-  Donor with Candida on culture- recipient currently on fluconazole 200mg daily till 3/15 then 100mg till 3/28    HTN- BPs elevated-increase coreg to 12.5mg BID  Monitor I/o, Blood chemistry  Will follow    I was present during and reviewed clinical and lab data as well as assessment and plan as documented . Please contact if any additional questions with any change in clinical condition or on availability of any additional information or reports.

## 2025-03-04 NOTE — DISCHARGE NOTE NURSING/CASE MANAGEMENT/SOCIAL WORK - NSDCFUADDAPPT_GEN_ALL_CORE_FT
FOLLOW-UP:  1. Follow up in Transplant Clinic on ______.   2. Follow up in 4-6 weeks with Surgeon for removal of ureteral stent

## 2025-03-04 NOTE — PROGRESS NOTE ADULT - PROVIDER SPECIALTY LIST ADULT
Transplant Nephrology
Transplant Surgery
Pharmacy
Transplant Surgery
Transplant Nephrology
Transplant Surgery
Transplant Nephrology
Transplant Nephrology

## 2025-03-04 NOTE — DISCHARGE NOTE NURSING/CASE MANAGEMENT/SOCIAL WORK - NSDCPEFALRISK_GEN_ALL_CORE
For information on Fall & Injury Prevention, visit: [email or web address] OR  [email or web address] OR  https://www.cdc.gov/steadi/patient.html

## 2025-03-04 NOTE — DISCHARGE NOTE PROVIDER - NSDCMRMEDTOKEN_GEN_ALL_CORE_FT
amLODIPine 10 mg oral tablet: 1 tab(s) orally once a day  atenolol 25 mg oral tablet: 1 tab(s) orally once a day  carvedilol 6.25 mg oral tablet: 1 tab(s) orally 2 times a day  doxazosin 2 mg oral tablet: 1 tab(s) orally once a day (at bedtime)  fluconazole 100 mg oral tablet: 1 tab(s) orally once a day  fluconazole 200 mg oral tablet: 1 tab(s) orally once a day  hydrALAZINE 100 mg oral tablet: 1 tab(s) orally every 8 hours  tamsulosin 0.4 mg oral capsule: 1 cap(s) orally once (at bedtime)   carvedilol 12.5 mg oral tablet: 1 tab(s) orally every 12 hours  famotidine 20 mg oral tablet: 1 tab(s) orally once a day  fluconazole 200 mg oral tablet: 1 tab(s) orally once a day  gabapentin 300 mg oral capsule: 1 cap(s) orally once a day (at bedtime) As needed neuropathy  mycophenolate mofetil 250 mg oral capsule: 2 cap(s) orally 2 times a day  predniSONE: 5mg tablet once a day  sulfamethoxazole-trimethoprim 400 mg-80 mg oral tablet: 1 tab(s) orally Monday, Wednesday, and Friday  tacrolimus 4 mg oral tablet, extended release: 2 tab(s) orally once a day (in the morning)  tamsulosin 0.4 mg oral capsule: 1 cap(s) orally once a day (at bedtime)  valGANciclovir 450 mg oral tablet: 1 tab(s) orally Monday, Wednesday, and Friday   carvedilol 12.5 mg oral tablet: 1 tab(s) orally every 12 hours  famotidine 20 mg oral tablet: 1 tab(s) orally once a day  fluconazole 200 mg oral tablet: 1 tab(s) orally once a day  gabapentin 300 mg oral capsule: 1 cap(s) orally once a day (at bedtime) As needed neuropathy  mycophenolate mofetil 250 mg oral capsule: 2 cap(s) orally 2 times a day  predniSONE: 5 milligram(s) once a day  senna leaf extract oral tablet: 1 tab(s) orally once a day As needed Constipation  sulfamethoxazole-trimethoprim 400 mg-80 mg oral tablet: 1 tab(s) orally Monday, Wednesday, and Friday  tacrolimus 4 mg oral tablet, extended release: 2 tab(s) orally once a day (in the morning)  tamsulosin 0.4 mg oral capsule: 1 cap(s) orally once a day (at bedtime)  valGANciclovir 450 mg oral tablet: 1 tab(s) orally Monday, Wednesday, and Friday

## 2025-03-04 NOTE — PROGRESS NOTE ADULT - SUBJECTIVE AND OBJECTIVE BOX
Transplant Surgery - Multidisciplinary rounds   --------------------------------------------------------------   Tx Date:  2/25/2025       POD#7    Present: Patient seen and examined with multidisciplinary Transplant team including Surgeon Dr Mata Nephrologist Dr Waters, Surgical fellow, Nephrology fellow, KOSTA Joshua/Ro, Pharmacist, Nutritionist,  and bedside RN during AM rounds.   Disciplines not in attendance will be notified of the plan.     HPI: 70 y/o M PMHX DM (retinopathy,neuropathy), HTN, anemia, chronic CHF, TTE w/ grade II diastolic dysfunction mild/mod MR and mild TR (2023), HLD, and CKD ESRD on HD since Jan 2023 via LUE AVF that presents as a direct admit for DDRT. Patient's last HD session was 2/24/2025 prior to admission, states he makes slightly more than 1 cup of urine per day. Patient denies any acute complaints at this time, feels in his usual state of health.     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 1000 milliLiter(s) (50 mL/Hr) IV Continuous <Continuous>  dextrose 5%. 1000 milliLiter(s) (100 mL/Hr) IV Continuous <Continuous>  dextrose 50% Injectable 25 Gram(s) IV Push once  dextrose 50% Injectable 12.5 Gram(s) IV Push once  dextrose 50% Injectable 25 Gram(s) IV Push once  famotidine    Tablet 20 milliGRAM(s) Oral daily  fluconAZOLE   Tablet 200 milliGRAM(s) Oral daily  glucagon  Injectable 1 milliGRAM(s) IntraMuscular once  heparin   Injectable 5000 Unit(s) SubCutaneous every 12 hours  insulin lispro (ADMELOG) corrective regimen sliding scale   SubCutaneous Before meals and at bedtime  mycophenolate mofetil 500 milliGRAM(s) Oral <User Schedule>  polyethylene glycol 3350 17 Gram(s) Oral daily  predniSONE   Tablet   Oral   predniSONE   Tablet 5 milliGRAM(s) Oral daily  tacrolimus ER Tablet (ENVARSUS XR) 8 milliGRAM(s) Oral <User Schedule>  tamsulosin 0.4 milliGRAM(s) Oral at bedtime  trimethoprim   80 mG/sulfamethoxazole 400 mG 1 Tablet(s) Oral <User Schedule>  valGANciclovir 450 milliGRAM(s) Oral <User Schedule>    MEDICATIONS  (PRN):  dextrose Oral Gel 15 Gram(s) Oral once PRN Blood Glucose LESS THAN 70 milliGRAM(s)/deciliter  gabapentin 300 milliGRAM(s) Oral at bedtime PRN neuropathy  HYDROmorphone  Injectable 0.5 milliGRAM(s) IV Push every 10 minutes PRN Moderate Pain (4 - 6)  senna 1 Tablet(s) Oral daily PRN Constipation  traMADol 25 milliGRAM(s) Oral every 8 hours PRN Moderate Pain (4 - 6)      PAST MEDICAL & SURGICAL HISTORY:  HTN (hypertension)      Type 2 diabetes mellitus      ESRD on dialysis  mwf      Dyslipidemia      S/P arteriovenous (AV) fistula creation          Vital Signs Last 24 Hrs  T(C): 36.4 (04 Mar 2025 13:06), Max: 36.9 (04 Mar 2025 09:00)  T(F): 97.6 (04 Mar 2025 13:06), Max: 98.4 (04 Mar 2025 09:00)  HR: 71 (04 Mar 2025 13:06) (64 - 81)  BP: 144/69 (04 Mar 2025 13:06) (140/66 - 171/86)  BP(mean): 105 (04 Mar 2025 06:16) (95 - 118)  RR: 18 (04 Mar 2025 13:06) (17 - 18)  SpO2: 96% (04 Mar 2025 13:06) (96% - 98%)    Parameters below as of 04 Mar 2025 13:06  Patient On (Oxygen Delivery Method): room air        I&O's Summary    03 Mar 2025 07:01  -  04 Mar 2025 07:00  --------------------------------------------------------  IN: 1080 mL / OUT: 1435 mL / NET: -355 mL    04 Mar 2025 07:01  -  04 Mar 2025 17:00  --------------------------------------------------------  IN: 600 mL / OUT: 235 mL / NET: 365 mL                              9.4    2.31  )-----------( 109      ( 04 Mar 2025 06:45 )             28.7     03-04    139  |  97  |  59[H]  ----------------------------<  159[H]  4.2   |  28  |  8.71[H]    Ca    8.4      04 Mar 2025 06:45  Phos  5.7     03-04  Mg     2.3     03-04    TPro  6.5  /  Alb  3.5  /  TBili  0.3  /  DBili  x   /  AST  14  /  ALT  <5[L]  /  AlkPhos  77  03-04    Tacrolimus (), Serum: 7.2 ng/mL (03-04 @ 06:45)      Review of systems  All other systems were reviewed and are negative, except as noted.      PHYSICAL EXAM:  Constitutional: Well developed / well nourished  Eyes: Anicteric, PERRLA  ENMT: nc/at  Neck: supple  Respiratory: CTA B/L  Cardiovascular: RRR  Gastrointestinal: Soft, non distended, mild tenderness at the incision site; incision c/d/i; JULES x2: JULES 1 serous, JULES 2 SS   Genitourinary: Urinary catheter in place, low UO   Extremities: SCD's in place and working bilaterally, AVF LUE  Vascular: Palpable dp pulses bilaterally  Neurological: A&O x3  Skin: no rashes, ulcerations or lesions;  Musculoskeletal: Moving all extremities  Psychiatric: Responsive

## 2025-03-04 NOTE — DISCHARGE NOTE PROVIDER - CARE PROVIDER_API CALL
Elliot Adame  Nephrology  [address] [place] [id number]  Phone: [phone]  Fax: [phone]  Follow Up Time: 1 week

## 2025-03-04 NOTE — DISCHARGE NOTE PROVIDER - NSDCDCMDCOMP_GEN_ALL_CORE
MELOXICAM PO, Take by mouth, Disp: , Rfl:     meloxicam (MOBIC) 15 MG tablet, Take 1 tablet by mouth daily, Disp: 30 tablet, Rfl: 3    levothyroxine (SYNTHROID) 100 MCG tablet, Take 1 tablet by mouth Daily (Patient not taking: Reported on 1/6/2020), Disp: 30 tablet, Rfl: 1        Subjective:      Review of Systems   Constitutional: Negative. HENT: Negative. Eyes: Negative. Respiratory: Negative. Cardiovascular: Negative. Gastrointestinal: Negative. Endocrine: Negative. Genitourinary: Negative. Musculoskeletal: Positive for arthralgias, back pain, myalgias, neck pain and neck stiffness. Allergic/Immunologic: Negative. Neurological: Positive for weakness and headaches. Hematological: Negative. Psychiatric/Behavioral: Negative.          Objective:      Vitals                  Weight: 184 lb 11.9 oz (83.8 kg)   Height: 5' 6.73\" (1.695 m)            Physical Exam  Constitutional:       General: She is in acute distress. Appearance: Normal appearance. She is normal weight. HENT:      Head: Normocephalic and atraumatic. Right Ear: Tympanic membrane normal.      Left Ear: Tympanic membrane normal.      Nose: Nose normal.      Mouth/Throat:      Mouth: Mucous membranes are moist.      Pharynx: Oropharynx is clear. Eyes:      Extraocular Movements: Extraocular movements intact. Conjunctiva/sclera: Conjunctivae normal.      Pupils: Pupils are equal, round, and reactive to light. Neck:      Musculoskeletal: Normal range of motion and neck supple. Cardiovascular:      Rate and Rhythm: Regular rhythm. Tachycardia present. Pulses: Normal pulses. Heart sounds: Normal heart sounds. Pulmonary:      Effort: Pulmonary effort is normal.      Breath sounds: Normal breath sounds. Abdominal:      General: Abdomen is flat. Bowel sounds are normal.      Palpations: Abdomen is soft. Musculoskeletal:         General: Tenderness present.       Right shoulder: She This document is complete and the patient is ready for discharge.

## 2025-03-04 NOTE — PROGRESS NOTE ADULT - ASSESSMENT
70 y/o M PMHX DM (retinopathy,neuropathy), HTN, anemia, chronic CHF, TTE w/ grade II diastolic dysfunction mild/mod MR and mild TR (2023), HLD, and CKD ESRD on HD since Jan 2023 via LUE AVF that presents as a direct admit for DDRT on 2/25/25. Transplant nephrology consulted for management of DDRT.     1. S/p DDRT 2/25/25 (ureteral stent x 1 placed; 1V / 2A anastomosed to single patch/1 U (stented). CIT 36 hours 30 min  - DDRT complicated by DGF requiring HD (pt follows with Dr. Kerr)  - Last HD on 3/3/25 with minimal UF as plan to raise dry weight to allow for renal perfusion post op   - Repeat Transplant US: renal transplant with patent vasculature and homogeneous flow. No evidence of a significant renal artery stenosis. Interval decrease in size of lower pole collection  - Monitor labs and UOP. Avoid any potential nephrotoxins (NSAIDs/ACEi/ARBs) when possible. Dose medications as per eGFR/HD.     2. Immunosuppression   - Thymo induction  - Envarsus per level   - MMF 1g PO BID  - SST  - PPx: Valcyte, Flucanozole, Famotidine, Bactrim    3. DM  - Insulin, Admelog ISS

## 2025-03-04 NOTE — PROGRESS NOTE ADULT - ATTENDING COMMENTS
Patient reports feeling ok and reports being below his dry weight.
Patient reports not getting much sleep last night. Cousin will meet him at home. Tolerated HD yesterday with UF 500cc only. EDW 91kg per patient.

## 2025-03-04 NOTE — DISCHARGE NOTE NURSING/CASE MANAGEMENT/SOCIAL WORK - FINANCIAL ASSISTANCE
[facility] provides services at a reduced cost to those who are determined to be eligible through [facility]’s financial assistance program. Information regarding [facility]’s financial assistance program can be found by going to [email or web address] or by calling [phone].

## 2025-03-04 NOTE — DISCHARGE NOTE PROVIDER - NSDCFUADDAPPT_GEN_ALL_CORE_FT
FOLLOW-UP:  1. Follow up in Transplant Clinic on ______.   2. Follow up in 4-6 weeks with Surgeon for removal of ureteral stent FOLLOW-UP:  1. Follow up in Transplant Clinic on Monday 3/10 with Dr Aadme.  2. Follow up in 4-6 weeks with Surgeon for removal of ureteral stent. FOLLOW-UP:  1. Repeat labs and Urine cultures on 3/6.  2. Follow up in Transplant Clinic on Monday 3/10 with Dr Adame.  3. Follow up in 4-6 weeks with Surgeon for removal of ureteral stent.

## 2025-03-04 NOTE — DISCHARGE NOTE PROVIDER - NSDCFUSCHEDAPPT_GEN_ALL_CORE_FT
Stephanie Galloway  [facility]  NEPHRO [address] [place] [address]  Scheduled Appointment: 05/15/2025

## 2025-03-04 NOTE — PROGRESS NOTE ADULT - SUBJECTIVE AND OBJECTIVE BOX
[facility] DIVISION OF KIDNEY DISEASES AND HYPERTENSION   FOLLOW UP NOTE  --------------------------------------------------------------------------------  Chief Complaint: DDRT 2/26/25 with DGF requiring HD    24 hour events/subjective: Pt. was seen and examined. Pt received HD on 3/3/25. Pt is feeling "good". Pt denied n/v/f/c/sob. No urgent HD needs for today.     PAST HISTORY  --------------------------------------------------------------------------------  No significant changes to PMH, PSH, FHx, SHx, unless otherwise noted    ALLERGIES & MEDICATIONS  --------------------------------------------------------------------------------  Allergies  No Known Allergies    Intolerances    Standing Inpatient Medications  carvedilol 12.5 milliGRAM(s) Oral every 12 hours  chlorhexidine 2% Cloths 1 Application(s) Topical daily  dextrose 5%. 1000 milliLiter(s) IV Continuous <Continuous>  dextrose 5%. 1000 milliLiter(s) IV Continuous <Continuous>  dextrose 50% Injectable 25 Gram(s) IV Push once  dextrose 50% Injectable 12.5 Gram(s) IV Push once  dextrose 50% Injectable 25 Gram(s) IV Push once  famotidine    Tablet 20 milliGRAM(s) Oral daily  fluconAZOLE   Tablet 200 milliGRAM(s) Oral daily  glucagon  Injectable 1 milliGRAM(s) IntraMuscular once  heparin   Injectable 5000 Unit(s) SubCutaneous every 12 hours  insulin lispro (ADMELOG) corrective regimen sliding scale   SubCutaneous Before meals and at bedtime  mycophenolate mofetil 500 milliGRAM(s) Oral <User Schedule>  polyethylene glycol 3350 17 Gram(s) Oral daily  predniSONE   Tablet   Oral   predniSONE   Tablet 5 milliGRAM(s) Oral daily  tacrolimus ER Tablet (ENVARSUS XR) 8 milliGRAM(s) Oral <User Schedule>  tamsulosin 0.4 milliGRAM(s) Oral at bedtime  trimethoprim   80 mG/sulfamethoxazole 400 mG 1 Tablet(s) Oral <User Schedule>  valGANciclovir 450 milliGRAM(s) Oral <User Schedule>    PRN Inpatient Medications  dextrose Oral Gel 15 Gram(s) Oral once PRN  gabapentin 300 milliGRAM(s) Oral at bedtime PRN  HYDROmorphone  Injectable 0.5 milliGRAM(s) IV Push every 10 minutes PRN  traMADol 25 milliGRAM(s) Oral every 8 hours PRN  traMADol 50 milliGRAM(s) Oral every 8 hours PRN    REVIEW OF SYSTEMS  --------------------------------------------------------------------------------  All other systems were reviewed and are negative, except as noted.    VITALS/PHYSICAL EXAM  --------------------------------------------------------------------------------  T(C): 36.4 (03-04-25 @ 13:06), Max: 36.9 (03-04-25 @ 09:00)  HR: 71 (03-04-25 @ 13:06) (64 - 81)  BP: 144/69 (03-04-25 @ 13:06) (140/66 - 171/86)  RR: 18 (03-04-25 @ 13:06) (17 - 18)  SpO2: 96% (03-04-25 @ 13:06) (96% - 98%)  Wt(kg): --    Weight (kg): 90 (03-04-25 @ 06:16)    03-03-25 @ 07:01  -  03-04-25 @ 07:00  --------------------------------------------------------  IN: 1080 mL / OUT: 1435 mL / NET: -355 mL    03-04-25 @ 07:01  -  03-04-25 @ 14:41  --------------------------------------------------------  IN: 600 mL / OUT: 135 mL / NET: 465 mL    Physical Exam:  Gen: NAD  HEENT: Anicteric  Pulm: CTA B/L  CV: RRR, S1S2+  Abd: +BS, soft  Transplant: No TTP  : No suprapubic tenderness, JULES drains in place   MSK: No edema    Psych: Normal affect and mood  Skin: Warm  Access: POPPY PERSAUD     LABS/STUDIES  --------------------------------------------------------------------------------              9.4    2.31  >-----------<  109      [03-04-25 @ 06:45]              28.7     139  |  97  |  59  ----------------------------<  159      [03-04-25 @ 06:45]  4.2   |  28  |  8.71        Ca     8.4     [03-04-25 @ 06:45]      Mg     2.3     [03-04-25 @ 06:45]      Phos  5.7     [03-04-25 @ 06:45]    TPro  6.5  /  Alb  3.5  /  TBili  0.3  /  DBili  x   /  AST  14  /  ALT  <5  /  AlkPhos  77  [03-04-25 @ 06:45]    Creatinine Trend:  SCr 8.71 [03-04 @ 06:45]  SCr 11.43 [03-03 @ 06:44]  SCr 9.56 [03-02 @ 06:46]  SCr 12.23 [03-01 @ 07:24]  SCr 9.87 [02-28 @ 06:59]    Urinalysis - [03-04-25 @ 13:40]      Color Orange / Appearance Cloudy / SG 1.012 / pH 6.5      Gluc 100 / Ketone Negative  / Bili Negative / Urobili 0.2       Blood Large / Protein 100 / Leuk Est Small / Nitrite Negative       / WBC 10 / Hyaline  / Gran  / Sq Epi  / Non Sq Epi 4 / Bacteria Negative    HBsAb 11.3      [02-25-25 @ 00:35]  HBsAg Nonreact      [02-25-25 @ 00:35]  HBcAb Reactive      [02-25-25 @ 00:35]  HCV 0.05, Nonreact      [02-25-25 @ 00:35]  HIV Nonreact      [02-25-25 @ 00:34]    Tacrolimus (), Serum: 7.2 ng/mL (03-04 @ 06:45)  Tacrolimus (), Serum: 3.2 ng/mL (03-03 @ 06:44)  Tacrolimus (), Serum: 3.6 ng/mL (03-02 @ 06:58)  Tacrolimus (), Serum: 5.9 ng/mL (03-01 @ 07:26)

## 2025-03-04 NOTE — DISCHARGE NOTE PROVIDER - HOSPITAL COURSE
HPI:70 y/o M PMHX DM (retinopathy,neuropathy), HTN, anemia, chronic CHF, TTE w/ grade II diastolic dysfunction mild/mod MR and mild TR (2023), HLD, and CKD ESRD on HD since Jan 2023 via LUE AVF that is now s/p DCD DDRT 2a/1v/1u+stent under Thymoglobulin induction on 2/25/2025. Post-operative course c/b delayed graft function requiring continued HD.     Pt progressed well from a surgical perspective. Tolerated regular diet, had bowel function, had good pain control with minimal narcotics, and ambulated well with PT.  She/He tolerated all of the new immunosuppression medication regimen.  Medication/diet education was provided regularly by Transplant Pharmacy and Nutrition teams.    She/He was followed closely by our multidisciplinary transplant team:  Surgeons, Hepatologists, ID, Nephrologists, Pharmacists, [place], ACPs, RNs, SW, Coordinators, Dieticians, PT/OT and was deemed safe for discharge with the following plan:     D/C plan:  [] DDRT with DGF  - DGF: HD 2/27, 3/1, 3/3  - US: patent/small collection [id number]  - repeat US 2/27: patent, decrease in size of lower collection  - DSA sent on 3/2 _______,     [] IMMUNO  - Completed Thymoglobulin  - Envarsus ____, MMF _____, pred 5  - PPX: fluconazole, bactrim, valcyte    [] Donor + UCx for candida  - BCx x2 _____, UA/UCx _____  - switched nystatin ppx to fluconazole        [] HTN  - cardene gtt in PACU - weaned off, bridged to coreg 6.25 bid      FOLLOW-UP:  1. Follow up in Transplant Clinic on ______.   2. Follow up in 4-6 weeks with Surgeon for removal of ureteral stent       69M PMHX DM (retinopathy,neuropathy), HTN, anemia, chronic CHF, TTE w/ grade II diastolic dysfunction mild/mod MR and mild TR (2023), HLD, and CKD ESRD on HD since Jan 2023 via LUE AVF that is now s/p DCD DDRT 2a/1v/1u+stent under Thymoglobulin induction on 2/25/2025. Post-operative course c/b delayed graft function requiring continued HD.     Pt progressed well from a surgical perspective. Tolerated regular diet, had bowel function, had good pain control with minimal narcotics, and ambulated well with PT.  He tolerated all of the new immunosuppression medication regimen.  Medication/diet education was provided regularly by Transplant Pharmacy and Nutrition teams.    He was followed closely by our multidisciplinary transplant team:  Surgeons, Hepatologists, ID, Nephrologists, Pharmacists, [place], ACPs, RNs, SW, Coordinators, Dieticians, PT/OT and was deemed safe for discharge with the following plan:     D/C plan:  [] DDRT with DGF  - DGF: HD 2/27, 3/1, 3/3  - follow wp DSA sent on 3/2   - Envarsus 8, MMF 500BID,  pred 5  - PPX: fluconazole, bactrim, valcyte    [] Donor + UCx for candida  - BCx x2 _____, UA/UCx _____  - switched nystatin ppx to fluconazole          FOLLOW-UP:  1. Follow up in Transplant Clinic on ______.   2. Follow up in 4-6 weeks with Surgeon for removal of ureteral stent       69M PMHX DM (retinopathy,neuropathy), HTN, anemia, chronic CHF, TTE w/ grade II diastolic dysfunction mild/mod MR and mild TR (2023), HLD, and CKD ESRD on HD since Jan 2023 via LUE AVF that is now s/p DCD DDRT 2a/1v/1u+stent under Thymoglobulin induction on 2/25/2025. Post-operative course c/b delayed graft function requiring HD, patient with continue HD outpatient.     Pt progressed well from a surgical perspective. Tolerated regular diet, had bowel function, had good pain control with minimal narcotics, and ambulated well with PT.  He tolerated all of the new immunosuppression medication regimen.  Medication/diet education was provided regularly by Transplant Pharmacy and Nutrition teams.    He was followed closely by our multidisciplinary transplant team:  Surgeons, Hepatologists, ID, Nephrologists, Pharmacists, [place], ACPs, RNs, SW, Coordinators, Dieticians, PT/OT and was deemed safe for discharge with the following plan:     D/C plan:  [] DDRT with DGF  - DGF: HD 2/27, 3/1, 3/3  - follow up DSA sent on 3/2   - Donor + UCx for candida: continue fluconazole 300mg qd, fu donor UA/Ucx   - Envarsus 8, MMF 500BID,  pred 5  - PPX: fluconazole, bactrim, valcyte MWF      FOLLOW-UP:  1. Follow up in Transplant Clinic on Monday 3/10 with Dr Adame.  2. Follow up in 4-6 weeks with Surgeon for removal of ureteral stent.       69M PMHX DM (retinopathy,neuropathy), HTN, anemia, chronic CHF, TTE w/ grade II diastolic dysfunction mild/mod MR and mild TR (2023), HLD, and CKD ESRD on HD since Jan 2023 via LUE AVF that is now s/p DCD DDRT 2a/1v/1u+stent under Thymoglobulin induction on 2/25/2025. Post-operative course c/b delayed graft function requiring HD, patient with continue HD outpatient.     Pt progressed well from a surgical perspective. Tolerated regular diet, had bowel function, had good pain control with minimal narcotics, and ambulated well with PT.  He tolerated all of the new immunosuppression medication regimen.  Medication/diet education was provided regularly by Transplant Pharmacy and Nutrition teams.    He was followed closely by our multidisciplinary transplant team:  Surgeons, Hepatologists, ID, Nephrologists, Pharmacists, [place], ACPs, RNs, SW, Coordinators, Dieticians, PT/OT and was deemed safe for discharge with the following plan:     D/C plan:  [] DDRT with DGF  - DGF: HD 2/27, 3/1, 3/3  - follow up DSA sent on 3/2   - Donor + UCx for candida: continue fluconazole 300mg qd, fu donor UA/Ucx   - Envarsus 8, MMF 500BID,  pred 5  - PPX: fluconazole, bactrim, valcyte MWF      FOLLOW-UP:  1. Repeat labs and Urine cultures on 3/6.  2. Follow up in Transplant Clinic on Monday 3/10 with Dr Adame.  3. Follow up in 4-6 weeks with Surgeon for removal of ureteral stent.       69M PMHX DM (retinopathy,neuropathy), HTN, anemia, chronic CHF, TTE w/ grade II diastolic dysfunction mild/mod MR and mild TR (2023), HLD, and CKD ESRD on HD since Jan 2023 via LUE AVF that is now s/p DCD DDRT 2a/1v/1u+stent under Thymoglobulin induction on 2/25/2025. Post-operative course c/b delayed graft function requiring HD, patient with continue HD outpatient.     Pt progressed well from a surgical perspective. Tolerated regular diet, had bowel function, had good pain control with minimal narcotics, and ambulated well with PT.  He tolerated all of the new immunosuppression medication regimen.  Medication/diet education was provided regularly by Transplant Pharmacy and Nutrition teams.    He was followed closely by our multidisciplinary transplant team:  Surgeons, Hepatologists, ID, Nephrologists, Pharmacists, [place], ACPs, RNs, SW, Coordinators, Dieticians, PT/OT and was deemed safe for discharge with the following plan:     D/C plan:  [] DDRT with DGF  - DGF: HD 2/27, 3/1, 3/3  - follow up DSA sent on 3/2   - Donor + UCx for candida: continue fluconazole 200mg qd, fu donor UA/Ucx   - Envarsus 8, MMF 500BID,  pred 5  - PPX: fluconazole, bactrim, valcyte MWF      FOLLOW-UP:  1. Repeat labs and Urine cultures on 3/6.  2. Follow up in Transplant Clinic on Monday 3/10 with Dr Adame.  3. Follow up in 4-6 weeks with Surgeon for removal of ureteral stent.

## 2025-03-04 NOTE — PROGRESS NOTE ADULT - NS ATTEND AMEND GEN_ALL_CORE FT
POD#7 s/p DDRT with DGF  d/c home on regular schedule of HD  Cont current immunosuppression with Envarsus by level for goal ~7-10, Steroid taper, Cellcept 500mg bid.   Transplant Prophylaxis with fluconazole (donor + culture), bactrim, valcyte  GI prophylaxis due to steroids  monitor glucose levels and adjust insulin as necessary   medication teaching  f/u in transplant office next week
POD#6 s/p DDRT with DGF  HD as per nephrology  Cont current immunosuppression with Envarsus by level for goal ~7-10, Steroid taper, Cellcept 500mg bid.   Transplant Prophylaxis with fluconazole, bactrim, valcyte  GI prophylaxis due to steroids  monitor glucose levels and adjust insulin as necessary   medication teaching

## 2025-03-06 ENCOUNTER — EMERGENCY (EMERGENCY)
Facility: HOSPITAL | Age: 70
LOS: 1 days | Discharge: HOME | End: 2025-03-06
Attending: EMERGENCY MEDICINE
Payer: COMMERCIAL

## 2025-03-06 VITALS
DIASTOLIC BLOOD PRESSURE: 81 MMHG | TEMPERATURE: 99 F | RESPIRATION RATE: 20 BRPM | HEART RATE: 68 BPM | SYSTOLIC BLOOD PRESSURE: 185 MMHG | OXYGEN SATURATION: 98 %

## 2025-03-06 VITALS
OXYGEN SATURATION: 98 % | HEART RATE: 70 BPM | SYSTOLIC BLOOD PRESSURE: 155 MMHG | TEMPERATURE: 99 F | WEIGHT: 207.23 LBS | HEIGHT: 69 IN | DIASTOLIC BLOOD PRESSURE: 71 MMHG | RESPIRATION RATE: 20 BRPM

## 2025-03-06 DIAGNOSIS — Z98.890 OTHER SPECIFIED POSTPROCEDURAL STATES: Chronic | ICD-10-CM

## 2025-03-06 LAB
ALBUMIN SERPL ELPH-MCNC: 3.7 G/DL — SIGNIFICANT CHANGE UP (ref 3.3–5)
ALP SERPL-CCNC: 80 U/L — SIGNIFICANT CHANGE UP (ref 40–120)
ALT FLD-CCNC: 6 U/L — LOW (ref 10–45)
ANION GAP SERPL CALC-SCNC: 18 MMOL/L — HIGH (ref 5–17)
APTT BLD: 24.3 SEC — LOW (ref 24.5–35.6)
AST SERPL-CCNC: 10 U/L — SIGNIFICANT CHANGE UP (ref 10–40)
BASOPHILS # BLD AUTO: 0 K/UL — SIGNIFICANT CHANGE UP (ref 0–0.2)
BASOPHILS NFR BLD AUTO: 0 % — SIGNIFICANT CHANGE UP (ref 0–2)
BILIRUB SERPL-MCNC: 0.6 MG/DL — SIGNIFICANT CHANGE UP (ref 0.2–1.2)
BLD GP AB SCN SERPL QL: NEGATIVE — SIGNIFICANT CHANGE UP
BUN SERPL-MCNC: 58 MG/DL — HIGH (ref 7–23)
CALCIUM SERPL-MCNC: 8.7 MG/DL — SIGNIFICANT CHANGE UP (ref 8.4–10.5)
CHLORIDE SERPL-SCNC: 92 MMOL/L — LOW (ref 96–108)
CO2 SERPL-SCNC: 27 MMOL/L — SIGNIFICANT CHANGE UP (ref 22–31)
CREAT SERPL-MCNC: 9.32 MG/DL — HIGH (ref 0.5–1.3)
EGFR: 6 ML/MIN/1.73M2 — LOW
EGFR: 6 ML/MIN/1.73M2 — LOW
EOSINOPHIL # BLD AUTO: 0.08 K/UL — SIGNIFICANT CHANGE UP (ref 0–0.5)
EOSINOPHIL NFR BLD AUTO: 2 % — SIGNIFICANT CHANGE UP (ref 0–6)
FLUAV AG NPH QL: SIGNIFICANT CHANGE UP
FLUBV AG NPH QL: SIGNIFICANT CHANGE UP
GAS PNL BLDV: SIGNIFICANT CHANGE UP
GLUCOSE SERPL-MCNC: 143 MG/DL — HIGH (ref 70–99)
HCT VFR BLD CALC: 27.2 % — LOW (ref 39–50)
HGB BLD-MCNC: 8.7 G/DL — LOW (ref 13–17)
IMM GRANULOCYTES NFR BLD AUTO: 1.5 % — HIGH (ref 0–0.9)
INR BLD: 0.96 RATIO — SIGNIFICANT CHANGE UP (ref 0.85–1.16)
LYMPHOCYTES # BLD AUTO: 0.08 K/UL — LOW (ref 1–3.3)
LYMPHOCYTES # BLD AUTO: 2 % — LOW (ref 13–44)
MCHC RBC-ENTMCNC: 29.8 PG — SIGNIFICANT CHANGE UP (ref 27–34)
MCHC RBC-ENTMCNC: 32 G/DL — SIGNIFICANT CHANGE UP (ref 32–36)
MCV RBC AUTO: 93.2 FL — SIGNIFICANT CHANGE UP (ref 80–100)
MONOCYTES # BLD AUTO: 0.17 K/UL — SIGNIFICANT CHANGE UP (ref 0–0.9)
MONOCYTES NFR BLD AUTO: 4.2 % — SIGNIFICANT CHANGE UP (ref 2–14)
NEUTROPHILS # BLD AUTO: 3.69 K/UL — SIGNIFICANT CHANGE UP (ref 1.8–7.4)
NEUTROPHILS NFR BLD AUTO: 90.3 % — HIGH (ref 43–77)
NRBC BLD AUTO-RTO: 0 /100 WBCS — SIGNIFICANT CHANGE UP (ref 0–0)
PLATELET # BLD AUTO: 127 K/UL — LOW (ref 150–400)
POTASSIUM SERPL-MCNC: 4 MMOL/L — SIGNIFICANT CHANGE UP (ref 3.5–5.3)
POTASSIUM SERPL-SCNC: 4 MMOL/L — SIGNIFICANT CHANGE UP (ref 3.5–5.3)
PROT SERPL-MCNC: 6.9 G/DL — SIGNIFICANT CHANGE UP (ref 6–8.3)
PROTHROM AB SERPL-ACNC: 11 SEC — SIGNIFICANT CHANGE UP (ref 9.9–13.4)
RBC # BLD: 2.92 M/UL — LOW (ref 4.2–5.8)
RBC # FLD: 13.2 % — SIGNIFICANT CHANGE UP (ref 10.3–14.5)
RH IG SCN BLD-IMP: POSITIVE — SIGNIFICANT CHANGE UP
RSV RNA NPH QL NAA+NON-PROBE: SIGNIFICANT CHANGE UP
SARS-COV-2 RNA SPEC QL NAA+PROBE: SIGNIFICANT CHANGE UP
SODIUM SERPL-SCNC: 137 MMOL/L — SIGNIFICANT CHANGE UP (ref 135–145)
WBC # BLD: 4.08 K/UL — SIGNIFICANT CHANGE UP (ref 3.8–10.5)
WBC # FLD AUTO: 4.08 K/UL — SIGNIFICANT CHANGE UP (ref 3.8–10.5)

## 2025-03-06 PROCEDURE — 87040 BLOOD CULTURE FOR BACTERIA: CPT

## 2025-03-06 PROCEDURE — 83605 ASSAY OF LACTIC ACID: CPT

## 2025-03-06 PROCEDURE — 85025 COMPLETE CBC W/AUTO DIFF WBC: CPT

## 2025-03-06 PROCEDURE — 99284 EMERGENCY DEPT VISIT MOD MDM: CPT | Mod: 25

## 2025-03-06 PROCEDURE — 74176 CT ABD & PELVIS W/O CONTRAST: CPT | Mod: 26

## 2025-03-06 PROCEDURE — 85018 HEMOGLOBIN: CPT

## 2025-03-06 PROCEDURE — 85610 PROTHROMBIN TIME: CPT

## 2025-03-06 PROCEDURE — 86850 RBC ANTIBODY SCREEN: CPT

## 2025-03-06 PROCEDURE — 96365 THER/PROPH/DIAG IV INF INIT: CPT

## 2025-03-06 PROCEDURE — 84132 ASSAY OF SERUM POTASSIUM: CPT

## 2025-03-06 PROCEDURE — 74176 CT ABD & PELVIS W/O CONTRAST: CPT | Mod: MC

## 2025-03-06 PROCEDURE — 87637 SARSCOV2&INF A&B&RSV AMP PRB: CPT

## 2025-03-06 PROCEDURE — 80053 COMPREHEN METABOLIC PANEL: CPT

## 2025-03-06 PROCEDURE — 82330 ASSAY OF CALCIUM: CPT

## 2025-03-06 PROCEDURE — 85014 HEMATOCRIT: CPT

## 2025-03-06 PROCEDURE — 99285 EMERGENCY DEPT VISIT HI MDM: CPT | Mod: 25

## 2025-03-06 PROCEDURE — 36410 VNPNXR 3YR/> PHY/QHP DX/THER: CPT

## 2025-03-06 PROCEDURE — 85730 THROMBOPLASTIN TIME PARTIAL: CPT

## 2025-03-06 PROCEDURE — 82803 BLOOD GASES ANY COMBINATION: CPT

## 2025-03-06 PROCEDURE — 84295 ASSAY OF SERUM SODIUM: CPT

## 2025-03-06 PROCEDURE — 71045 X-RAY EXAM CHEST 1 VIEW: CPT | Mod: 26

## 2025-03-06 PROCEDURE — 82435 ASSAY OF BLOOD CHLORIDE: CPT

## 2025-03-06 PROCEDURE — 86901 BLOOD TYPING SEROLOGIC RH(D): CPT

## 2025-03-06 PROCEDURE — 86900 BLOOD TYPING SEROLOGIC ABO: CPT

## 2025-03-06 PROCEDURE — 82947 ASSAY GLUCOSE BLOOD QUANT: CPT

## 2025-03-06 PROCEDURE — 71045 X-RAY EXAM CHEST 1 VIEW: CPT

## 2025-03-06 PROCEDURE — 96366 THER/PROPH/DIAG IV INF ADDON: CPT

## 2025-03-06 RX ORDER — GABAPENTIN 400 MG/1
300 CAPSULE ORAL ONCE
Refills: 0 | Status: COMPLETED | OUTPATIENT
Start: 2025-03-06 | End: 2025-03-06

## 2025-03-06 RX ORDER — ACETAMINOPHEN 500 MG/5ML
1000 LIQUID (ML) ORAL ONCE
Refills: 0 | Status: COMPLETED | OUTPATIENT
Start: 2025-03-06 | End: 2025-03-06

## 2025-03-06 RX ADMIN — Medication 1000 MILLIGRAM(S): at 13:34

## 2025-03-06 RX ADMIN — Medication 500 MILLILITER(S): at 09:41

## 2025-03-06 RX ADMIN — Medication 400 MILLIGRAM(S): at 09:41

## 2025-03-06 RX ADMIN — Medication 500 MILLILITER(S): at 13:34

## 2025-03-06 RX ADMIN — GABAPENTIN 300 MILLIGRAM(S): 400 CAPSULE ORAL at 13:28

## 2025-03-06 NOTE — CHART NOTE - NSCHARTNOTEFT_GEN_A_CORE
Transplant Surgery - Consult Note    Present:   Patient seen and examined with Dr Mata, attending Transplant surgeon.  HPI: Patient with recent h/o  donor renal transplant presents with leakage at drain site and decreased drain output.  No fevers or other complaints.       Education:  Medications    Plan of care:  See Below      PAST MEDICAL & SURGICAL HISTORY:  HTN (hypertension)      Type 2 diabetes mellitus      ESRD on dialysis  mwf      Dyslipidemia      S/P arteriovenous (AV) fistula creation          Vital Signs Last 24 Hrs  T(C): 37.1 (06 Mar 2025 13:30), Max: 37.3 (06 Mar 2025 08:42)  T(F): 98.7 (06 Mar 2025 13:30), Max: 99.2 (06 Mar 2025 08:42)  HR: 68 (06 Mar 2025 13:30) (65 - 70)  BP: 185/81 (06 Mar 2025 13:30) (155/71 - 185/81)  BP(mean): 110 (06 Mar 2025 13:30) (110 - 110)  RR: 20 (06 Mar 2025 13:30) (18 - 20)  SpO2: 98% (06 Mar 2025 13:30) (94% - 98%)        I&O's Summary                            8.7    4.08  )-----------( 127      ( 06 Mar 2025 09:48 )             27.2     03-06    137  |  92[L]  |  58[H]  ----------------------------<  143[H]  4.0   |  27  |  9.32[H]    Ca    8.7      06 Mar 2025 09:48    TPro  6.9  /  Alb  3.7  /  TBili  0.6  /  DBili  x   /  AST  10  /  ALT  6[L]  /  AlkPhos  80  03-06        Review of systems  Gen: No weight changes, fatigue, fevers/chills, weakness  Skin: No rashes  Head/Eyes/Ears/Mouth: No headache; Normal hearing; Normal vision w/o blurriness; No sinus pain/discomfort, sore throat  Respiratory: No dyspnea, cough, wheezing, hemoptysis  CV: No chest pain, PND, orthopnea  GI: C/o no abdominal pain, no diarrhea, constipation, nausea, vomiting, melena, hematochezia  : No increased frequency, dysuria, hematuria, nocturia  MSK: No joint pain/swelling; no back pain; no edema  Neuro: No dizziness/lightheadedness, weakness, seizures, numbness, tingling  Heme: No easy bruising or bleeding  Endo: No heat/cold intolerance  Psych: No significant nervousness, anxiety, stress, depression  All other systems were reviewed and are negative, except as noted.      PHYSICAL EXAM:  Constitutional: Well developed / well nourished  Eyes: Anicteric, PERRLA  ENMT: nc/at, no thrush  Neck: supple,   Respiratory: CTA B/L  Cardiovascular: RRR  Abdomen: RLQW incision clean and healing well, both drains serous with low output  Gastrointestinal: Soft abdomen, nondistended, nontender  Genitourinary: Voiding spontaneously  Extremities: SCD's in place and working bilaterally  Vascular: Palpable dp pulses bilaterally.   Neurological: A&O x3  Skin: no rashes, ulcerations, lesions  Musculoskeletal: Moving all extremities  Psychiatric: Responsive    Assessment/Plan:    Patient is here with leakage from drain site and low drain output. In view of absence of other symptoms and normal labs as well as imaging with no abnormal findings except small subcutaneous fluid collection under the incision, the subcutaneous drain was removed and the patient may be discharged with follow up in office and he should continue outpatient dialysis. Transplant Surgery - Consult Note    Present:   Patient seen and examined with Dr Mata, attending Transplant surgeon.  HPI: Patient with recent h/o  donor renal transplant presents with leakage at drain site and decreased drain output.  No fevers or other complaints.       Education:  Medications    Plan of care:  See Below      PAST MEDICAL & SURGICAL HISTORY:  HTN (hypertension)      Type 2 diabetes mellitus      ESRD on dialysis  mwf      Dyslipidemia      S/P arteriovenous (AV) fistula creation          Vital Signs Last 24 Hrs  T(C): 37.1 (06 Mar 2025 13:30), Max: 37.3 (06 Mar 2025 08:42)  T(F): 98.7 (06 Mar 2025 13:30), Max: 99.2 (06 Mar 2025 08:42)  HR: 68 (06 Mar 2025 13:30) (65 - 70)  BP: 185/81 (06 Mar 2025 13:30) (155/71 - 185/81)  BP(mean): 110 (06 Mar 2025 13:30) (110 - 110)  RR: 20 (06 Mar 2025 13:30) (18 - 20)  SpO2: 98% (06 Mar 2025 13:30) (94% - 98%)        I&O's Summary                            8.7    4.08  )-----------( 127      ( 06 Mar 2025 09:48 )             27.2     03-06    137  |  92[L]  |  58[H]  ----------------------------<  143[H]  4.0   |  27  |  9.32[H]    Ca    8.7      06 Mar 2025 09:48    TPro  6.9  /  Alb  3.7  /  TBili  0.6  /  DBili  x   /  AST  10  /  ALT  6[L]  /  AlkPhos  80  03-06        Review of systems  Gen: No weight changes, fatigue, fevers/chills, weakness  Skin: No rashes  Head/Eyes/Ears/Mouth: No headache; Normal hearing; Normal vision w/o blurriness; No sinus pain/discomfort, sore throat  Respiratory: No dyspnea, cough, wheezing, hemoptysis  CV: No chest pain, PND, orthopnea  GI: C/o no abdominal pain, no diarrhea, constipation, nausea, vomiting, melena, hematochezia  : No increased frequency, dysuria, hematuria, nocturia  MSK: No joint pain/swelling; no back pain; no edema  Neuro: No dizziness/lightheadedness, weakness, seizures, numbness, tingling  Heme: No easy bruising or bleeding  Endo: No heat/cold intolerance  Psych: No significant nervousness, anxiety, stress, depression  All other systems were reviewed and are negative, except as noted.      PHYSICAL EXAM:  Constitutional: Well developed / well nourished  Eyes: Anicteric, PERRLA  ENMT: nc/at, no thrush  Neck: supple,   Respiratory: CTA B/L  Cardiovascular: RRR  Abdomen: RLQW incision clean and healing well, both drains serous with low output  Gastrointestinal: Soft abdomen, nondistended, nontender  Genitourinary: Voiding spontaneously  Extremities: SCD's in place and working bilaterally  Vascular: Palpable dp pulses bilaterally.   Neurological: A&O x3  Skin: no rashes, ulcerations, lesions  Musculoskeletal: Moving all extremities  Psychiatric: Responsive    Assessment/Plan:    Patient is here with leakage from drain site and low drain output. In view of absence of other symptoms and normal labs as well as imaging with no abnormal findings except small subcutaneous fluid collection under the incision, the subcutaneous drain was removed and the patient may be discharged with follow up in office and he should continue outpatient dialysis.    f/u in transplant office Monday or Tuesday

## 2025-03-11 ENCOUNTER — TRANSCRIPTION ENCOUNTER (OUTPATIENT)
Age: 70
End: 2025-03-11

## 2025-03-11 ENCOUNTER — LABORATORY RESULT (OUTPATIENT)
Age: 70
End: 2025-03-11

## 2025-03-11 ENCOUNTER — APPOINTMENT (OUTPATIENT)
Dept: NEPHROLOGY | Facility: CLINIC | Age: 70
End: 2025-03-11
Payer: MEDICARE

## 2025-03-11 VITALS
DIASTOLIC BLOOD PRESSURE: 56 MMHG | BODY MASS INDEX: 28.14 KG/M2 | WEIGHT: 190 LBS | HEIGHT: 69 IN | SYSTOLIC BLOOD PRESSURE: 146 MMHG | HEART RATE: 67 BPM | RESPIRATION RATE: 17 BRPM | TEMPERATURE: 98.7 F | OXYGEN SATURATION: 95 %

## 2025-03-11 DIAGNOSIS — Z79.899 OTHER LONG TERM (CURRENT) DRUG THERAPY: ICD-10-CM

## 2025-03-11 DIAGNOSIS — Z94.0 OTHER LONG TERM (CURRENT) DRUG THERAPY: ICD-10-CM

## 2025-03-11 DIAGNOSIS — I10 ESSENTIAL (PRIMARY) HYPERTENSION: ICD-10-CM

## 2025-03-11 DIAGNOSIS — E11.9 TYPE 2 DIABETES MELLITUS W/OUT COMPLICATIONS: ICD-10-CM

## 2025-03-11 LAB
ALBUMIN SERPL ELPH-MCNC: 3.8 G/DL
ALP BLD-CCNC: 82 U/L
ALT SERPL-CCNC: <5 U/L
ANION GAP SERPL CALC-SCNC: 15 MMOL/L
AST SERPL-CCNC: 7 U/L
BILIRUB SERPL-MCNC: 0.4 MG/DL
BUN SERPL-MCNC: 48 MG/DL
CALCIUM SERPL-MCNC: 9 MG/DL
CHLORIDE SERPL-SCNC: 99 MMOL/L
CO2 SERPL-SCNC: 27 MMOL/L
CREAT SERPL-MCNC: 8.11 MG/DL
CULTURE RESULTS: SIGNIFICANT CHANGE UP
CULTURE RESULTS: SIGNIFICANT CHANGE UP
EGFRCR SERPLBLD CKD-EPI 2021: 7 ML/MIN/1.73M2
GLUCOSE SERPL-MCNC: 275 MG/DL
MAGNESIUM SERPL-MCNC: 2 MG/DL
PHOSPHATE SERPL-MCNC: 5.7 MG/DL
POTASSIUM SERPL-SCNC: 4.4 MMOL/L
PROT SERPL-MCNC: 6.4 G/DL
SODIUM SERPL-SCNC: 141 MMOL/L
SPECIMEN SOURCE: SIGNIFICANT CHANGE UP
SPECIMEN SOURCE: SIGNIFICANT CHANGE UP
TACROLIMUS SERPL-MCNC: 3.5 NG/ML

## 2025-03-11 PROCEDURE — 99215 OFFICE O/P EST HI 40 MIN: CPT

## 2025-03-11 RX ORDER — CARVEDILOL 6.25 MG/1
6.25 TABLET, FILM COATED ORAL
Qty: 120 | Refills: 0 | Status: ACTIVE | COMMUNITY
Start: 2025-03-11 | End: 1900-01-01

## 2025-03-11 RX ORDER — TAMSULOSIN HYDROCHLORIDE 0.4 MG/1
0.4 CAPSULE ORAL
Qty: 30 | Refills: 10 | Status: ACTIVE | COMMUNITY
Start: 2025-03-11 | End: 1900-01-01

## 2025-03-11 RX ORDER — TACROLIMUS 1 MG/1
1 TABLET, EXTENDED RELEASE ORAL DAILY
Qty: 60 | Refills: 5 | Status: ACTIVE | COMMUNITY
Start: 2025-03-11 | End: 1900-01-01

## 2025-03-12 ENCOUNTER — INPATIENT (INPATIENT)
Facility: HOSPITAL | Age: 70
LOS: 9 days | Discharge: HOME HEALTH | DRG: 641 | End: 2025-03-22
Attending: TRANSPLANT SURGERY | Admitting: TRANSPLANT SURGERY
Payer: MEDICARE

## 2025-03-12 VITALS
DIASTOLIC BLOOD PRESSURE: 72 MMHG | OXYGEN SATURATION: 98 % | HEART RATE: 71 BPM | SYSTOLIC BLOOD PRESSURE: 147 MMHG | TEMPERATURE: 99 F | RESPIRATION RATE: 20 BRPM | HEIGHT: 69 IN | WEIGHT: 184.97 LBS

## 2025-03-12 DIAGNOSIS — Z98.890 OTHER SPECIFIED POSTPROCEDURAL STATES: Chronic | ICD-10-CM

## 2025-03-12 DIAGNOSIS — R73.9 HYPERGLYCEMIA, UNSPECIFIED: ICD-10-CM

## 2025-03-12 LAB
ALBUMIN SERPL ELPH-MCNC: 3.4 G/DL — SIGNIFICANT CHANGE UP (ref 3.3–5)
ALP SERPL-CCNC: 85 U/L — SIGNIFICANT CHANGE UP (ref 40–120)
ALT FLD-CCNC: 5 U/L — LOW (ref 10–45)
ANION GAP SERPL CALC-SCNC: 14 MMOL/L — SIGNIFICANT CHANGE UP (ref 5–17)
APPEARANCE: CLEAR
AST SERPL-CCNC: 12 U/L — SIGNIFICANT CHANGE UP (ref 10–40)
B-OH-BUTYR SERPL-SCNC: 0 MMOL/L — SIGNIFICANT CHANGE UP
BACTERIA: NEGATIVE /HPF
BASOPHILS # BLD AUTO: 0.04 K/UL — SIGNIFICANT CHANGE UP (ref 0–0.2)
BASOPHILS # BLD AUTO: 0.05 K/UL
BASOPHILS NFR BLD AUTO: 0.9 %
BASOPHILS NFR BLD AUTO: 0.9 % — SIGNIFICANT CHANGE UP (ref 0–2)
BILIRUB SERPL-MCNC: 0.4 MG/DL — SIGNIFICANT CHANGE UP (ref 0.2–1.2)
BILIRUBIN URINE: NEGATIVE
BLOOD URINE: ABNORMAL
BUN SERPL-MCNC: 30 MG/DL — HIGH (ref 7–23)
CALCIUM SERPL-MCNC: 8.6 MG/DL — SIGNIFICANT CHANGE UP (ref 8.4–10.5)
CAST: 0 /LPF
CHLORIDE SERPL-SCNC: 95 MMOL/L — LOW (ref 96–108)
CO2 SERPL-SCNC: 24 MMOL/L — SIGNIFICANT CHANGE UP (ref 22–31)
COLOR: YELLOW
CREAT SERPL-MCNC: 5.43 MG/DL — HIGH (ref 0.5–1.3)
CREAT SPEC-SCNC: 191 MG/DL
CREAT/PROT UR: 0.5 RATIO
EGFR: 11 ML/MIN/1.73M2 — LOW
EGFR: 11 ML/MIN/1.73M2 — LOW
EOSINOPHIL # BLD AUTO: 0.04 K/UL — SIGNIFICANT CHANGE UP (ref 0–0.5)
EOSINOPHIL # BLD AUTO: 0.05 K/UL
EOSINOPHIL NFR BLD AUTO: 0.9 %
EOSINOPHIL NFR BLD AUTO: 0.9 % — SIGNIFICANT CHANGE UP (ref 0–6)
EPITHELIAL CELLS: 2 /HPF
FLUAV AG NPH QL: SIGNIFICANT CHANGE UP
FLUBV AG NPH QL: SIGNIFICANT CHANGE UP
GAS PNL BLDV: SIGNIFICANT CHANGE UP
GLUCOSE QUALITATIVE U: 500 MG/DL
GLUCOSE SERPL-MCNC: 398 MG/DL — HIGH (ref 70–99)
HCT VFR BLD CALC: 25.8 % — LOW (ref 39–50)
HCT VFR BLD CALC: 26.2 %
HGB BLD-MCNC: 8.2 G/DL
HGB BLD-MCNC: 8.5 G/DL — LOW (ref 13–17)
KETONES URINE: NEGATIVE MG/DL
LEUKOCYTE ESTERASE URINE: NEGATIVE
LYMPHOCYTES # BLD AUTO: 0.13 K/UL
LYMPHOCYTES # BLD AUTO: 0.13 K/UL — LOW (ref 1–3.3)
LYMPHOCYTES # BLD AUTO: 2.7 % — LOW (ref 13–44)
LYMPHOCYTES NFR BLD AUTO: 2.6 %
MAN DIFF?: NORMAL
MANUAL SMEAR VERIFICATION: SIGNIFICANT CHANGE UP
MCHC RBC-ENTMCNC: 30 PG
MCHC RBC-ENTMCNC: 31 PG — SIGNIFICANT CHANGE UP (ref 27–34)
MCHC RBC-ENTMCNC: 31.3 G/DL
MCHC RBC-ENTMCNC: 32.9 G/DL — SIGNIFICANT CHANGE UP (ref 32–36)
MCV RBC AUTO: 94.2 FL — SIGNIFICANT CHANGE UP (ref 80–100)
MCV RBC AUTO: 96 FL
MICROSCOPIC-UA: NORMAL
MONOCYTES # BLD AUTO: 0.08 K/UL — SIGNIFICANT CHANGE UP (ref 0–0.9)
MONOCYTES # BLD AUTO: 0.13 K/UL
MONOCYTES NFR BLD AUTO: 1.8 % — LOW (ref 2–14)
MONOCYTES NFR BLD AUTO: 2.6 %
NEUTROPHILS # BLD AUTO: 4.35 K/UL — SIGNIFICANT CHANGE UP (ref 1.8–7.4)
NEUTROPHILS # BLD AUTO: 4.71 K/UL
NEUTROPHILS NFR BLD AUTO: 93 %
NEUTROPHILS NFR BLD AUTO: 93.7 % — HIGH (ref 43–77)
NITRITE URINE: NEGATIVE
NT-PROBNP SERPL-SCNC: 1333 PG/ML — HIGH (ref 0–300)
PH URINE: 6
PLAT MORPH BLD: NORMAL — SIGNIFICANT CHANGE UP
PLATELET # BLD AUTO: 162 K/UL
PLATELET # BLD AUTO: SIGNIFICANT CHANGE UP K/UL (ref 150–400)
POTASSIUM SERPL-MCNC: 4.7 MMOL/L — SIGNIFICANT CHANGE UP (ref 3.5–5.3)
POTASSIUM SERPL-SCNC: 4.7 MMOL/L — SIGNIFICANT CHANGE UP (ref 3.5–5.3)
PROT SERPL-MCNC: 6.9 G/DL — SIGNIFICANT CHANGE UP (ref 6–8.3)
PROT UR-MCNC: 94 MG/DL
PROTEIN URINE: 100 MG/DL
RBC # BLD: 2.73 M/UL
RBC # BLD: 2.74 M/UL — LOW (ref 4.2–5.8)
RBC # FLD: 12.9 % — SIGNIFICANT CHANGE UP (ref 10.3–14.5)
RBC # FLD: 13.1 %
RBC BLD AUTO: SIGNIFICANT CHANGE UP
RED BLOOD CELLS URINE: 222 /HPF
RSV RNA NPH QL NAA+NON-PROBE: SIGNIFICANT CHANGE UP
SARS-COV-2 RNA SPEC QL NAA+PROBE: SIGNIFICANT CHANGE UP
SODIUM SERPL-SCNC: 133 MMOL/L — LOW (ref 135–145)
SPECIFIC GRAVITY URINE: 1.02
UROBILINOGEN URINE: 0.2 MG/DL
WBC # BLD: 4.64 K/UL — SIGNIFICANT CHANGE UP (ref 3.8–10.5)
WBC # FLD AUTO: 4.64 K/UL — SIGNIFICANT CHANGE UP (ref 3.8–10.5)
WBC # FLD AUTO: 5.06 K/UL
WHITE BLOOD CELLS URINE: 5 /HPF

## 2025-03-12 PROCEDURE — 99285 EMERGENCY DEPT VISIT HI MDM: CPT | Mod: GC

## 2025-03-12 PROCEDURE — 99232 SBSQ HOSP IP/OBS MODERATE 35: CPT | Mod: FS,GC,24

## 2025-03-12 PROCEDURE — 71045 X-RAY EXAM CHEST 1 VIEW: CPT | Mod: 26

## 2025-03-12 RX ADMIN — Medication 500 MILLILITER(S): at 19:05

## 2025-03-13 DIAGNOSIS — I10 ESSENTIAL (PRIMARY) HYPERTENSION: ICD-10-CM

## 2025-03-13 DIAGNOSIS — E11.65 TYPE 2 DIABETES MELLITUS WITH HYPERGLYCEMIA: ICD-10-CM

## 2025-03-13 DIAGNOSIS — R73.9 HYPERGLYCEMIA, UNSPECIFIED: ICD-10-CM

## 2025-03-13 LAB
A1C WITH ESTIMATED AVERAGE GLUCOSE RESULT: 6.9 % — HIGH (ref 4–5.6)
ALBUMIN SERPL ELPH-MCNC: 3.3 G/DL — SIGNIFICANT CHANGE UP (ref 3.3–5)
ALP SERPL-CCNC: 79 U/L — SIGNIFICANT CHANGE UP (ref 40–120)
ALT FLD-CCNC: <5 U/L — LOW (ref 10–45)
ANION GAP SERPL CALC-SCNC: 16 MMOL/L — SIGNIFICANT CHANGE UP (ref 5–17)
AST SERPL-CCNC: 7 U/L — LOW (ref 10–40)
BASOPHILS # BLD AUTO: 0.03 K/UL — SIGNIFICANT CHANGE UP (ref 0–0.2)
BASOPHILS NFR BLD AUTO: 0.7 % — SIGNIFICANT CHANGE UP (ref 0–2)
BILIRUB SERPL-MCNC: 0.6 MG/DL — SIGNIFICANT CHANGE UP (ref 0.2–1.2)
BLD GP AB SCN SERPL QL: NEGATIVE — SIGNIFICANT CHANGE UP
BUN SERPL-MCNC: 35 MG/DL — HIGH (ref 7–23)
CALCIUM SERPL-MCNC: 8.8 MG/DL — SIGNIFICANT CHANGE UP (ref 8.4–10.5)
CHLORIDE SERPL-SCNC: 98 MMOL/L — SIGNIFICANT CHANGE UP (ref 96–108)
CO2 SERPL-SCNC: 26 MMOL/L — SIGNIFICANT CHANGE UP (ref 22–31)
CREAT SERPL-MCNC: 6.81 MG/DL — HIGH (ref 0.5–1.3)
EGFR: 8 ML/MIN/1.73M2 — LOW
EGFR: 8 ML/MIN/1.73M2 — LOW
EOSINOPHIL # BLD AUTO: 0.15 K/UL — SIGNIFICANT CHANGE UP (ref 0–0.5)
EOSINOPHIL NFR BLD AUTO: 3.5 % — SIGNIFICANT CHANGE UP (ref 0–6)
ESTIMATED AVERAGE GLUCOSE: 151 MG/DL — HIGH (ref 68–114)
GLUCOSE BLDC GLUCOMTR-MCNC: 152 MG/DL — HIGH (ref 70–99)
GLUCOSE BLDC GLUCOMTR-MCNC: 171 MG/DL — HIGH (ref 70–99)
GLUCOSE BLDC GLUCOMTR-MCNC: 190 MG/DL — HIGH (ref 70–99)
GLUCOSE BLDC GLUCOMTR-MCNC: 246 MG/DL — HIGH (ref 70–99)
GLUCOSE BLDC GLUCOMTR-MCNC: 246 MG/DL — HIGH (ref 70–99)
GLUCOSE BLDC GLUCOMTR-MCNC: 287 MG/DL — HIGH (ref 70–99)
GLUCOSE SERPL-MCNC: 154 MG/DL — HIGH (ref 70–99)
HBV SURFACE AG SER-ACNC: SIGNIFICANT CHANGE UP
HCT VFR BLD CALC: 22.8 % — LOW (ref 39–50)
HGB BLD-MCNC: 7.4 G/DL — LOW (ref 13–17)
IMM GRANULOCYTES NFR BLD AUTO: 0.5 % — SIGNIFICANT CHANGE UP (ref 0–0.9)
LYMPHOCYTES # BLD AUTO: 0.12 K/UL — LOW (ref 1–3.3)
LYMPHOCYTES # BLD AUTO: 2.8 % — LOW (ref 13–44)
MAGNESIUM SERPL-MCNC: 1.9 MG/DL — SIGNIFICANT CHANGE UP (ref 1.6–2.6)
MCHC RBC-ENTMCNC: 30.6 PG — SIGNIFICANT CHANGE UP (ref 27–34)
MCHC RBC-ENTMCNC: 32.5 G/DL — SIGNIFICANT CHANGE UP (ref 32–36)
MCV RBC AUTO: 94.2 FL — SIGNIFICANT CHANGE UP (ref 80–100)
MONOCYTES # BLD AUTO: 0.18 K/UL — SIGNIFICANT CHANGE UP (ref 0–0.9)
MONOCYTES NFR BLD AUTO: 4.2 % — SIGNIFICANT CHANGE UP (ref 2–14)
NEUTROPHILS # BLD AUTO: 3.78 K/UL — SIGNIFICANT CHANGE UP (ref 1.8–7.4)
NEUTROPHILS NFR BLD AUTO: 88.3 % — HIGH (ref 43–77)
NRBC BLD AUTO-RTO: 0 /100 WBCS — SIGNIFICANT CHANGE UP (ref 0–0)
PHOSPHATE SERPL-MCNC: 4 MG/DL — SIGNIFICANT CHANGE UP (ref 2.5–4.5)
PLATELET # BLD AUTO: 132 K/UL — LOW (ref 150–400)
POTASSIUM SERPL-MCNC: 3.7 MMOL/L — SIGNIFICANT CHANGE UP (ref 3.5–5.3)
POTASSIUM SERPL-SCNC: 3.7 MMOL/L — SIGNIFICANT CHANGE UP (ref 3.5–5.3)
PROT SERPL-MCNC: 6 G/DL — SIGNIFICANT CHANGE UP (ref 6–8.3)
RBC # BLD: 2.42 M/UL — LOW (ref 4.2–5.8)
RBC # FLD: 12.8 % — SIGNIFICANT CHANGE UP (ref 10.3–14.5)
RH IG SCN BLD-IMP: POSITIVE — SIGNIFICANT CHANGE UP
SODIUM SERPL-SCNC: 140 MMOL/L — SIGNIFICANT CHANGE UP (ref 135–145)
TACROLIMUS SERPL-MCNC: 6.3 NG/ML — SIGNIFICANT CHANGE UP
TACROLIMUS SERPL-MCNC: 8.9 NG/ML — SIGNIFICANT CHANGE UP
WBC # BLD: 4.28 K/UL — SIGNIFICANT CHANGE UP (ref 3.8–10.5)
WBC # FLD AUTO: 4.28 K/UL — SIGNIFICANT CHANGE UP (ref 3.8–10.5)

## 2025-03-13 PROCEDURE — 99232 SBSQ HOSP IP/OBS MODERATE 35: CPT | Mod: FS,GC,24

## 2025-03-13 PROCEDURE — 99222 1ST HOSP IP/OBS MODERATE 55: CPT

## 2025-03-13 PROCEDURE — 99222 1ST HOSP IP/OBS MODERATE 55: CPT | Mod: GC

## 2025-03-13 RX ORDER — SODIUM CHLORIDE 9 G/1000ML
1000 INJECTION, SOLUTION INTRAVENOUS
Refills: 0 | Status: DISCONTINUED | OUTPATIENT
Start: 2025-03-13 | End: 2025-03-22

## 2025-03-13 RX ORDER — TAMSULOSIN HYDROCHLORIDE 0.4 MG/1
0.4 CAPSULE ORAL AT BEDTIME
Refills: 0 | Status: DISCONTINUED | OUTPATIENT
Start: 2025-03-13 | End: 2025-03-22

## 2025-03-13 RX ORDER — TACROLIMUS 0.5 MG/1
8 CAPSULE ORAL
Refills: 0 | Status: DISCONTINUED | OUTPATIENT
Start: 2025-03-13 | End: 2025-03-14

## 2025-03-13 RX ORDER — TRAMADOL HYDROCHLORIDE 50 MG/1
25 TABLET, FILM COATED ORAL EVERY 12 HOURS
Refills: 0 | Status: DISCONTINUED | OUTPATIENT
Start: 2025-03-13 | End: 2025-03-19

## 2025-03-13 RX ORDER — CARVEDILOL 3.12 MG/1
12.5 TABLET, FILM COATED ORAL EVERY 12 HOURS
Refills: 0 | Status: DISCONTINUED | OUTPATIENT
Start: 2025-03-13 | End: 2025-03-22

## 2025-03-13 RX ORDER — DEXTROSE 50 % IN WATER 50 %
12.5 SYRINGE (ML) INTRAVENOUS ONCE
Refills: 0 | Status: DISCONTINUED | OUTPATIENT
Start: 2025-03-13 | End: 2025-03-22

## 2025-03-13 RX ORDER — INSULIN LISPRO 100 U/ML
INJECTION, SOLUTION INTRAVENOUS; SUBCUTANEOUS
Refills: 0 | Status: DISCONTINUED | OUTPATIENT
Start: 2025-03-13 | End: 2025-03-13

## 2025-03-13 RX ORDER — INSULIN LISPRO 100 U/ML
4 INJECTION, SOLUTION INTRAVENOUS; SUBCUTANEOUS ONCE
Refills: 0 | Status: COMPLETED | OUTPATIENT
Start: 2025-03-13 | End: 2025-03-13

## 2025-03-13 RX ORDER — PREDNISONE 20 MG/1
5 TABLET ORAL DAILY
Refills: 0 | Status: DISCONTINUED | OUTPATIENT
Start: 2025-03-13 | End: 2025-03-17

## 2025-03-13 RX ORDER — INSULIN LISPRO 100 U/ML
INJECTION, SOLUTION INTRAVENOUS; SUBCUTANEOUS AT BEDTIME
Refills: 0 | Status: DISCONTINUED | OUTPATIENT
Start: 2025-03-13 | End: 2025-03-22

## 2025-03-13 RX ORDER — INSULIN GLARGINE-YFGN 100 [IU]/ML
8 INJECTION, SOLUTION SUBCUTANEOUS AT BEDTIME
Refills: 0 | Status: DISCONTINUED | OUTPATIENT
Start: 2025-03-13 | End: 2025-03-14

## 2025-03-13 RX ORDER — INSULIN LISPRO 100 U/ML
INJECTION, SOLUTION INTRAVENOUS; SUBCUTANEOUS AT BEDTIME
Refills: 0 | Status: DISCONTINUED | OUTPATIENT
Start: 2025-03-13 | End: 2025-03-13

## 2025-03-13 RX ORDER — VALGANCICLOVIR 450 MG/1
450 TABLET, FILM COATED ORAL
Refills: 0 | Status: DISCONTINUED | OUTPATIENT
Start: 2025-03-13 | End: 2025-03-22

## 2025-03-13 RX ORDER — GLUCAGON 3 MG/1
1 POWDER NASAL ONCE
Refills: 0 | Status: DISCONTINUED | OUTPATIENT
Start: 2025-03-13 | End: 2025-03-22

## 2025-03-13 RX ORDER — ACETAMINOPHEN 500 MG/5ML
650 LIQUID (ML) ORAL EVERY 6 HOURS
Refills: 0 | Status: DISCONTINUED | OUTPATIENT
Start: 2025-03-13 | End: 2025-03-22

## 2025-03-13 RX ORDER — SULFAMETHOXAZOLE/TRIMETHOPRIM 800-160 MG
1 TABLET ORAL
Refills: 0 | Status: DISCONTINUED | OUTPATIENT
Start: 2025-03-13 | End: 2025-03-22

## 2025-03-13 RX ORDER — DEXTROSE 50 % IN WATER 50 %
25 SYRINGE (ML) INTRAVENOUS ONCE
Refills: 0 | Status: DISCONTINUED | OUTPATIENT
Start: 2025-03-13 | End: 2025-03-22

## 2025-03-13 RX ORDER — DEXTROSE 50 % IN WATER 50 %
15 SYRINGE (ML) INTRAVENOUS ONCE
Refills: 0 | Status: DISCONTINUED | OUTPATIENT
Start: 2025-03-13 | End: 2025-03-22

## 2025-03-13 RX ORDER — INSULIN LISPRO 100 U/ML
INJECTION, SOLUTION INTRAVENOUS; SUBCUTANEOUS
Refills: 0 | Status: DISCONTINUED | OUTPATIENT
Start: 2025-03-13 | End: 2025-03-19

## 2025-03-13 RX ORDER — TRAMADOL HYDROCHLORIDE 50 MG/1
25 TABLET, FILM COATED ORAL EVERY 6 HOURS
Refills: 0 | Status: DISCONTINUED | OUTPATIENT
Start: 2025-03-13 | End: 2025-03-13

## 2025-03-13 RX ORDER — INSULIN LISPRO 100 U/ML
3 INJECTION, SOLUTION INTRAVENOUS; SUBCUTANEOUS
Refills: 0 | Status: DISCONTINUED | OUTPATIENT
Start: 2025-03-13 | End: 2025-03-17

## 2025-03-13 RX ORDER — MYCOPHENOLATE MOFETIL 500 MG/1
500 TABLET, FILM COATED ORAL
Refills: 0 | Status: DISCONTINUED | OUTPATIENT
Start: 2025-03-13 | End: 2025-03-22

## 2025-03-13 RX ORDER — GABAPENTIN 400 MG/1
300 CAPSULE ORAL AT BEDTIME
Refills: 0 | Status: DISCONTINUED | OUTPATIENT
Start: 2025-03-14 | End: 2025-03-22

## 2025-03-13 RX ADMIN — MYCOPHENOLATE MOFETIL 500 MILLIGRAM(S): 500 TABLET, FILM COATED ORAL at 20:35

## 2025-03-13 RX ADMIN — INSULIN LISPRO 1: 100 INJECTION, SOLUTION INTRAVENOUS; SUBCUTANEOUS at 16:58

## 2025-03-13 RX ADMIN — CARVEDILOL 12.5 MILLIGRAM(S): 3.12 TABLET, FILM COATED ORAL at 17:00

## 2025-03-13 RX ADMIN — INSULIN LISPRO 3 UNIT(S): 100 INJECTION, SOLUTION INTRAVENOUS; SUBCUTANEOUS at 08:27

## 2025-03-13 RX ADMIN — TACROLIMUS 8 MILLIGRAM(S): 0.5 CAPSULE ORAL at 08:15

## 2025-03-13 RX ADMIN — Medication 650 MILLIGRAM(S): at 04:12

## 2025-03-13 RX ADMIN — PREDNISONE 5 MILLIGRAM(S): 20 TABLET ORAL at 05:28

## 2025-03-13 RX ADMIN — TRAMADOL HYDROCHLORIDE 25 MILLIGRAM(S): 50 TABLET, FILM COATED ORAL at 16:57

## 2025-03-13 RX ADMIN — Medication 1 APPLICATION(S): at 22:12

## 2025-03-13 RX ADMIN — INSULIN LISPRO 3 UNIT(S): 100 INJECTION, SOLUTION INTRAVENOUS; SUBCUTANEOUS at 12:29

## 2025-03-13 RX ADMIN — INSULIN LISPRO 4 UNIT(S): 100 INJECTION, SOLUTION INTRAVENOUS; SUBCUTANEOUS at 03:35

## 2025-03-13 RX ADMIN — INSULIN LISPRO 1: 100 INJECTION, SOLUTION INTRAVENOUS; SUBCUTANEOUS at 08:28

## 2025-03-13 RX ADMIN — Medication 650 MILLIGRAM(S): at 04:42

## 2025-03-13 RX ADMIN — CARVEDILOL 12.5 MILLIGRAM(S): 3.12 TABLET, FILM COATED ORAL at 08:10

## 2025-03-13 RX ADMIN — INSULIN LISPRO 3 UNIT(S): 100 INJECTION, SOLUTION INTRAVENOUS; SUBCUTANEOUS at 16:58

## 2025-03-13 RX ADMIN — TAMSULOSIN HYDROCHLORIDE 0.4 MILLIGRAM(S): 0.4 CAPSULE ORAL at 22:10

## 2025-03-13 RX ADMIN — INSULIN GLARGINE-YFGN 8 UNIT(S): 100 INJECTION, SOLUTION SUBCUTANEOUS at 22:10

## 2025-03-13 RX ADMIN — Medication 20 MILLIGRAM(S): at 11:58

## 2025-03-13 RX ADMIN — MYCOPHENOLATE MOFETIL 500 MILLIGRAM(S): 500 TABLET, FILM COATED ORAL at 08:16

## 2025-03-13 RX ADMIN — Medication 650 MILLIGRAM(S): at 12:28

## 2025-03-13 RX ADMIN — INSULIN LISPRO 1: 100 INJECTION, SOLUTION INTRAVENOUS; SUBCUTANEOUS at 12:29

## 2025-03-13 RX ADMIN — Medication 650 MILLIGRAM(S): at 11:58

## 2025-03-14 ENCOUNTER — TRANSCRIPTION ENCOUNTER (OUTPATIENT)
Age: 70
End: 2025-03-14

## 2025-03-14 LAB
ALBUMIN SERPL ELPH-MCNC: 3.3 G/DL — SIGNIFICANT CHANGE UP (ref 3.3–5)
ALP SERPL-CCNC: 79 U/L — SIGNIFICANT CHANGE UP (ref 40–120)
ALT FLD-CCNC: <5 U/L — LOW (ref 10–45)
ANION GAP SERPL CALC-SCNC: 15 MMOL/L — SIGNIFICANT CHANGE UP (ref 5–17)
AST SERPL-CCNC: 7 U/L — LOW (ref 10–40)
BASOPHILS # BLD AUTO: 0.02 K/UL — SIGNIFICANT CHANGE UP (ref 0–0.2)
BASOPHILS NFR BLD AUTO: 0.6 % — SIGNIFICANT CHANGE UP (ref 0–2)
BILIRUB SERPL-MCNC: 0.3 MG/DL — SIGNIFICANT CHANGE UP (ref 0.2–1.2)
BUN SERPL-MCNC: 45 MG/DL — HIGH (ref 7–23)
CALCIUM SERPL-MCNC: 8.7 MG/DL — SIGNIFICANT CHANGE UP (ref 8.4–10.5)
CHLORIDE SERPL-SCNC: 96 MMOL/L — SIGNIFICANT CHANGE UP (ref 96–108)
CO2 SERPL-SCNC: 25 MMOL/L — SIGNIFICANT CHANGE UP (ref 22–31)
CREAT SERPL-MCNC: 7.63 MG/DL — HIGH (ref 0.5–1.3)
EGFR: 7 ML/MIN/1.73M2 — LOW
EGFR: 7 ML/MIN/1.73M2 — LOW
EOSINOPHIL # BLD AUTO: 0.11 K/UL — SIGNIFICANT CHANGE UP (ref 0–0.5)
EOSINOPHIL NFR BLD AUTO: 3.1 % — SIGNIFICANT CHANGE UP (ref 0–6)
GLUCOSE BLDC GLUCOMTR-MCNC: 163 MG/DL — HIGH (ref 70–99)
GLUCOSE BLDC GLUCOMTR-MCNC: 198 MG/DL — HIGH (ref 70–99)
GLUCOSE BLDC GLUCOMTR-MCNC: 203 MG/DL — HIGH (ref 70–99)
GLUCOSE BLDC GLUCOMTR-MCNC: 216 MG/DL — HIGH (ref 70–99)
GLUCOSE BLDC GLUCOMTR-MCNC: 75 MG/DL — SIGNIFICANT CHANGE UP (ref 70–99)
GLUCOSE SERPL-MCNC: 174 MG/DL — HIGH (ref 70–99)
HBV CORE AB SER-ACNC: REACTIVE
HCT VFR BLD CALC: 22.2 % — LOW (ref 39–50)
HGB BLD-MCNC: 7.2 G/DL — LOW (ref 13–17)
IMM GRANULOCYTES NFR BLD AUTO: 0.8 % — SIGNIFICANT CHANGE UP (ref 0–0.9)
LYMPHOCYTES # BLD AUTO: 0.12 K/UL — LOW (ref 1–3.3)
LYMPHOCYTES # BLD AUTO: 3.4 % — LOW (ref 13–44)
MAGNESIUM SERPL-MCNC: 1.8 MG/DL — SIGNIFICANT CHANGE UP (ref 1.6–2.6)
MCHC RBC-ENTMCNC: 30.4 PG — SIGNIFICANT CHANGE UP (ref 27–34)
MCHC RBC-ENTMCNC: 32.4 G/DL — SIGNIFICANT CHANGE UP (ref 32–36)
MCV RBC AUTO: 93.7 FL — SIGNIFICANT CHANGE UP (ref 80–100)
MONOCYTES # BLD AUTO: 0.15 K/UL — SIGNIFICANT CHANGE UP (ref 0–0.9)
MONOCYTES NFR BLD AUTO: 4.2 % — SIGNIFICANT CHANGE UP (ref 2–14)
NEUTROPHILS # BLD AUTO: 3.1 K/UL — SIGNIFICANT CHANGE UP (ref 1.8–7.4)
NEUTROPHILS NFR BLD AUTO: 87.9 % — HIGH (ref 43–77)
NRBC BLD AUTO-RTO: 0 /100 WBCS — SIGNIFICANT CHANGE UP (ref 0–0)
PHOSPHATE SERPL-MCNC: 4.9 MG/DL — HIGH (ref 2.5–4.5)
PLATELET # BLD AUTO: 132 K/UL — LOW (ref 150–400)
POTASSIUM SERPL-MCNC: 3.8 MMOL/L — SIGNIFICANT CHANGE UP (ref 3.5–5.3)
POTASSIUM SERPL-SCNC: 3.8 MMOL/L — SIGNIFICANT CHANGE UP (ref 3.5–5.3)
PROT SERPL-MCNC: 6.3 G/DL — SIGNIFICANT CHANGE UP (ref 6–8.3)
RBC # BLD: 2.37 M/UL — LOW (ref 4.2–5.8)
RBC # FLD: 13 % — SIGNIFICANT CHANGE UP (ref 10.3–14.5)
SODIUM SERPL-SCNC: 136 MMOL/L — SIGNIFICANT CHANGE UP (ref 135–145)
TACROLIMUS SERPL-MCNC: 5.6 NG/ML — SIGNIFICANT CHANGE UP
WBC # BLD: 3.53 K/UL — LOW (ref 3.8–10.5)
WBC # FLD AUTO: 3.53 K/UL — LOW (ref 3.8–10.5)

## 2025-03-14 PROCEDURE — 99233 SBSQ HOSP IP/OBS HIGH 50: CPT

## 2025-03-14 PROCEDURE — 99232 SBSQ HOSP IP/OBS MODERATE 35: CPT | Mod: FS,GC,24

## 2025-03-14 PROCEDURE — 90935 HEMODIALYSIS ONE EVALUATION: CPT | Mod: GC

## 2025-03-14 RX ORDER — TACROLIMUS 0.5 MG/1
1 CAPSULE ORAL ONCE
Refills: 0 | Status: COMPLETED | OUTPATIENT
Start: 2025-03-14 | End: 2025-03-14

## 2025-03-14 RX ORDER — LINAGLIPTIN 5 MG/1
1 TABLET, FILM COATED ORAL
Qty: 60 | Refills: 0
Start: 2025-03-14 | End: 2025-05-12

## 2025-03-14 RX ORDER — INSULIN GLARGINE-YFGN 100 [IU]/ML
12 INJECTION, SOLUTION SUBCUTANEOUS AT BEDTIME
Refills: 0 | Status: DISCONTINUED | OUTPATIENT
Start: 2025-03-14 | End: 2025-03-17

## 2025-03-14 RX ORDER — TACROLIMUS 0.5 MG/1
9 CAPSULE ORAL
Refills: 0 | Status: DISCONTINUED | OUTPATIENT
Start: 2025-03-15 | End: 2025-03-15

## 2025-03-14 RX ADMIN — Medication 1 TABLET(S): at 11:02

## 2025-03-14 RX ADMIN — INSULIN GLARGINE-YFGN 12 UNIT(S): 100 INJECTION, SOLUTION SUBCUTANEOUS at 22:29

## 2025-03-14 RX ADMIN — TACROLIMUS 8 MILLIGRAM(S): 0.5 CAPSULE ORAL at 08:25

## 2025-03-14 RX ADMIN — TAMSULOSIN HYDROCHLORIDE 0.4 MILLIGRAM(S): 0.4 CAPSULE ORAL at 21:44

## 2025-03-14 RX ADMIN — INSULIN LISPRO 3 UNIT(S): 100 INJECTION, SOLUTION INTRAVENOUS; SUBCUTANEOUS at 12:14

## 2025-03-14 RX ADMIN — MYCOPHENOLATE MOFETIL 500 MILLIGRAM(S): 500 TABLET, FILM COATED ORAL at 21:44

## 2025-03-14 RX ADMIN — TACROLIMUS 1 MILLIGRAM(S): 0.5 CAPSULE ORAL at 11:02

## 2025-03-14 RX ADMIN — Medication 650 MILLIGRAM(S): at 22:02

## 2025-03-14 RX ADMIN — INSULIN LISPRO 1: 100 INJECTION, SOLUTION INTRAVENOUS; SUBCUTANEOUS at 17:43

## 2025-03-14 RX ADMIN — MYCOPHENOLATE MOFETIL 500 MILLIGRAM(S): 500 TABLET, FILM COATED ORAL at 08:25

## 2025-03-14 RX ADMIN — INSULIN LISPRO 2: 100 INJECTION, SOLUTION INTRAVENOUS; SUBCUTANEOUS at 12:15

## 2025-03-14 RX ADMIN — INSULIN LISPRO 3 UNIT(S): 100 INJECTION, SOLUTION INTRAVENOUS; SUBCUTANEOUS at 17:42

## 2025-03-14 RX ADMIN — Medication 1 APPLICATION(S): at 11:03

## 2025-03-14 RX ADMIN — CARVEDILOL 12.5 MILLIGRAM(S): 3.12 TABLET, FILM COATED ORAL at 05:54

## 2025-03-14 RX ADMIN — INSULIN LISPRO 1: 100 INJECTION, SOLUTION INTRAVENOUS; SUBCUTANEOUS at 08:52

## 2025-03-14 RX ADMIN — PREDNISONE 5 MILLIGRAM(S): 20 TABLET ORAL at 05:54

## 2025-03-14 RX ADMIN — VALGANCICLOVIR 450 MILLIGRAM(S): 450 TABLET, FILM COATED ORAL at 11:02

## 2025-03-14 RX ADMIN — INSULIN LISPRO 3 UNIT(S): 100 INJECTION, SOLUTION INTRAVENOUS; SUBCUTANEOUS at 08:52

## 2025-03-14 RX ADMIN — Medication 20 MILLIGRAM(S): at 11:02

## 2025-03-14 RX ADMIN — CARVEDILOL 12.5 MILLIGRAM(S): 3.12 TABLET, FILM COATED ORAL at 21:44

## 2025-03-15 LAB
ALBUMIN SERPL ELPH-MCNC: 3.2 G/DL — LOW (ref 3.3–5)
ALP SERPL-CCNC: 83 U/L — SIGNIFICANT CHANGE UP (ref 40–120)
ALT FLD-CCNC: <5 U/L — LOW (ref 10–45)
ANION GAP SERPL CALC-SCNC: 12 MMOL/L — SIGNIFICANT CHANGE UP (ref 5–17)
AST SERPL-CCNC: 8 U/L — LOW (ref 10–40)
BASOPHILS # BLD AUTO: 0.03 K/UL — SIGNIFICANT CHANGE UP (ref 0–0.2)
BASOPHILS NFR BLD AUTO: 0.8 % — SIGNIFICANT CHANGE UP (ref 0–2)
BILIRUB SERPL-MCNC: 0.2 MG/DL — SIGNIFICANT CHANGE UP (ref 0.2–1.2)
BUN SERPL-MCNC: 30 MG/DL — HIGH (ref 7–23)
CALCIUM SERPL-MCNC: 8.4 MG/DL — SIGNIFICANT CHANGE UP (ref 8.4–10.5)
CHLORIDE SERPL-SCNC: 97 MMOL/L — SIGNIFICANT CHANGE UP (ref 96–108)
CO2 SERPL-SCNC: 27 MMOL/L — SIGNIFICANT CHANGE UP (ref 22–31)
CREAT SERPL-MCNC: 5.51 MG/DL — HIGH (ref 0.5–1.3)
CULTURE RESULTS: SIGNIFICANT CHANGE UP
EGFR: 11 ML/MIN/1.73M2 — LOW
EGFR: 11 ML/MIN/1.73M2 — LOW
EOSINOPHIL # BLD AUTO: 0.09 K/UL — SIGNIFICANT CHANGE UP (ref 0–0.5)
EOSINOPHIL NFR BLD AUTO: 2.5 % — SIGNIFICANT CHANGE UP (ref 0–6)
GLUCOSE BLDC GLUCOMTR-MCNC: 120 MG/DL — HIGH (ref 70–99)
GLUCOSE BLDC GLUCOMTR-MCNC: 206 MG/DL — HIGH (ref 70–99)
GLUCOSE BLDC GLUCOMTR-MCNC: 207 MG/DL — HIGH (ref 70–99)
GLUCOSE BLDC GLUCOMTR-MCNC: 212 MG/DL — HIGH (ref 70–99)
GLUCOSE SERPL-MCNC: 208 MG/DL — HIGH (ref 70–99)
HCT VFR BLD CALC: 21.9 % — LOW (ref 39–50)
HGB BLD-MCNC: 7.1 G/DL — LOW (ref 13–17)
IMM GRANULOCYTES NFR BLD AUTO: 0.3 % — SIGNIFICANT CHANGE UP (ref 0–0.9)
LYMPHOCYTES # BLD AUTO: 0.17 K/UL — LOW (ref 1–3.3)
LYMPHOCYTES # BLD AUTO: 4.8 % — LOW (ref 13–44)
MAGNESIUM SERPL-MCNC: 1.7 MG/DL — SIGNIFICANT CHANGE UP (ref 1.6–2.6)
MCHC RBC-ENTMCNC: 30.6 PG — SIGNIFICANT CHANGE UP (ref 27–34)
MCHC RBC-ENTMCNC: 32.4 G/DL — SIGNIFICANT CHANGE UP (ref 32–36)
MCV RBC AUTO: 94.4 FL — SIGNIFICANT CHANGE UP (ref 80–100)
MONOCYTES # BLD AUTO: 0.14 K/UL — SIGNIFICANT CHANGE UP (ref 0–0.9)
MONOCYTES NFR BLD AUTO: 4 % — SIGNIFICANT CHANGE UP (ref 2–14)
NEUTROPHILS # BLD AUTO: 3.09 K/UL — SIGNIFICANT CHANGE UP (ref 1.8–7.4)
NEUTROPHILS NFR BLD AUTO: 87.6 % — HIGH (ref 43–77)
NRBC BLD AUTO-RTO: 0 /100 WBCS — SIGNIFICANT CHANGE UP (ref 0–0)
PHOSPHATE SERPL-MCNC: 3.8 MG/DL — SIGNIFICANT CHANGE UP (ref 2.5–4.5)
PLATELET # BLD AUTO: 128 K/UL — LOW (ref 150–400)
POTASSIUM SERPL-MCNC: 3.8 MMOL/L — SIGNIFICANT CHANGE UP (ref 3.5–5.3)
POTASSIUM SERPL-SCNC: 3.8 MMOL/L — SIGNIFICANT CHANGE UP (ref 3.5–5.3)
PROT SERPL-MCNC: 5.9 G/DL — LOW (ref 6–8.3)
RBC # BLD: 2.32 M/UL — LOW (ref 4.2–5.8)
RBC # FLD: 12.8 % — SIGNIFICANT CHANGE UP (ref 10.3–14.5)
SODIUM SERPL-SCNC: 136 MMOL/L — SIGNIFICANT CHANGE UP (ref 135–145)
SPECIMEN SOURCE: SIGNIFICANT CHANGE UP
TACROLIMUS SERPL-MCNC: 4.3 NG/ML — SIGNIFICANT CHANGE UP
WBC # BLD: 3.53 K/UL — LOW (ref 3.8–10.5)
WBC # FLD AUTO: 3.53 K/UL — LOW (ref 3.8–10.5)

## 2025-03-15 PROCEDURE — 99232 SBSQ HOSP IP/OBS MODERATE 35: CPT | Mod: FS,GC,24

## 2025-03-15 PROCEDURE — 99232 SBSQ HOSP IP/OBS MODERATE 35: CPT

## 2025-03-15 RX ORDER — HEPARIN SODIUM 1000 [USP'U]/ML
5000 INJECTION INTRAVENOUS; SUBCUTANEOUS EVERY 8 HOURS
Refills: 0 | Status: DISCONTINUED | OUTPATIENT
Start: 2025-03-15 | End: 2025-03-15

## 2025-03-15 RX ORDER — TACROLIMUS 0.5 MG/1
1 CAPSULE ORAL ONCE
Refills: 0 | Status: COMPLETED | OUTPATIENT
Start: 2025-03-15 | End: 2025-03-15

## 2025-03-15 RX ORDER — TACROLIMUS 0.5 MG/1
10 CAPSULE ORAL
Refills: 0 | Status: DISCONTINUED | OUTPATIENT
Start: 2025-03-16 | End: 2025-03-18

## 2025-03-15 RX ORDER — HEPARIN SODIUM 1000 [USP'U]/ML
5000 INJECTION INTRAVENOUS; SUBCUTANEOUS EVERY 12 HOURS
Refills: 0 | Status: DISCONTINUED | OUTPATIENT
Start: 2025-03-15 | End: 2025-03-17

## 2025-03-15 RX ADMIN — INSULIN LISPRO 3 UNIT(S): 100 INJECTION, SOLUTION INTRAVENOUS; SUBCUTANEOUS at 17:37

## 2025-03-15 RX ADMIN — CARVEDILOL 12.5 MILLIGRAM(S): 3.12 TABLET, FILM COATED ORAL at 09:25

## 2025-03-15 RX ADMIN — INSULIN LISPRO 3 UNIT(S): 100 INJECTION, SOLUTION INTRAVENOUS; SUBCUTANEOUS at 13:08

## 2025-03-15 RX ADMIN — INSULIN GLARGINE-YFGN 12 UNIT(S): 100 INJECTION, SOLUTION SUBCUTANEOUS at 21:34

## 2025-03-15 RX ADMIN — MYCOPHENOLATE MOFETIL 500 MILLIGRAM(S): 500 TABLET, FILM COATED ORAL at 09:16

## 2025-03-15 RX ADMIN — Medication 20 MILLIGRAM(S): at 13:08

## 2025-03-15 RX ADMIN — PREDNISONE 5 MILLIGRAM(S): 20 TABLET ORAL at 05:52

## 2025-03-15 RX ADMIN — TACROLIMUS 1 MILLIGRAM(S): 0.5 CAPSULE ORAL at 14:20

## 2025-03-15 RX ADMIN — INSULIN LISPRO 2: 100 INJECTION, SOLUTION INTRAVENOUS; SUBCUTANEOUS at 09:15

## 2025-03-15 RX ADMIN — INSULIN LISPRO 2: 100 INJECTION, SOLUTION INTRAVENOUS; SUBCUTANEOUS at 13:08

## 2025-03-15 RX ADMIN — Medication 650 MILLIGRAM(S): at 00:00

## 2025-03-15 RX ADMIN — INSULIN LISPRO 3 UNIT(S): 100 INJECTION, SOLUTION INTRAVENOUS; SUBCUTANEOUS at 09:15

## 2025-03-15 RX ADMIN — TAMSULOSIN HYDROCHLORIDE 0.4 MILLIGRAM(S): 0.4 CAPSULE ORAL at 21:33

## 2025-03-15 RX ADMIN — Medication 1 APPLICATION(S): at 13:12

## 2025-03-15 RX ADMIN — MYCOPHENOLATE MOFETIL 500 MILLIGRAM(S): 500 TABLET, FILM COATED ORAL at 20:32

## 2025-03-15 RX ADMIN — TACROLIMUS 9 MILLIGRAM(S): 0.5 CAPSULE ORAL at 09:16

## 2025-03-16 LAB
ALBUMIN SERPL ELPH-MCNC: 3.2 G/DL — LOW (ref 3.3–5)
ALP SERPL-CCNC: 81 U/L — SIGNIFICANT CHANGE UP (ref 40–120)
ALT FLD-CCNC: 6 U/L — LOW (ref 10–45)
ANION GAP SERPL CALC-SCNC: 16 MMOL/L — SIGNIFICANT CHANGE UP (ref 5–17)
AST SERPL-CCNC: 7 U/L — LOW (ref 10–40)
BASOPHILS # BLD AUTO: 0.05 K/UL — SIGNIFICANT CHANGE UP (ref 0–0.2)
BASOPHILS NFR BLD AUTO: 1.7 % — SIGNIFICANT CHANGE UP (ref 0–2)
BILIRUB SERPL-MCNC: 0.4 MG/DL — SIGNIFICANT CHANGE UP (ref 0.2–1.2)
BUN SERPL-MCNC: 41 MG/DL — HIGH (ref 7–23)
CALCIUM SERPL-MCNC: 8.6 MG/DL — SIGNIFICANT CHANGE UP (ref 8.4–10.5)
CHLORIDE SERPL-SCNC: 100 MMOL/L — SIGNIFICANT CHANGE UP (ref 96–108)
CO2 SERPL-SCNC: 23 MMOL/L — SIGNIFICANT CHANGE UP (ref 22–31)
CREAT SERPL-MCNC: 6.72 MG/DL — HIGH (ref 0.5–1.3)
EGFR: 8 ML/MIN/1.73M2 — LOW
EGFR: 8 ML/MIN/1.73M2 — LOW
EOSINOPHIL # BLD AUTO: 0.19 K/UL — SIGNIFICANT CHANGE UP (ref 0–0.5)
EOSINOPHIL NFR BLD AUTO: 6.1 % — HIGH (ref 0–6)
GLUCOSE BLDC GLUCOMTR-MCNC: 146 MG/DL — HIGH (ref 70–99)
GLUCOSE BLDC GLUCOMTR-MCNC: 146 MG/DL — HIGH (ref 70–99)
GLUCOSE BLDC GLUCOMTR-MCNC: 176 MG/DL — HIGH (ref 70–99)
GLUCOSE BLDC GLUCOMTR-MCNC: 187 MG/DL — HIGH (ref 70–99)
GLUCOSE BLDC GLUCOMTR-MCNC: 225 MG/DL — HIGH (ref 70–99)
GLUCOSE SERPL-MCNC: 129 MG/DL — HIGH (ref 70–99)
HCT VFR BLD CALC: 20.8 % — CRITICAL LOW (ref 39–50)
HGB BLD-MCNC: 6.7 G/DL — CRITICAL LOW (ref 13–17)
LYMPHOCYTES # BLD AUTO: 0.11 K/UL — LOW (ref 1–3.3)
LYMPHOCYTES # BLD AUTO: 3.5 % — LOW (ref 13–44)
MAGNESIUM SERPL-MCNC: 1.6 MG/DL — SIGNIFICANT CHANGE UP (ref 1.6–2.6)
MANUAL SMEAR VERIFICATION: SIGNIFICANT CHANGE UP
MCHC RBC-ENTMCNC: 30 PG — SIGNIFICANT CHANGE UP (ref 27–34)
MCHC RBC-ENTMCNC: 32.2 G/DL — SIGNIFICANT CHANGE UP (ref 32–36)
MCV RBC AUTO: 93.3 FL — SIGNIFICANT CHANGE UP (ref 80–100)
MONOCYTES # BLD AUTO: 0.21 K/UL — SIGNIFICANT CHANGE UP (ref 0–0.9)
MONOCYTES NFR BLD AUTO: 7 % — SIGNIFICANT CHANGE UP (ref 2–14)
NEUTROPHILS # BLD AUTO: 2.49 K/UL — SIGNIFICANT CHANGE UP (ref 1.8–7.4)
NEUTROPHILS NFR BLD AUTO: 81.7 % — HIGH (ref 43–77)
PHOSPHATE SERPL-MCNC: 4.6 MG/DL — HIGH (ref 2.5–4.5)
PLAT MORPH BLD: NORMAL — SIGNIFICANT CHANGE UP
PLATELET # BLD AUTO: 121 K/UL — LOW (ref 150–400)
POTASSIUM SERPL-MCNC: 4.2 MMOL/L — SIGNIFICANT CHANGE UP (ref 3.5–5.3)
POTASSIUM SERPL-SCNC: 4.2 MMOL/L — SIGNIFICANT CHANGE UP (ref 3.5–5.3)
PROT SERPL-MCNC: 5.9 G/DL — LOW (ref 6–8.3)
RBC # BLD: 2.23 M/UL — LOW (ref 4.2–5.8)
RBC # FLD: 13 % — SIGNIFICANT CHANGE UP (ref 10.3–14.5)
RBC BLD AUTO: SIGNIFICANT CHANGE UP
SODIUM SERPL-SCNC: 139 MMOL/L — SIGNIFICANT CHANGE UP (ref 135–145)
TACROLIMUS SERPL-MCNC: 7.9 NG/ML — SIGNIFICANT CHANGE UP
WBC # BLD: 3.05 K/UL — LOW (ref 3.8–10.5)
WBC # FLD AUTO: 3.05 K/UL — LOW (ref 3.8–10.5)

## 2025-03-16 PROCEDURE — 99232 SBSQ HOSP IP/OBS MODERATE 35: CPT

## 2025-03-16 PROCEDURE — 99233 SBSQ HOSP IP/OBS HIGH 50: CPT

## 2025-03-16 PROCEDURE — 99232 SBSQ HOSP IP/OBS MODERATE 35: CPT | Mod: FS,GC,24

## 2025-03-16 RX ORDER — EPOETIN ALFA 10000 [IU]/ML
10000 SOLUTION INTRAVENOUS; SUBCUTANEOUS
Refills: 0 | Status: DISCONTINUED | OUTPATIENT
Start: 2025-03-16 | End: 2025-03-18

## 2025-03-16 RX ADMIN — MYCOPHENOLATE MOFETIL 500 MILLIGRAM(S): 500 TABLET, FILM COATED ORAL at 20:12

## 2025-03-16 RX ADMIN — TRAMADOL HYDROCHLORIDE 25 MILLIGRAM(S): 50 TABLET, FILM COATED ORAL at 18:19

## 2025-03-16 RX ADMIN — TRAMADOL HYDROCHLORIDE 25 MILLIGRAM(S): 50 TABLET, FILM COATED ORAL at 17:19

## 2025-03-16 RX ADMIN — MYCOPHENOLATE MOFETIL 500 MILLIGRAM(S): 500 TABLET, FILM COATED ORAL at 08:21

## 2025-03-16 RX ADMIN — INSULIN LISPRO 3 UNIT(S): 100 INJECTION, SOLUTION INTRAVENOUS; SUBCUTANEOUS at 14:20

## 2025-03-16 RX ADMIN — Medication 650 MILLIGRAM(S): at 05:41

## 2025-03-16 RX ADMIN — EPOETIN ALFA 10000 UNIT(S): 10000 SOLUTION INTRAVENOUS; SUBCUTANEOUS at 19:35

## 2025-03-16 RX ADMIN — Medication 20 MILLIGRAM(S): at 12:58

## 2025-03-16 RX ADMIN — INSULIN LISPRO 1: 100 INJECTION, SOLUTION INTRAVENOUS; SUBCUTANEOUS at 14:21

## 2025-03-16 RX ADMIN — TRAMADOL HYDROCHLORIDE 25 MILLIGRAM(S): 50 TABLET, FILM COATED ORAL at 01:16

## 2025-03-16 RX ADMIN — TAMSULOSIN HYDROCHLORIDE 0.4 MILLIGRAM(S): 0.4 CAPSULE ORAL at 21:23

## 2025-03-16 RX ADMIN — CARVEDILOL 12.5 MILLIGRAM(S): 3.12 TABLET, FILM COATED ORAL at 05:40

## 2025-03-16 RX ADMIN — TRAMADOL HYDROCHLORIDE 25 MILLIGRAM(S): 50 TABLET, FILM COATED ORAL at 00:21

## 2025-03-16 RX ADMIN — INSULIN GLARGINE-YFGN 12 UNIT(S): 100 INJECTION, SOLUTION SUBCUTANEOUS at 21:23

## 2025-03-16 RX ADMIN — TACROLIMUS 10 MILLIGRAM(S): 0.5 CAPSULE ORAL at 08:21

## 2025-03-16 RX ADMIN — Medication 650 MILLIGRAM(S): at 06:18

## 2025-03-16 RX ADMIN — Medication 1 APPLICATION(S): at 12:58

## 2025-03-16 RX ADMIN — HEPARIN SODIUM 5000 UNIT(S): 1000 INJECTION INTRAVENOUS; SUBCUTANEOUS at 17:19

## 2025-03-16 RX ADMIN — INSULIN LISPRO 3 UNIT(S): 100 INJECTION, SOLUTION INTRAVENOUS; SUBCUTANEOUS at 08:18

## 2025-03-16 RX ADMIN — HEPARIN SODIUM 5000 UNIT(S): 1000 INJECTION INTRAVENOUS; SUBCUTANEOUS at 05:41

## 2025-03-16 RX ADMIN — PREDNISONE 5 MILLIGRAM(S): 20 TABLET ORAL at 05:41

## 2025-03-17 LAB
ALBUMIN SERPL ELPH-MCNC: 3.4 G/DL — SIGNIFICANT CHANGE UP (ref 3.3–5)
ALP SERPL-CCNC: 77 U/L — SIGNIFICANT CHANGE UP (ref 40–120)
ALT FLD-CCNC: 5 U/L — LOW (ref 10–45)
ANION GAP SERPL CALC-SCNC: 16 MMOL/L — SIGNIFICANT CHANGE UP (ref 5–17)
AST SERPL-CCNC: 9 U/L — LOW (ref 10–40)
BASOPHILS # BLD AUTO: 0.03 K/UL — SIGNIFICANT CHANGE UP (ref 0–0.2)
BASOPHILS NFR BLD AUTO: 0.5 % — SIGNIFICANT CHANGE UP (ref 0–2)
BILIRUB SERPL-MCNC: 0.8 MG/DL — SIGNIFICANT CHANGE UP (ref 0.2–1.2)
BUN SERPL-MCNC: 52 MG/DL — HIGH (ref 7–23)
CALCIUM SERPL-MCNC: 8.9 MG/DL — SIGNIFICANT CHANGE UP (ref 8.4–10.5)
CHLORIDE SERPL-SCNC: 101 MMOL/L — SIGNIFICANT CHANGE UP (ref 96–108)
CO2 SERPL-SCNC: 23 MMOL/L — SIGNIFICANT CHANGE UP (ref 22–31)
CREAT SERPL-MCNC: 7.1 MG/DL — HIGH (ref 0.5–1.3)
EGFR: 8 ML/MIN/1.73M2 — LOW
EGFR: 8 ML/MIN/1.73M2 — LOW
EOSINOPHIL # BLD AUTO: 0.1 K/UL — SIGNIFICANT CHANGE UP (ref 0–0.5)
EOSINOPHIL NFR BLD AUTO: 1.8 % — SIGNIFICANT CHANGE UP (ref 0–6)
GLUCOSE BLDC GLUCOMTR-MCNC: 105 MG/DL — HIGH (ref 70–99)
GLUCOSE BLDC GLUCOMTR-MCNC: 132 MG/DL — HIGH (ref 70–99)
GLUCOSE BLDC GLUCOMTR-MCNC: 157 MG/DL — HIGH (ref 70–99)
GLUCOSE BLDC GLUCOMTR-MCNC: 189 MG/DL — HIGH (ref 70–99)
GLUCOSE BLDC GLUCOMTR-MCNC: 191 MG/DL — HIGH (ref 70–99)
GLUCOSE SERPL-MCNC: 58 MG/DL — LOW (ref 70–99)
HCT VFR BLD CALC: 25.2 % — LOW (ref 39–50)
HGB BLD-MCNC: 8.2 G/DL — LOW (ref 13–17)
IMM GRANULOCYTES NFR BLD AUTO: 0.5 % — SIGNIFICANT CHANGE UP (ref 0–0.9)
LYMPHOCYTES # BLD AUTO: 0.21 K/UL — LOW (ref 1–3.3)
LYMPHOCYTES # BLD AUTO: 3.7 % — LOW (ref 13–44)
MAGNESIUM SERPL-MCNC: 1.7 MG/DL — SIGNIFICANT CHANGE UP (ref 1.6–2.6)
MCHC RBC-ENTMCNC: 30 PG — SIGNIFICANT CHANGE UP (ref 27–34)
MCHC RBC-ENTMCNC: 32.5 G/DL — SIGNIFICANT CHANGE UP (ref 32–36)
MCV RBC AUTO: 92.3 FL — SIGNIFICANT CHANGE UP (ref 80–100)
MONOCYTES # BLD AUTO: 0.3 K/UL — SIGNIFICANT CHANGE UP (ref 0–0.9)
MONOCYTES NFR BLD AUTO: 5.3 % — SIGNIFICANT CHANGE UP (ref 2–14)
NEUTROPHILS # BLD AUTO: 5 K/UL — SIGNIFICANT CHANGE UP (ref 1.8–7.4)
NEUTROPHILS NFR BLD AUTO: 88.2 % — HIGH (ref 43–77)
NRBC BLD AUTO-RTO: 0 /100 WBCS — SIGNIFICANT CHANGE UP (ref 0–0)
PHOSPHATE SERPL-MCNC: 4.5 MG/DL — SIGNIFICANT CHANGE UP (ref 2.5–4.5)
PLATELET # BLD AUTO: 147 K/UL — LOW (ref 150–400)
POTASSIUM SERPL-MCNC: 4.4 MMOL/L — SIGNIFICANT CHANGE UP (ref 3.5–5.3)
POTASSIUM SERPL-SCNC: 4.4 MMOL/L — SIGNIFICANT CHANGE UP (ref 3.5–5.3)
PROT SERPL-MCNC: 6.1 G/DL — SIGNIFICANT CHANGE UP (ref 6–8.3)
RBC # BLD: 2.73 M/UL — LOW (ref 4.2–5.8)
RBC # FLD: 14.6 % — HIGH (ref 10.3–14.5)
SODIUM SERPL-SCNC: 140 MMOL/L — SIGNIFICANT CHANGE UP (ref 135–145)
TACROLIMUS SERPL-MCNC: 4.8 NG/ML — SIGNIFICANT CHANGE UP
WBC # BLD: 5.67 K/UL — SIGNIFICANT CHANGE UP (ref 3.8–10.5)
WBC # FLD AUTO: 5.67 K/UL — SIGNIFICANT CHANGE UP (ref 3.8–10.5)

## 2025-03-17 PROCEDURE — 76776 US EXAM K TRANSPL W/DOPPLER: CPT | Mod: 26,RT

## 2025-03-17 PROCEDURE — 99232 SBSQ HOSP IP/OBS MODERATE 35: CPT | Mod: GC

## 2025-03-17 PROCEDURE — 99232 SBSQ HOSP IP/OBS MODERATE 35: CPT

## 2025-03-17 RX ORDER — LINAGLIPTIN 5 MG/1
5 TABLET, FILM COATED ORAL DAILY
Refills: 0 | Status: DISCONTINUED | OUTPATIENT
Start: 2025-03-17 | End: 2025-03-18

## 2025-03-17 RX ORDER — MAGNESIUM SULFATE 500 MG/ML
2 SYRINGE (ML) INJECTION ONCE
Refills: 0 | Status: COMPLETED | OUTPATIENT
Start: 2025-03-17 | End: 2025-03-17

## 2025-03-17 RX ORDER — BELATACEPT 250 MG/1
425 INJECTION, POWDER, LYOPHILIZED, FOR SOLUTION INTRAVENOUS ONCE
Refills: 0 | Status: COMPLETED | OUTPATIENT
Start: 2025-03-17 | End: 2025-03-17

## 2025-03-17 RX ORDER — EPOETIN ALFA 10000 [IU]/ML
10000 SOLUTION INTRAVENOUS; SUBCUTANEOUS
Refills: 0 | Status: DISCONTINUED | OUTPATIENT
Start: 2025-03-17 | End: 2025-03-22

## 2025-03-17 RX ORDER — INSULIN GLARGINE-YFGN 100 [IU]/ML
8 INJECTION, SOLUTION SUBCUTANEOUS AT BEDTIME
Refills: 0 | Status: DISCONTINUED | OUTPATIENT
Start: 2025-03-17 | End: 2025-03-17

## 2025-03-17 RX ORDER — REPAGLINIDE 1 MG/1
1 TABLET ORAL
Refills: 0 | Status: DISCONTINUED | OUTPATIENT
Start: 2025-03-17 | End: 2025-03-18

## 2025-03-17 RX ADMIN — MYCOPHENOLATE MOFETIL 500 MILLIGRAM(S): 500 TABLET, FILM COATED ORAL at 09:07

## 2025-03-17 RX ADMIN — PREDNISONE 5 MILLIGRAM(S): 20 TABLET ORAL at 05:15

## 2025-03-17 RX ADMIN — HEPARIN SODIUM 5000 UNIT(S): 1000 INJECTION INTRAVENOUS; SUBCUTANEOUS at 05:15

## 2025-03-17 RX ADMIN — Medication 1 TABLET(S): at 12:25

## 2025-03-17 RX ADMIN — Medication 25 GRAM(S): at 17:32

## 2025-03-17 RX ADMIN — INSULIN LISPRO 1: 100 INJECTION, SOLUTION INTRAVENOUS; SUBCUTANEOUS at 17:31

## 2025-03-17 RX ADMIN — REPAGLINIDE 1 MILLIGRAM(S): 1 TABLET ORAL at 14:13

## 2025-03-17 RX ADMIN — GABAPENTIN 300 MILLIGRAM(S): 400 CAPSULE ORAL at 05:21

## 2025-03-17 RX ADMIN — TAMSULOSIN HYDROCHLORIDE 0.4 MILLIGRAM(S): 0.4 CAPSULE ORAL at 21:29

## 2025-03-17 RX ADMIN — TRAMADOL HYDROCHLORIDE 25 MILLIGRAM(S): 50 TABLET, FILM COATED ORAL at 03:36

## 2025-03-17 RX ADMIN — INSULIN LISPRO 1: 100 INJECTION, SOLUTION INTRAVENOUS; SUBCUTANEOUS at 14:13

## 2025-03-17 RX ADMIN — Medication 20 MILLIGRAM(S): at 14:12

## 2025-03-17 RX ADMIN — CARVEDILOL 12.5 MILLIGRAM(S): 3.12 TABLET, FILM COATED ORAL at 05:15

## 2025-03-17 RX ADMIN — CARVEDILOL 12.5 MILLIGRAM(S): 3.12 TABLET, FILM COATED ORAL at 17:33

## 2025-03-17 RX ADMIN — EPOETIN ALFA 10000 UNIT(S): 10000 SOLUTION INTRAVENOUS; SUBCUTANEOUS at 14:44

## 2025-03-17 RX ADMIN — BELATACEPT 200 MILLIGRAM(S): 250 INJECTION, POWDER, LYOPHILIZED, FOR SOLUTION INTRAVENOUS at 11:55

## 2025-03-17 RX ADMIN — Medication 1 APPLICATION(S): at 12:25

## 2025-03-17 RX ADMIN — TRAMADOL HYDROCHLORIDE 25 MILLIGRAM(S): 50 TABLET, FILM COATED ORAL at 04:30

## 2025-03-17 RX ADMIN — MYCOPHENOLATE MOFETIL 500 MILLIGRAM(S): 500 TABLET, FILM COATED ORAL at 20:20

## 2025-03-17 RX ADMIN — LINAGLIPTIN 5 MILLIGRAM(S): 5 TABLET, FILM COATED ORAL at 14:13

## 2025-03-17 RX ADMIN — INSULIN LISPRO 3 UNIT(S): 100 INJECTION, SOLUTION INTRAVENOUS; SUBCUTANEOUS at 09:06

## 2025-03-17 RX ADMIN — REPAGLINIDE 1 MILLIGRAM(S): 1 TABLET ORAL at 17:31

## 2025-03-17 RX ADMIN — VALGANCICLOVIR 450 MILLIGRAM(S): 450 TABLET, FILM COATED ORAL at 14:12

## 2025-03-17 RX ADMIN — TACROLIMUS 10 MILLIGRAM(S): 0.5 CAPSULE ORAL at 09:07

## 2025-03-18 LAB
ADD ON TEST-SPECIMEN IN LAB: SIGNIFICANT CHANGE UP
ALBUMIN SERPL ELPH-MCNC: 3.2 G/DL — LOW (ref 3.3–5)
ALP SERPL-CCNC: 80 U/L — SIGNIFICANT CHANGE UP (ref 40–120)
ALT FLD-CCNC: 6 U/L — LOW (ref 10–45)
ANION GAP SERPL CALC-SCNC: 13 MMOL/L — SIGNIFICANT CHANGE UP (ref 5–17)
APTT BLD: 26.1 SEC — SIGNIFICANT CHANGE UP (ref 24.5–35.6)
AST SERPL-CCNC: <5 U/L — LOW (ref 10–40)
BASOPHILS # BLD AUTO: 0.02 K/UL — SIGNIFICANT CHANGE UP (ref 0–0.2)
BASOPHILS NFR BLD AUTO: 0.6 % — SIGNIFICANT CHANGE UP (ref 0–2)
BILIRUB SERPL-MCNC: 0.2 MG/DL — SIGNIFICANT CHANGE UP (ref 0.2–1.2)
BUN SERPL-MCNC: 53 MG/DL — HIGH (ref 7–23)
CALCIUM SERPL-MCNC: 8.6 MG/DL — SIGNIFICANT CHANGE UP (ref 8.4–10.5)
CHLORIDE SERPL-SCNC: 104 MMOL/L — SIGNIFICANT CHANGE UP (ref 96–108)
CO2 SERPL-SCNC: 23 MMOL/L — SIGNIFICANT CHANGE UP (ref 22–31)
CREAT SERPL-MCNC: 6.82 MG/DL — HIGH (ref 0.5–1.3)
EGFR: 8 ML/MIN/1.73M2 — LOW
EGFR: 8 ML/MIN/1.73M2 — LOW
EOSINOPHIL # BLD AUTO: 0.08 K/UL — SIGNIFICANT CHANGE UP (ref 0–0.5)
EOSINOPHIL NFR BLD AUTO: 2.3 % — SIGNIFICANT CHANGE UP (ref 0–6)
GLUCOSE BLDC GLUCOMTR-MCNC: 120 MG/DL — HIGH (ref 70–99)
GLUCOSE BLDC GLUCOMTR-MCNC: 151 MG/DL — HIGH (ref 70–99)
GLUCOSE BLDC GLUCOMTR-MCNC: 312 MG/DL — HIGH (ref 70–99)
GLUCOSE BLDC GLUCOMTR-MCNC: 55 MG/DL — LOW (ref 70–99)
GLUCOSE BLDC GLUCOMTR-MCNC: 60 MG/DL — LOW (ref 70–99)
GLUCOSE BLDC GLUCOMTR-MCNC: 64 MG/DL — LOW (ref 70–99)
GLUCOSE BLDC GLUCOMTR-MCNC: 87 MG/DL — SIGNIFICANT CHANGE UP (ref 70–99)
GLUCOSE BLDC GLUCOMTR-MCNC: 89 MG/DL — SIGNIFICANT CHANGE UP (ref 70–99)
GLUCOSE BLDC GLUCOMTR-MCNC: 90 MG/DL — SIGNIFICANT CHANGE UP (ref 70–99)
GLUCOSE BLDC GLUCOMTR-MCNC: 98 MG/DL — SIGNIFICANT CHANGE UP (ref 70–99)
GLUCOSE SERPL-MCNC: 81 MG/DL — SIGNIFICANT CHANGE UP (ref 70–99)
HCT VFR BLD CALC: 23.3 % — LOW (ref 39–50)
HGB BLD-MCNC: 7.6 G/DL — LOW (ref 13–17)
IMM GRANULOCYTES NFR BLD AUTO: 0.6 % — SIGNIFICANT CHANGE UP (ref 0–0.9)
INR BLD: 0.98 RATIO — SIGNIFICANT CHANGE UP (ref 0.85–1.16)
LYMPHOCYTES # BLD AUTO: 0.23 K/UL — LOW (ref 1–3.3)
LYMPHOCYTES # BLD AUTO: 6.5 % — LOW (ref 13–44)
MAGNESIUM SERPL-MCNC: 2 MG/DL — SIGNIFICANT CHANGE UP (ref 1.6–2.6)
MCHC RBC-ENTMCNC: 30.5 PG — SIGNIFICANT CHANGE UP (ref 27–34)
MCHC RBC-ENTMCNC: 32.6 G/DL — SIGNIFICANT CHANGE UP (ref 32–36)
MCV RBC AUTO: 93.6 FL — SIGNIFICANT CHANGE UP (ref 80–100)
MONOCYTES # BLD AUTO: 0.16 K/UL — SIGNIFICANT CHANGE UP (ref 0–0.9)
MONOCYTES NFR BLD AUTO: 4.5 % — SIGNIFICANT CHANGE UP (ref 2–14)
NEUTROPHILS # BLD AUTO: 3.01 K/UL — SIGNIFICANT CHANGE UP (ref 1.8–7.4)
NEUTROPHILS NFR BLD AUTO: 85.5 % — HIGH (ref 43–77)
NRBC BLD AUTO-RTO: 0 /100 WBCS — SIGNIFICANT CHANGE UP (ref 0–0)
PHOSPHATE SERPL-MCNC: 4.2 MG/DL — SIGNIFICANT CHANGE UP (ref 2.5–4.5)
PLATELET # BLD AUTO: 119 K/UL — LOW (ref 150–400)
POTASSIUM SERPL-MCNC: 4.2 MMOL/L — SIGNIFICANT CHANGE UP (ref 3.5–5.3)
POTASSIUM SERPL-SCNC: 4.2 MMOL/L — SIGNIFICANT CHANGE UP (ref 3.5–5.3)
PROT SERPL-MCNC: 6 G/DL — SIGNIFICANT CHANGE UP (ref 6–8.3)
PROTHROM AB SERPL-ACNC: 11.3 SEC — SIGNIFICANT CHANGE UP (ref 9.9–13.4)
RBC # BLD: 2.49 M/UL — LOW (ref 4.2–5.8)
RBC # FLD: 14.4 % — SIGNIFICANT CHANGE UP (ref 10.3–14.5)
SODIUM SERPL-SCNC: 140 MMOL/L — SIGNIFICANT CHANGE UP (ref 135–145)
TACROLIMUS SERPL-MCNC: 6.9 NG/ML — SIGNIFICANT CHANGE UP
WBC # BLD: 3.52 K/UL — LOW (ref 3.8–10.5)
WBC # FLD AUTO: 3.52 K/UL — LOW (ref 3.8–10.5)

## 2025-03-18 PROCEDURE — 99232 SBSQ HOSP IP/OBS MODERATE 35: CPT

## 2025-03-18 PROCEDURE — 99232 SBSQ HOSP IP/OBS MODERATE 35: CPT | Mod: GC

## 2025-03-18 RX ORDER — DEXTROSE 50 % IN WATER 50 %
25 SYRINGE (ML) INTRAVENOUS ONCE
Refills: 0 | Status: COMPLETED | OUTPATIENT
Start: 2025-03-18 | End: 2025-03-18

## 2025-03-18 RX ORDER — NIFEDIPINE 30 MG
30 TABLET, EXTENDED RELEASE 24 HR ORAL DAILY
Refills: 0 | Status: DISCONTINUED | OUTPATIENT
Start: 2025-03-18 | End: 2025-03-22

## 2025-03-18 RX ORDER — TACROLIMUS 0.5 MG/1
8 CAPSULE ORAL
Refills: 0 | Status: DISCONTINUED | OUTPATIENT
Start: 2025-03-19 | End: 2025-03-19

## 2025-03-18 RX ORDER — REPAGLINIDE 1 MG/1
0.5 TABLET ORAL
Refills: 0 | Status: DISCONTINUED | OUTPATIENT
Start: 2025-03-18 | End: 2025-03-18

## 2025-03-18 RX ORDER — DEXTROSE 50 % IN WATER 50 %
12.5 SYRINGE (ML) INTRAVENOUS ONCE
Refills: 0 | Status: COMPLETED | OUTPATIENT
Start: 2025-03-18 | End: 2025-03-18

## 2025-03-18 RX ADMIN — GABAPENTIN 300 MILLIGRAM(S): 400 CAPSULE ORAL at 00:00

## 2025-03-18 RX ADMIN — CARVEDILOL 12.5 MILLIGRAM(S): 3.12 TABLET, FILM COATED ORAL at 05:12

## 2025-03-18 RX ADMIN — Medication 25 GRAM(S): at 13:32

## 2025-03-18 RX ADMIN — MYCOPHENOLATE MOFETIL 500 MILLIGRAM(S): 500 TABLET, FILM COATED ORAL at 20:03

## 2025-03-18 RX ADMIN — CARVEDILOL 12.5 MILLIGRAM(S): 3.12 TABLET, FILM COATED ORAL at 17:25

## 2025-03-18 RX ADMIN — Medication 30 MILLIGRAM(S): at 18:40

## 2025-03-18 RX ADMIN — TRAMADOL HYDROCHLORIDE 25 MILLIGRAM(S): 50 TABLET, FILM COATED ORAL at 12:24

## 2025-03-18 RX ADMIN — INSULIN LISPRO 2: 100 INJECTION, SOLUTION INTRAVENOUS; SUBCUTANEOUS at 21:28

## 2025-03-18 RX ADMIN — Medication 1 APPLICATION(S): at 12:12

## 2025-03-18 RX ADMIN — REPAGLINIDE 1 MILLIGRAM(S): 1 TABLET ORAL at 05:11

## 2025-03-18 RX ADMIN — Medication 12.5 GRAM(S): at 12:39

## 2025-03-18 RX ADMIN — TRAMADOL HYDROCHLORIDE 25 MILLIGRAM(S): 50 TABLET, FILM COATED ORAL at 13:00

## 2025-03-18 RX ADMIN — Medication 12.5 GRAM(S): at 08:52

## 2025-03-18 RX ADMIN — TACROLIMUS 10 MILLIGRAM(S): 0.5 CAPSULE ORAL at 07:44

## 2025-03-18 RX ADMIN — MYCOPHENOLATE MOFETIL 500 MILLIGRAM(S): 500 TABLET, FILM COATED ORAL at 07:43

## 2025-03-18 RX ADMIN — TAMSULOSIN HYDROCHLORIDE 0.4 MILLIGRAM(S): 0.4 CAPSULE ORAL at 21:07

## 2025-03-19 ENCOUNTER — RESULT REVIEW (OUTPATIENT)
Age: 70
End: 2025-03-19

## 2025-03-19 ENCOUNTER — APPOINTMENT (OUTPATIENT)
Dept: TRANSPLANT | Facility: CLINIC | Age: 70
End: 2025-03-19

## 2025-03-19 LAB
ALBUMIN SERPL ELPH-MCNC: 3.4 G/DL — SIGNIFICANT CHANGE UP (ref 3.3–5)
ALP SERPL-CCNC: 93 U/L — SIGNIFICANT CHANGE UP (ref 40–120)
ALT FLD-CCNC: <5 U/L — LOW (ref 10–45)
ANION GAP SERPL CALC-SCNC: 17 MMOL/L — SIGNIFICANT CHANGE UP (ref 5–17)
APTT BLD: 21.7 SEC — LOW (ref 24.5–35.6)
AST SERPL-CCNC: 8 U/L — LOW (ref 10–40)
BASOPHILS # BLD AUTO: 0.02 K/UL — SIGNIFICANT CHANGE UP (ref 0–0.2)
BASOPHILS NFR BLD AUTO: 0.8 % — SIGNIFICANT CHANGE UP (ref 0–2)
BILIRUB SERPL-MCNC: 0.6 MG/DL — SIGNIFICANT CHANGE UP (ref 0.2–1.2)
BUN SERPL-MCNC: 53 MG/DL — HIGH (ref 7–23)
CALCIUM SERPL-MCNC: 9.2 MG/DL — SIGNIFICANT CHANGE UP (ref 8.4–10.5)
CHLORIDE SERPL-SCNC: 101 MMOL/L — SIGNIFICANT CHANGE UP (ref 96–108)
CO2 SERPL-SCNC: 17 MMOL/L — LOW (ref 22–31)
CREAT SERPL-MCNC: 6.29 MG/DL — HIGH (ref 0.5–1.3)
EGFR: 9 ML/MIN/1.73M2 — LOW
EGFR: 9 ML/MIN/1.73M2 — LOW
EOSINOPHIL # BLD AUTO: 0.08 K/UL — SIGNIFICANT CHANGE UP (ref 0–0.5)
EOSINOPHIL NFR BLD AUTO: 2.6 % — SIGNIFICANT CHANGE UP (ref 0–6)
FERRITIN SERPL-MCNC: 913 NG/ML — HIGH (ref 30–400)
FOLATE SERPL-MCNC: 10.3 NG/ML — SIGNIFICANT CHANGE UP
GLUCOSE BLDC GLUCOMTR-MCNC: 133 MG/DL — HIGH (ref 70–99)
GLUCOSE BLDC GLUCOMTR-MCNC: 140 MG/DL — HIGH (ref 70–99)
GLUCOSE BLDC GLUCOMTR-MCNC: 194 MG/DL — HIGH (ref 70–99)
GLUCOSE BLDC GLUCOMTR-MCNC: 204 MG/DL — HIGH (ref 70–99)
GLUCOSE BLDC GLUCOMTR-MCNC: 220 MG/DL — HIGH (ref 70–99)
GLUCOSE SERPL-MCNC: 167 MG/DL — HIGH (ref 70–99)
HAPTOGLOB SERPL-MCNC: 196 MG/DL — SIGNIFICANT CHANGE UP (ref 34–200)
HCT VFR BLD CALC: 27.6 % — LOW (ref 39–50)
HGB BLD-MCNC: 9 G/DL — LOW (ref 13–17)
INR BLD: 0.98 RATIO — SIGNIFICANT CHANGE UP (ref 0.85–1.16)
IRON SATN MFR SERPL: 24 % — SIGNIFICANT CHANGE UP (ref 16–55)
IRON SATN MFR SERPL: 48 UG/DL — SIGNIFICANT CHANGE UP (ref 45–165)
LDH SERPL L TO P-CCNC: 177 U/L — SIGNIFICANT CHANGE UP (ref 50–242)
LYMPHOCYTES # BLD AUTO: 0.11 K/UL — LOW (ref 1–3.3)
LYMPHOCYTES # BLD AUTO: 3.4 % — LOW (ref 13–44)
MAGNESIUM SERPL-MCNC: 2 MG/DL — SIGNIFICANT CHANGE UP (ref 1.6–2.6)
MANUAL SMEAR VERIFICATION: SIGNIFICANT CHANGE UP
MCHC RBC-ENTMCNC: 30 PG — SIGNIFICANT CHANGE UP (ref 27–34)
MCHC RBC-ENTMCNC: 32.6 G/DL — SIGNIFICANT CHANGE UP (ref 32–36)
MCV RBC AUTO: 92 FL — SIGNIFICANT CHANGE UP (ref 80–100)
MONOCYTES # BLD AUTO: 0.08 K/UL — SIGNIFICANT CHANGE UP (ref 0–0.9)
MONOCYTES NFR BLD AUTO: 2.6 % — SIGNIFICANT CHANGE UP (ref 2–14)
NEUTROPHILS # BLD AUTO: 2.82 K/UL — SIGNIFICANT CHANGE UP (ref 1.8–7.4)
NEUTROPHILS NFR BLD AUTO: 90.6 % — HIGH (ref 43–77)
PHOSPHATE SERPL-MCNC: 4.4 MG/DL — SIGNIFICANT CHANGE UP (ref 2.5–4.5)
PLAT MORPH BLD: NORMAL — SIGNIFICANT CHANGE UP
PLATELET # BLD AUTO: 122 K/UL — LOW (ref 150–400)
POTASSIUM SERPL-MCNC: 4.6 MMOL/L — SIGNIFICANT CHANGE UP (ref 3.5–5.3)
POTASSIUM SERPL-SCNC: 4.6 MMOL/L — SIGNIFICANT CHANGE UP (ref 3.5–5.3)
PROT SERPL-MCNC: 6.5 G/DL — SIGNIFICANT CHANGE UP (ref 6–8.3)
PROTHROM AB SERPL-ACNC: 11.3 SEC — SIGNIFICANT CHANGE UP (ref 9.9–13.4)
RBC # BLD: 3 M/UL — LOW (ref 4.2–5.8)
RBC # FLD: 15 % — HIGH (ref 10.3–14.5)
RBC BLD AUTO: SIGNIFICANT CHANGE UP
SODIUM SERPL-SCNC: 135 MMOL/L — SIGNIFICANT CHANGE UP (ref 135–145)
TACROLIMUS SERPL-MCNC: 8.2 NG/ML — SIGNIFICANT CHANGE UP
TIBC SERPL-MCNC: 200 UG/DL — LOW (ref 220–430)
UIBC SERPL-MCNC: 152 UG/DL — SIGNIFICANT CHANGE UP (ref 110–370)
VIT B12 SERPL-MCNC: 673 PG/ML — SIGNIFICANT CHANGE UP (ref 232–1245)
WBC # BLD: 3.11 K/UL — LOW (ref 3.8–10.5)
WBC # FLD AUTO: 3.11 K/UL — LOW (ref 3.8–10.5)

## 2025-03-19 PROCEDURE — 88313 SPECIAL STAINS GROUP 2: CPT | Mod: 26

## 2025-03-19 PROCEDURE — 88305 TISSUE EXAM BY PATHOLOGIST: CPT | Mod: 26

## 2025-03-19 PROCEDURE — 88350 IMFLUOR EA ADDL 1ANTB STN PX: CPT | Mod: 26

## 2025-03-19 PROCEDURE — 88348 ELECTRON MICROSCOPY DX: CPT | Mod: 26

## 2025-03-19 PROCEDURE — 88346 IMFLUOR 1ST 1ANTB STAIN PX: CPT | Mod: 26

## 2025-03-19 PROCEDURE — 50200 RENAL BIOPSY PERQ: CPT | Mod: RT

## 2025-03-19 PROCEDURE — 76942 ECHO GUIDE FOR BIOPSY: CPT | Mod: 26

## 2025-03-19 RX ORDER — INSULIN LISPRO 100 U/ML
INJECTION, SOLUTION INTRAVENOUS; SUBCUTANEOUS EVERY 6 HOURS
Refills: 0 | Status: DISCONTINUED | OUTPATIENT
Start: 2025-03-19 | End: 2025-03-19

## 2025-03-19 RX ORDER — TRAMADOL HYDROCHLORIDE 50 MG/1
50 TABLET, FILM COATED ORAL EVERY 8 HOURS
Refills: 0 | Status: DISCONTINUED | OUTPATIENT
Start: 2025-03-19 | End: 2025-03-22

## 2025-03-19 RX ORDER — REPAGLINIDE 1 MG/1
0.5 TABLET ORAL
Refills: 0 | Status: DISCONTINUED | OUTPATIENT
Start: 2025-03-19 | End: 2025-03-22

## 2025-03-19 RX ORDER — LINAGLIPTIN 5 MG/1
5 TABLET, FILM COATED ORAL DAILY
Refills: 0 | Status: DISCONTINUED | OUTPATIENT
Start: 2025-03-19 | End: 2025-03-22

## 2025-03-19 RX ORDER — INSULIN LISPRO 100 U/ML
INJECTION, SOLUTION INTRAVENOUS; SUBCUTANEOUS
Refills: 0 | Status: DISCONTINUED | OUTPATIENT
Start: 2025-03-19 | End: 2025-03-22

## 2025-03-19 RX ADMIN — TRAMADOL HYDROCHLORIDE 25 MILLIGRAM(S): 50 TABLET, FILM COATED ORAL at 14:41

## 2025-03-19 RX ADMIN — LINAGLIPTIN 5 MILLIGRAM(S): 5 TABLET, FILM COATED ORAL at 13:15

## 2025-03-19 RX ADMIN — TRAMADOL HYDROCHLORIDE 25 MILLIGRAM(S): 50 TABLET, FILM COATED ORAL at 00:27

## 2025-03-19 RX ADMIN — TRAMADOL HYDROCHLORIDE 25 MILLIGRAM(S): 50 TABLET, FILM COATED ORAL at 01:00

## 2025-03-19 RX ADMIN — TRAMADOL HYDROCHLORIDE 50 MILLIGRAM(S): 50 TABLET, FILM COATED ORAL at 16:11

## 2025-03-19 RX ADMIN — Medication 20 MILLIGRAM(S): at 13:15

## 2025-03-19 RX ADMIN — TAMSULOSIN HYDROCHLORIDE 0.4 MILLIGRAM(S): 0.4 CAPSULE ORAL at 21:20

## 2025-03-19 RX ADMIN — VALGANCICLOVIR 450 MILLIGRAM(S): 450 TABLET, FILM COATED ORAL at 13:15

## 2025-03-19 RX ADMIN — Medication 30 MILLIGRAM(S): at 06:09

## 2025-03-19 RX ADMIN — MYCOPHENOLATE MOFETIL 500 MILLIGRAM(S): 500 TABLET, FILM COATED ORAL at 08:35

## 2025-03-19 RX ADMIN — MYCOPHENOLATE MOFETIL 500 MILLIGRAM(S): 500 TABLET, FILM COATED ORAL at 21:17

## 2025-03-19 RX ADMIN — INSULIN LISPRO 1: 100 INJECTION, SOLUTION INTRAVENOUS; SUBCUTANEOUS at 17:14

## 2025-03-19 RX ADMIN — TACROLIMUS 8 MILLIGRAM(S): 0.5 CAPSULE ORAL at 08:35

## 2025-03-19 RX ADMIN — Medication 650 MILLIGRAM(S): at 07:00

## 2025-03-19 RX ADMIN — Medication 1 APPLICATION(S): at 13:23

## 2025-03-19 RX ADMIN — TRAMADOL HYDROCHLORIDE 25 MILLIGRAM(S): 50 TABLET, FILM COATED ORAL at 13:15

## 2025-03-19 RX ADMIN — CARVEDILOL 12.5 MILLIGRAM(S): 3.12 TABLET, FILM COATED ORAL at 06:09

## 2025-03-19 RX ADMIN — CARVEDILOL 12.5 MILLIGRAM(S): 3.12 TABLET, FILM COATED ORAL at 17:14

## 2025-03-19 RX ADMIN — TRAMADOL HYDROCHLORIDE 50 MILLIGRAM(S): 50 TABLET, FILM COATED ORAL at 17:11

## 2025-03-19 RX ADMIN — Medication 650 MILLIGRAM(S): at 08:04

## 2025-03-19 RX ADMIN — Medication 1 TABLET(S): at 13:15

## 2025-03-20 LAB
ALBUMIN SERPL ELPH-MCNC: 3.4 G/DL — SIGNIFICANT CHANGE UP (ref 3.3–5)
ALP SERPL-CCNC: 89 U/L — SIGNIFICANT CHANGE UP (ref 40–120)
ALT FLD-CCNC: <5 U/L — LOW (ref 10–45)
ANION GAP SERPL CALC-SCNC: 12 MMOL/L — SIGNIFICANT CHANGE UP (ref 5–17)
AST SERPL-CCNC: 9 U/L — LOW (ref 10–40)
BASOPHILS # BLD AUTO: 0.02 K/UL — SIGNIFICANT CHANGE UP (ref 0–0.2)
BASOPHILS NFR BLD AUTO: 0.6 % — SIGNIFICANT CHANGE UP (ref 0–2)
BILIRUB SERPL-MCNC: 0.4 MG/DL — SIGNIFICANT CHANGE UP (ref 0.2–1.2)
BLD GP AB SCN SERPL QL: NEGATIVE — SIGNIFICANT CHANGE UP
BUN SERPL-MCNC: 55 MG/DL — HIGH (ref 7–23)
CALCIUM SERPL-MCNC: 9 MG/DL — SIGNIFICANT CHANGE UP (ref 8.4–10.5)
CHLORIDE SERPL-SCNC: 103 MMOL/L — SIGNIFICANT CHANGE UP (ref 96–108)
CO2 SERPL-SCNC: 22 MMOL/L — SIGNIFICANT CHANGE UP (ref 22–31)
CREAT SERPL-MCNC: 6.46 MG/DL — HIGH (ref 0.5–1.3)
EGFR: 9 ML/MIN/1.73M2 — LOW
EGFR: 9 ML/MIN/1.73M2 — LOW
EOSINOPHIL # BLD AUTO: 0.07 K/UL — SIGNIFICANT CHANGE UP (ref 0–0.5)
EOSINOPHIL NFR BLD AUTO: 2 % — SIGNIFICANT CHANGE UP (ref 0–6)
GLUCOSE BLDC GLUCOMTR-MCNC: 113 MG/DL — HIGH (ref 70–99)
GLUCOSE BLDC GLUCOMTR-MCNC: 133 MG/DL — HIGH (ref 70–99)
GLUCOSE BLDC GLUCOMTR-MCNC: 134 MG/DL — HIGH (ref 70–99)
GLUCOSE BLDC GLUCOMTR-MCNC: 139 MG/DL — HIGH (ref 70–99)
GLUCOSE SERPL-MCNC: 131 MG/DL — HIGH (ref 70–99)
HCT VFR BLD CALC: 28.2 % — LOW (ref 39–50)
HGB BLD-MCNC: 9 G/DL — LOW (ref 13–17)
IMM GRANULOCYTES NFR BLD AUTO: 0.6 % — SIGNIFICANT CHANGE UP (ref 0–0.9)
LYMPHOCYTES # BLD AUTO: 0.17 K/UL — LOW (ref 1–3.3)
LYMPHOCYTES # BLD AUTO: 4.9 % — LOW (ref 13–44)
MAGNESIUM SERPL-MCNC: 2 MG/DL — SIGNIFICANT CHANGE UP (ref 1.6–2.6)
MCHC RBC-ENTMCNC: 30.1 PG — SIGNIFICANT CHANGE UP (ref 27–34)
MCHC RBC-ENTMCNC: 31.9 G/DL — LOW (ref 32–36)
MCV RBC AUTO: 94.3 FL — SIGNIFICANT CHANGE UP (ref 80–100)
MONOCYTES # BLD AUTO: 0.12 K/UL — SIGNIFICANT CHANGE UP (ref 0–0.9)
MONOCYTES NFR BLD AUTO: 3.5 % — SIGNIFICANT CHANGE UP (ref 2–14)
NEUTROPHILS # BLD AUTO: 3.04 K/UL — SIGNIFICANT CHANGE UP (ref 1.8–7.4)
NEUTROPHILS NFR BLD AUTO: 88.4 % — HIGH (ref 43–77)
NRBC BLD AUTO-RTO: 0 /100 WBCS — SIGNIFICANT CHANGE UP (ref 0–0)
PHOSPHATE SERPL-MCNC: 4.5 MG/DL — SIGNIFICANT CHANGE UP (ref 2.5–4.5)
PLATELET # BLD AUTO: 130 K/UL — LOW (ref 150–400)
POTASSIUM SERPL-MCNC: 5.4 MMOL/L — HIGH (ref 3.5–5.3)
POTASSIUM SERPL-SCNC: 5.4 MMOL/L — HIGH (ref 3.5–5.3)
PROT SERPL-MCNC: 6.5 G/DL — SIGNIFICANT CHANGE UP (ref 6–8.3)
RBC # BLD: 2.99 M/UL — LOW (ref 4.2–5.8)
RBC # FLD: 14.6 % — HIGH (ref 10.3–14.5)
RH IG SCN BLD-IMP: POSITIVE — SIGNIFICANT CHANGE UP
SODIUM SERPL-SCNC: 137 MMOL/L — SIGNIFICANT CHANGE UP (ref 135–145)
TACROLIMUS SERPL-MCNC: 7.5 NG/ML — SIGNIFICANT CHANGE UP
WBC # BLD: 3.44 K/UL — LOW (ref 3.8–10.5)
WBC # FLD AUTO: 3.44 K/UL — LOW (ref 3.8–10.5)

## 2025-03-20 PROCEDURE — 76776 US EXAM K TRANSPL W/DOPPLER: CPT | Mod: 26,RT

## 2025-03-20 PROCEDURE — 99232 SBSQ HOSP IP/OBS MODERATE 35: CPT | Mod: GC

## 2025-03-20 RX ORDER — SODIUM ZIRCONIUM CYCLOSILICATE 5 G/5G
10 POWDER, FOR SUSPENSION ORAL ONCE
Refills: 0 | Status: COMPLETED | OUTPATIENT
Start: 2025-03-20 | End: 2025-03-20

## 2025-03-20 RX ADMIN — LINAGLIPTIN 5 MILLIGRAM(S): 5 TABLET, FILM COATED ORAL at 11:17

## 2025-03-20 RX ADMIN — MYCOPHENOLATE MOFETIL 500 MILLIGRAM(S): 500 TABLET, FILM COATED ORAL at 08:23

## 2025-03-20 RX ADMIN — REPAGLINIDE 0.5 MILLIGRAM(S): 1 TABLET ORAL at 11:17

## 2025-03-20 RX ADMIN — Medication 20 MILLIGRAM(S): at 11:17

## 2025-03-20 RX ADMIN — GABAPENTIN 300 MILLIGRAM(S): 400 CAPSULE ORAL at 21:06

## 2025-03-20 RX ADMIN — REPAGLINIDE 0.5 MILLIGRAM(S): 1 TABLET ORAL at 05:50

## 2025-03-20 RX ADMIN — CARVEDILOL 12.5 MILLIGRAM(S): 3.12 TABLET, FILM COATED ORAL at 18:32

## 2025-03-20 RX ADMIN — CARVEDILOL 12.5 MILLIGRAM(S): 3.12 TABLET, FILM COATED ORAL at 05:49

## 2025-03-20 RX ADMIN — TRAMADOL HYDROCHLORIDE 50 MILLIGRAM(S): 50 TABLET, FILM COATED ORAL at 04:16

## 2025-03-20 RX ADMIN — TRAMADOL HYDROCHLORIDE 50 MILLIGRAM(S): 50 TABLET, FILM COATED ORAL at 00:09

## 2025-03-20 RX ADMIN — Medication 1 APPLICATION(S): at 11:20

## 2025-03-20 RX ADMIN — TAMSULOSIN HYDROCHLORIDE 0.4 MILLIGRAM(S): 0.4 CAPSULE ORAL at 21:07

## 2025-03-20 RX ADMIN — Medication 30 MILLIGRAM(S): at 05:49

## 2025-03-20 RX ADMIN — MYCOPHENOLATE MOFETIL 500 MILLIGRAM(S): 500 TABLET, FILM COATED ORAL at 20:43

## 2025-03-20 RX ADMIN — SODIUM ZIRCONIUM CYCLOSILICATE 10 GRAM(S): 5 POWDER, FOR SUSPENSION ORAL at 11:17

## 2025-03-20 RX ADMIN — REPAGLINIDE 0.5 MILLIGRAM(S): 1 TABLET ORAL at 17:00

## 2025-03-21 ENCOUNTER — TRANSCRIPTION ENCOUNTER (OUTPATIENT)
Age: 70
End: 2025-03-21

## 2025-03-21 LAB
ALBUMIN SERPL ELPH-MCNC: 3.4 G/DL — SIGNIFICANT CHANGE UP (ref 3.3–5)
ALP SERPL-CCNC: 87 U/L — SIGNIFICANT CHANGE UP (ref 40–120)
ALT FLD-CCNC: <5 U/L — LOW (ref 10–45)
ANION GAP SERPL CALC-SCNC: 13 MMOL/L — SIGNIFICANT CHANGE UP (ref 5–17)
AST SERPL-CCNC: 9 U/L — LOW (ref 10–40)
BASOPHILS # BLD AUTO: 0.01 K/UL — SIGNIFICANT CHANGE UP (ref 0–0.2)
BASOPHILS NFR BLD AUTO: 0.3 % — SIGNIFICANT CHANGE UP (ref 0–2)
BILIRUB SERPL-MCNC: 0.4 MG/DL — SIGNIFICANT CHANGE UP (ref 0.2–1.2)
BUN SERPL-MCNC: 54 MG/DL — HIGH (ref 7–23)
CALCIUM SERPL-MCNC: 9 MG/DL — SIGNIFICANT CHANGE UP (ref 8.4–10.5)
CHLORIDE SERPL-SCNC: 102 MMOL/L — SIGNIFICANT CHANGE UP (ref 96–108)
CO2 SERPL-SCNC: 22 MMOL/L — SIGNIFICANT CHANGE UP (ref 22–31)
CREAT SERPL-MCNC: 6.24 MG/DL — HIGH (ref 0.5–1.3)
EGFR: 9 ML/MIN/1.73M2 — LOW
EGFR: 9 ML/MIN/1.73M2 — LOW
EOSINOPHIL # BLD AUTO: 0.08 K/UL — SIGNIFICANT CHANGE UP (ref 0–0.5)
EOSINOPHIL NFR BLD AUTO: 2.6 % — SIGNIFICANT CHANGE UP (ref 0–6)
GLUCOSE BLDC GLUCOMTR-MCNC: 127 MG/DL — HIGH (ref 70–99)
GLUCOSE BLDC GLUCOMTR-MCNC: 132 MG/DL — HIGH (ref 70–99)
GLUCOSE BLDC GLUCOMTR-MCNC: 159 MG/DL — HIGH (ref 70–99)
GLUCOSE BLDC GLUCOMTR-MCNC: 221 MG/DL — HIGH (ref 70–99)
GLUCOSE SERPL-MCNC: 88 MG/DL — SIGNIFICANT CHANGE UP (ref 70–99)
HCT VFR BLD CALC: 25.3 % — LOW (ref 39–50)
HGB BLD-MCNC: 8.1 G/DL — LOW (ref 13–17)
LYMPHOCYTES # BLD AUTO: 0.15 K/UL — LOW (ref 1–3.3)
LYMPHOCYTES # BLD AUTO: 4.9 % — LOW (ref 13–44)
MAGNESIUM SERPL-MCNC: 2 MG/DL — SIGNIFICANT CHANGE UP (ref 1.6–2.6)
MCHC RBC-ENTMCNC: 29.7 PG — SIGNIFICANT CHANGE UP (ref 27–34)
MCHC RBC-ENTMCNC: 32 G/DL — SIGNIFICANT CHANGE UP (ref 32–36)
MCV RBC AUTO: 92.7 FL — SIGNIFICANT CHANGE UP (ref 80–100)
MONOCYTES # BLD AUTO: 0.15 K/UL — SIGNIFICANT CHANGE UP (ref 0–0.9)
MONOCYTES NFR BLD AUTO: 4.9 % — SIGNIFICANT CHANGE UP (ref 2–14)
NEUTROPHILS # BLD AUTO: 2.7 K/UL — SIGNIFICANT CHANGE UP (ref 1.8–7.4)
NEUTROPHILS NFR BLD AUTO: 87.3 % — HIGH (ref 43–77)
NRBC BLD AUTO-RTO: 0 /100 WBCS — SIGNIFICANT CHANGE UP (ref 0–0)
PHOSPHATE SERPL-MCNC: 3.6 MG/DL — SIGNIFICANT CHANGE UP (ref 2.5–4.5)
PLATELET # BLD AUTO: 128 K/UL — LOW (ref 150–400)
POTASSIUM SERPL-MCNC: 5 MMOL/L — SIGNIFICANT CHANGE UP (ref 3.5–5.3)
POTASSIUM SERPL-SCNC: 5 MMOL/L — SIGNIFICANT CHANGE UP (ref 3.5–5.3)
PROT SERPL-MCNC: 6.2 G/DL — SIGNIFICANT CHANGE UP (ref 6–8.3)
RBC # BLD: 2.73 M/UL — LOW (ref 4.2–5.8)
RBC # FLD: 14.3 % — SIGNIFICANT CHANGE UP (ref 10.3–14.5)
SODIUM SERPL-SCNC: 137 MMOL/L — SIGNIFICANT CHANGE UP (ref 135–145)
SURGICAL PATHOLOGY STUDY: SIGNIFICANT CHANGE UP
TACROLIMUS SERPL-MCNC: 3.5 NG/ML — SIGNIFICANT CHANGE UP
WBC # BLD: 3.09 K/UL — LOW (ref 3.8–10.5)
WBC # FLD AUTO: 3.09 K/UL — LOW (ref 3.8–10.5)

## 2025-03-21 PROCEDURE — 99231 SBSQ HOSP IP/OBS SF/LOW 25: CPT

## 2025-03-21 PROCEDURE — 99232 SBSQ HOSP IP/OBS MODERATE 35: CPT | Mod: GC

## 2025-03-21 PROCEDURE — 74176 CT ABD & PELVIS W/O CONTRAST: CPT | Mod: 26

## 2025-03-21 PROCEDURE — 99232 SBSQ HOSP IP/OBS MODERATE 35: CPT

## 2025-03-21 RX ORDER — BELATACEPT 250 MG/1
250 INJECTION, POWDER, LYOPHILIZED, FOR SOLUTION INTRAVENOUS
Qty: 1 | Refills: 11 | Status: ACTIVE | OUTPATIENT
Start: 2025-03-20

## 2025-03-21 RX ORDER — POLYETHYLENE GLYCOL 3350 17 G/17G
17 POWDER, FOR SOLUTION ORAL
Refills: 0 | Status: DISCONTINUED | OUTPATIENT
Start: 2025-03-21 | End: 2025-03-22

## 2025-03-21 RX ORDER — SENNA 187 MG
2 TABLET ORAL AT BEDTIME
Refills: 0 | Status: DISCONTINUED | OUTPATIENT
Start: 2025-03-21 | End: 2025-03-22

## 2025-03-21 RX ORDER — BISACODYL 5 MG
10 TABLET, DELAYED RELEASE (ENTERIC COATED) ORAL DAILY
Refills: 0 | Status: DISCONTINUED | OUTPATIENT
Start: 2025-03-21 | End: 2025-03-22

## 2025-03-21 RX ORDER — TACROLIMUS 0.5 MG/1
4 CAPSULE ORAL ONCE
Refills: 0 | Status: COMPLETED | OUTPATIENT
Start: 2025-03-21 | End: 2025-03-21

## 2025-03-21 RX ORDER — POLYETHYLENE GLYCOL 3350 17 G/17G
17 POWDER, FOR SOLUTION ORAL ONCE
Refills: 0 | Status: COMPLETED | OUTPATIENT
Start: 2025-03-21 | End: 2025-03-21

## 2025-03-21 RX ORDER — TACROLIMUS 0.5 MG/1
4 CAPSULE ORAL
Refills: 0 | Status: DISCONTINUED | OUTPATIENT
Start: 2025-03-22 | End: 2025-03-22

## 2025-03-21 RX ORDER — POLYETHYLENE GLYCOL 3350 17 G/17G
17 POWDER, FOR SOLUTION ORAL DAILY
Refills: 0 | Status: DISCONTINUED | OUTPATIENT
Start: 2025-03-21 | End: 2025-03-21

## 2025-03-21 RX ADMIN — MYCOPHENOLATE MOFETIL 500 MILLIGRAM(S): 500 TABLET, FILM COATED ORAL at 08:42

## 2025-03-21 RX ADMIN — TRAMADOL HYDROCHLORIDE 50 MILLIGRAM(S): 50 TABLET, FILM COATED ORAL at 20:30

## 2025-03-21 RX ADMIN — TAMSULOSIN HYDROCHLORIDE 0.4 MILLIGRAM(S): 0.4 CAPSULE ORAL at 21:34

## 2025-03-21 RX ADMIN — GABAPENTIN 300 MILLIGRAM(S): 400 CAPSULE ORAL at 21:34

## 2025-03-21 RX ADMIN — REPAGLINIDE 0.5 MILLIGRAM(S): 1 TABLET ORAL at 08:41

## 2025-03-21 RX ADMIN — POLYETHYLENE GLYCOL 3350 17 GRAM(S): 17 POWDER, FOR SOLUTION ORAL at 03:16

## 2025-03-21 RX ADMIN — Medication 1 APPLICATION(S): at 12:16

## 2025-03-21 RX ADMIN — LINAGLIPTIN 5 MILLIGRAM(S): 5 TABLET, FILM COATED ORAL at 12:16

## 2025-03-21 RX ADMIN — REPAGLINIDE 0.5 MILLIGRAM(S): 1 TABLET ORAL at 12:16

## 2025-03-21 RX ADMIN — TACROLIMUS 4 MILLIGRAM(S): 0.5 CAPSULE ORAL at 12:41

## 2025-03-21 RX ADMIN — Medication 20 MILLIGRAM(S): at 12:16

## 2025-03-21 RX ADMIN — Medication 2 TABLET(S): at 21:35

## 2025-03-21 RX ADMIN — Medication 10 MILLIGRAM(S): at 12:42

## 2025-03-21 RX ADMIN — INSULIN LISPRO 2: 100 INJECTION, SOLUTION INTRAVENOUS; SUBCUTANEOUS at 17:37

## 2025-03-21 RX ADMIN — MYCOPHENOLATE MOFETIL 500 MILLIGRAM(S): 500 TABLET, FILM COATED ORAL at 19:42

## 2025-03-21 RX ADMIN — TRAMADOL HYDROCHLORIDE 50 MILLIGRAM(S): 50 TABLET, FILM COATED ORAL at 19:42

## 2025-03-21 RX ADMIN — VALGANCICLOVIR 450 MILLIGRAM(S): 450 TABLET, FILM COATED ORAL at 12:16

## 2025-03-21 RX ADMIN — CARVEDILOL 12.5 MILLIGRAM(S): 3.12 TABLET, FILM COATED ORAL at 05:51

## 2025-03-21 RX ADMIN — REPAGLINIDE 0.5 MILLIGRAM(S): 1 TABLET ORAL at 17:37

## 2025-03-21 RX ADMIN — Medication 1 TABLET(S): at 12:15

## 2025-03-21 RX ADMIN — Medication 30 MILLIGRAM(S): at 05:52

## 2025-03-21 RX ADMIN — CARVEDILOL 12.5 MILLIGRAM(S): 3.12 TABLET, FILM COATED ORAL at 17:37

## 2025-03-22 VITALS
OXYGEN SATURATION: 100 % | DIASTOLIC BLOOD PRESSURE: 81 MMHG | SYSTOLIC BLOOD PRESSURE: 153 MMHG | HEART RATE: 60 BPM | TEMPERATURE: 98 F | RESPIRATION RATE: 18 BRPM

## 2025-03-22 LAB
ALBUMIN SERPL ELPH-MCNC: 3.5 G/DL — SIGNIFICANT CHANGE UP (ref 3.3–5)
ALP SERPL-CCNC: 92 U/L — SIGNIFICANT CHANGE UP (ref 40–120)
ALT FLD-CCNC: <5 U/L — LOW (ref 10–45)
ANION GAP SERPL CALC-SCNC: 10 MMOL/L — SIGNIFICANT CHANGE UP (ref 5–17)
AST SERPL-CCNC: 8 U/L — LOW (ref 10–40)
BASOPHILS # BLD AUTO: 0.01 K/UL — SIGNIFICANT CHANGE UP (ref 0–0.2)
BASOPHILS NFR BLD AUTO: 0.4 % — SIGNIFICANT CHANGE UP (ref 0–2)
BILIRUB SERPL-MCNC: 0.3 MG/DL — SIGNIFICANT CHANGE UP (ref 0.2–1.2)
BUN SERPL-MCNC: 50 MG/DL — HIGH (ref 7–23)
CALCIUM SERPL-MCNC: 9.3 MG/DL — SIGNIFICANT CHANGE UP (ref 8.4–10.5)
CHLORIDE SERPL-SCNC: 106 MMOL/L — SIGNIFICANT CHANGE UP (ref 96–108)
CO2 SERPL-SCNC: 21 MMOL/L — LOW (ref 22–31)
CREAT SERPL-MCNC: 5.67 MG/DL — HIGH (ref 0.5–1.3)
EGFR: 10 ML/MIN/1.73M2 — LOW
EGFR: 10 ML/MIN/1.73M2 — LOW
EOSINOPHIL # BLD AUTO: 0.09 K/UL — SIGNIFICANT CHANGE UP (ref 0–0.5)
EOSINOPHIL NFR BLD AUTO: 3.5 % — SIGNIFICANT CHANGE UP (ref 0–6)
GLUCOSE BLDC GLUCOMTR-MCNC: 100 MG/DL — HIGH (ref 70–99)
GLUCOSE BLDC GLUCOMTR-MCNC: 99 MG/DL — SIGNIFICANT CHANGE UP (ref 70–99)
GLUCOSE SERPL-MCNC: 141 MG/DL — HIGH (ref 70–99)
HCT VFR BLD CALC: 26.6 % — LOW (ref 39–50)
HGB BLD-MCNC: 8.4 G/DL — LOW (ref 13–17)
IMM GRANULOCYTES NFR BLD AUTO: 0.4 % — SIGNIFICANT CHANGE UP (ref 0–0.9)
LYMPHOCYTES # BLD AUTO: 0.13 K/UL — LOW (ref 1–3.3)
LYMPHOCYTES # BLD AUTO: 5 % — LOW (ref 13–44)
MAGNESIUM SERPL-MCNC: 1.8 MG/DL — SIGNIFICANT CHANGE UP (ref 1.6–2.6)
MCHC RBC-ENTMCNC: 29.8 PG — SIGNIFICANT CHANGE UP (ref 27–34)
MCHC RBC-ENTMCNC: 31.6 G/DL — LOW (ref 32–36)
MCV RBC AUTO: 94.3 FL — SIGNIFICANT CHANGE UP (ref 80–100)
MONOCYTES # BLD AUTO: 0.15 K/UL — SIGNIFICANT CHANGE UP (ref 0–0.9)
MONOCYTES NFR BLD AUTO: 5.8 % — SIGNIFICANT CHANGE UP (ref 2–14)
NEUTROPHILS # BLD AUTO: 2.2 K/UL — SIGNIFICANT CHANGE UP (ref 1.8–7.4)
NEUTROPHILS NFR BLD AUTO: 84.9 % — HIGH (ref 43–77)
NRBC BLD AUTO-RTO: 0 /100 WBCS — SIGNIFICANT CHANGE UP (ref 0–0)
PHOSPHATE SERPL-MCNC: 3.3 MG/DL — SIGNIFICANT CHANGE UP (ref 2.5–4.5)
PLATELET # BLD AUTO: 142 K/UL — LOW (ref 150–400)
POTASSIUM SERPL-MCNC: 5 MMOL/L — SIGNIFICANT CHANGE UP (ref 3.5–5.3)
POTASSIUM SERPL-SCNC: 5 MMOL/L — SIGNIFICANT CHANGE UP (ref 3.5–5.3)
PROT SERPL-MCNC: 6.3 G/DL — SIGNIFICANT CHANGE UP (ref 6–8.3)
RBC # BLD: 2.82 M/UL — LOW (ref 4.2–5.8)
RBC # FLD: 14.1 % — SIGNIFICANT CHANGE UP (ref 10.3–14.5)
SODIUM SERPL-SCNC: 137 MMOL/L — SIGNIFICANT CHANGE UP (ref 135–145)
TACROLIMUS SERPL-MCNC: 3.5 NG/ML — SIGNIFICANT CHANGE UP
WBC # BLD: 2.59 K/UL — LOW (ref 3.8–10.5)
WBC # FLD AUTO: 2.59 K/UL — LOW (ref 3.8–10.5)

## 2025-03-22 PROCEDURE — 87070 CULTURE OTHR SPECIMN AEROBIC: CPT

## 2025-03-22 PROCEDURE — 74176 CT ABD & PELVIS W/O CONTRAST: CPT | Mod: MC

## 2025-03-22 PROCEDURE — 83540 ASSAY OF IRON: CPT

## 2025-03-22 PROCEDURE — 83735 ASSAY OF MAGNESIUM: CPT

## 2025-03-22 PROCEDURE — 83036 HEMOGLOBIN GLYCOSYLATED A1C: CPT

## 2025-03-22 PROCEDURE — 36430 TRANSFUSION BLD/BLD COMPNT: CPT

## 2025-03-22 PROCEDURE — 50200 RENAL BIOPSY PERQ: CPT

## 2025-03-22 PROCEDURE — 82746 ASSAY OF FOLIC ACID SERUM: CPT

## 2025-03-22 PROCEDURE — 84132 ASSAY OF SERUM POTASSIUM: CPT

## 2025-03-22 PROCEDURE — 85025 COMPLETE CBC W/AUTO DIFF WBC: CPT

## 2025-03-22 PROCEDURE — 85730 THROMBOPLASTIN TIME PARTIAL: CPT

## 2025-03-22 PROCEDURE — 86900 BLOOD TYPING SEROLOGIC ABO: CPT

## 2025-03-22 PROCEDURE — 82330 ASSAY OF CALCIUM: CPT

## 2025-03-22 PROCEDURE — 84100 ASSAY OF PHOSPHORUS: CPT

## 2025-03-22 PROCEDURE — 85018 HEMOGLOBIN: CPT

## 2025-03-22 PROCEDURE — 82728 ASSAY OF FERRITIN: CPT

## 2025-03-22 PROCEDURE — 83880 ASSAY OF NATRIURETIC PEPTIDE: CPT

## 2025-03-22 PROCEDURE — 86901 BLOOD TYPING SEROLOGIC RH(D): CPT

## 2025-03-22 PROCEDURE — 85014 HEMATOCRIT: CPT

## 2025-03-22 PROCEDURE — 83605 ASSAY OF LACTIC ACID: CPT

## 2025-03-22 PROCEDURE — 80197 ASSAY OF TACROLIMUS: CPT

## 2025-03-22 PROCEDURE — 88350 IMFLUOR EA ADDL 1ANTB STN PX: CPT

## 2025-03-22 PROCEDURE — 88313 SPECIAL STAINS GROUP 2: CPT

## 2025-03-22 PROCEDURE — 97530 THERAPEUTIC ACTIVITIES: CPT

## 2025-03-22 PROCEDURE — 82947 ASSAY GLUCOSE BLOOD QUANT: CPT

## 2025-03-22 PROCEDURE — 87340 HEPATITIS B SURFACE AG IA: CPT

## 2025-03-22 PROCEDURE — 71045 X-RAY EXAM CHEST 1 VIEW: CPT

## 2025-03-22 PROCEDURE — 82010 KETONE BODYS QUAN: CPT

## 2025-03-22 PROCEDURE — 99232 SBSQ HOSP IP/OBS MODERATE 35: CPT

## 2025-03-22 PROCEDURE — 99261: CPT

## 2025-03-22 PROCEDURE — 88348 ELECTRON MICROSCOPY DX: CPT

## 2025-03-22 PROCEDURE — 86850 RBC ANTIBODY SCREEN: CPT

## 2025-03-22 PROCEDURE — 82962 GLUCOSE BLOOD TEST: CPT

## 2025-03-22 PROCEDURE — 87637 SARSCOV2&INF A&B&RSV AMP PRB: CPT

## 2025-03-22 PROCEDURE — 83010 ASSAY OF HAPTOGLOBIN QUANT: CPT

## 2025-03-22 PROCEDURE — 83615 LACTATE (LD) (LDH) ENZYME: CPT

## 2025-03-22 PROCEDURE — 82607 VITAMIN B-12: CPT

## 2025-03-22 PROCEDURE — 97116 GAIT TRAINING THERAPY: CPT

## 2025-03-22 PROCEDURE — 76942 ECHO GUIDE FOR BIOPSY: CPT

## 2025-03-22 PROCEDURE — 80053 COMPREHEN METABOLIC PANEL: CPT

## 2025-03-22 PROCEDURE — P9040: CPT

## 2025-03-22 PROCEDURE — 83550 IRON BINDING TEST: CPT

## 2025-03-22 PROCEDURE — 99285 EMERGENCY DEPT VISIT HI MDM: CPT

## 2025-03-22 PROCEDURE — 88305 TISSUE EXAM BY PATHOLOGIST: CPT

## 2025-03-22 PROCEDURE — 86923 COMPATIBILITY TEST ELECTRIC: CPT

## 2025-03-22 PROCEDURE — 84295 ASSAY OF SERUM SODIUM: CPT

## 2025-03-22 PROCEDURE — 76776 US EXAM K TRANSPL W/DOPPLER: CPT

## 2025-03-22 PROCEDURE — 86704 HEP B CORE ANTIBODY TOTAL: CPT

## 2025-03-22 PROCEDURE — 97161 PT EVAL LOW COMPLEX 20 MIN: CPT

## 2025-03-22 PROCEDURE — 82803 BLOOD GASES ANY COMBINATION: CPT

## 2025-03-22 PROCEDURE — 82435 ASSAY OF BLOOD CHLORIDE: CPT

## 2025-03-22 PROCEDURE — 85610 PROTHROMBIN TIME: CPT

## 2025-03-22 PROCEDURE — 88346 IMFLUOR 1ST 1ANTB STAIN PX: CPT

## 2025-03-22 RX ORDER — TAMSULOSIN HYDROCHLORIDE 0.4 MG/1
1 CAPSULE ORAL
Qty: 0 | Refills: 0 | DISCHARGE
Start: 2025-03-22

## 2025-03-22 RX ORDER — MYCOPHENOLATE MOFETIL 500 MG/1
500 TABLET, FILM COATED ORAL
Qty: 0 | Refills: 0 | DISCHARGE
Start: 2025-03-22

## 2025-03-22 RX ORDER — EPOETIN ALFA 10000 [IU]/ML
10000 SOLUTION INTRAVENOUS; SUBCUTANEOUS
Qty: 2 | Refills: 11
Start: 2025-03-22 | End: 2026-03-16

## 2025-03-22 RX ORDER — CARVEDILOL 3.12 MG/1
1 TABLET, FILM COATED ORAL
Qty: 0 | Refills: 0 | DISCHARGE
Start: 2025-03-22

## 2025-03-22 RX ORDER — MAGNESIUM OXIDE 400 MG
400 TABLET ORAL ONCE
Refills: 0 | Status: COMPLETED | OUTPATIENT
Start: 2025-03-22 | End: 2025-03-22

## 2025-03-22 RX ORDER — ISOPROPYL ALCOHOL, BENZOCAINE .7; .06 ML/ML; ML/ML
0 SWAB TOPICAL
Qty: 100 | Refills: 1
Start: 2025-03-22

## 2025-03-22 RX ORDER — TACROLIMUS 0.5 MG/1
1 CAPSULE ORAL
Qty: 0 | Refills: 0 | DISCHARGE
Start: 2025-03-22

## 2025-03-22 RX ORDER — SULFAMETHOXAZOLE/TRIMETHOPRIM 800-160 MG
1 TABLET ORAL
Qty: 0 | Refills: 0 | DISCHARGE
Start: 2025-03-22

## 2025-03-22 RX ORDER — LINAGLIPTIN 5 MG/1
1 TABLET, FILM COATED ORAL
Qty: 0 | Refills: 0 | DISCHARGE
Start: 2025-03-22

## 2025-03-22 RX ORDER — REPAGLINIDE 1 MG/1
1 TABLET ORAL
Qty: 90 | Refills: 11
Start: 2025-03-22 | End: 2026-03-16

## 2025-03-22 RX ORDER — NIFEDIPINE 30 MG
1 TABLET, EXTENDED RELEASE 24 HR ORAL
Qty: 30 | Refills: 11
Start: 2025-03-22 | End: 2026-03-16

## 2025-03-22 RX ORDER — LINAGLIPTIN 5 MG/1
1 TABLET, FILM COATED ORAL
Qty: 30 | Refills: 11
Start: 2025-03-22 | End: 2026-03-16

## 2025-03-22 RX ORDER — REPAGLINIDE 1 MG/1
1 TABLET ORAL
Qty: 0 | Refills: 0 | DISCHARGE
Start: 2025-03-22

## 2025-03-22 RX ORDER — SENNA 187 MG
2 TABLET ORAL
Qty: 0 | Refills: 0 | DISCHARGE
Start: 2025-03-22

## 2025-03-22 RX ORDER — REPAGLINIDE 1 MG/1
1 TABLET ORAL
Qty: 90 | Refills: 0
Start: 2025-03-22 | End: 2025-04-20

## 2025-03-22 RX ADMIN — Medication 400 MILLIGRAM(S): at 09:08

## 2025-03-22 RX ADMIN — LINAGLIPTIN 5 MILLIGRAM(S): 5 TABLET, FILM COATED ORAL at 11:50

## 2025-03-22 RX ADMIN — Medication 20 MILLIGRAM(S): at 11:50

## 2025-03-22 RX ADMIN — Medication 1 APPLICATION(S): at 11:52

## 2025-03-22 RX ADMIN — TACROLIMUS 4 MILLIGRAM(S): 0.5 CAPSULE ORAL at 09:08

## 2025-03-22 RX ADMIN — REPAGLINIDE 0.5 MILLIGRAM(S): 1 TABLET ORAL at 09:08

## 2025-03-22 RX ADMIN — REPAGLINIDE 0.5 MILLIGRAM(S): 1 TABLET ORAL at 11:50

## 2025-03-22 RX ADMIN — MYCOPHENOLATE MOFETIL 500 MILLIGRAM(S): 500 TABLET, FILM COATED ORAL at 09:08

## 2025-03-22 RX ADMIN — Medication 30 MILLIGRAM(S): at 06:16

## 2025-03-22 RX ADMIN — POLYETHYLENE GLYCOL 3350 17 GRAM(S): 17 POWDER, FOR SOLUTION ORAL at 06:15

## 2025-03-22 RX ADMIN — CARVEDILOL 12.5 MILLIGRAM(S): 3.12 TABLET, FILM COATED ORAL at 09:08

## 2025-03-24 ENCOUNTER — TRANSCRIPTION ENCOUNTER (OUTPATIENT)
Age: 70
End: 2025-03-24

## 2025-03-24 ENCOUNTER — NON-APPOINTMENT (OUTPATIENT)
Age: 70
End: 2025-03-24

## 2025-03-25 ENCOUNTER — NON-APPOINTMENT (OUTPATIENT)
Age: 70
End: 2025-03-25

## 2025-03-25 ENCOUNTER — LABORATORY RESULT (OUTPATIENT)
Age: 70
End: 2025-03-25

## 2025-03-25 LAB
ALBUMIN SERPL ELPH-MCNC: 4 G/DL
ALP BLD-CCNC: 111 U/L
ALT SERPL-CCNC: <5 U/L
ANION GAP SERPL CALC-SCNC: 11 MMOL/L
APPEARANCE: CLEAR
AST SERPL-CCNC: 8 U/L
BACTERIA: NEGATIVE /HPF
BASOPHILS # BLD AUTO: 0.03 K/UL
BASOPHILS NFR BLD AUTO: 1.2 %
BILIRUB SERPL-MCNC: 0.5 MG/DL
BILIRUBIN URINE: NEGATIVE
BLOOD URINE: ABNORMAL
BUN SERPL-MCNC: 43 MG/DL
CALCIUM SERPL-MCNC: 9.6 MG/DL
CAST: 1 /LPF
CHLORIDE SERPL-SCNC: 110 MMOL/L
CO2 SERPL-SCNC: 18 MMOL/L
COLOR: YELLOW
CREAT SERPL-MCNC: 4.8 MG/DL
EGFRCR SERPLBLD CKD-EPI 2021: 12 ML/MIN/1.73M2
EOSINOPHIL # BLD AUTO: 0.13 K/UL
EOSINOPHIL NFR BLD AUTO: 5.3 %
EPITHELIAL CELLS: 1 /HPF
GLUCOSE QUALITATIVE U: 100 MG/DL
GLUCOSE SERPL-MCNC: 158 MG/DL
HCT VFR BLD CALC: 30 %
HGB BLD-MCNC: 9.4 G/DL
IMM GRANULOCYTES NFR BLD AUTO: 0.8 %
KETONES URINE: NEGATIVE MG/DL
LEUKOCYTE ESTERASE URINE: ABNORMAL
LYMPHOCYTES # BLD AUTO: 0.11 K/UL
LYMPHOCYTES NFR BLD AUTO: 4.5 %
MAGNESIUM SERPL-MCNC: 1.6 MG/DL
MAN DIFF?: NORMAL
MCHC RBC-ENTMCNC: 30 PG
MCHC RBC-ENTMCNC: 31.3 G/DL
MCV RBC AUTO: 95.8 FL
MICROSCOPIC-UA: NORMAL
MONOCYTES # BLD AUTO: 0.09 K/UL
MONOCYTES NFR BLD AUTO: 3.7 %
NEUTROPHILS # BLD AUTO: 2.06 K/UL
NEUTROPHILS NFR BLD AUTO: 84.5 %
NITRITE URINE: NEGATIVE
PH URINE: 5.5
PHOSPHATE SERPL-MCNC: 2.6 MG/DL
PLATELET # BLD AUTO: 182 K/UL
POTASSIUM SERPL-SCNC: 5.8 MMOL/L
PROT SERPL-MCNC: 7.1 G/DL
PROTEIN URINE: 30 MG/DL
RBC # BLD: 3.13 M/UL
RBC # FLD: 14.4 %
RED BLOOD CELLS URINE: 18 /HPF
SODIUM SERPL-SCNC: 140 MMOL/L
SPECIFIC GRAVITY URINE: 1.02
TACROLIMUS SERPL-MCNC: 2.5 NG/ML
UROBILINOGEN URINE: 0.2 MG/DL
WBC # FLD AUTO: 2.44 K/UL
WHITE BLOOD CELLS URINE: 4 /HPF

## 2025-03-26 LAB
BKV DNA SPEC QL NAA+PROBE: NOT DETECTED IU/ML
CMV DNA SPEC QL NAA+PROBE: NOT DETECTED IU/ML
CMVPCR LOG: NOT DETECTED LOG10IU/ML
CREAT SPEC-SCNC: 178 MG/DL
CREAT/PROT UR: 0.3 RATIO
PROT UR-MCNC: 52 MG/DL

## 2025-03-27 ENCOUNTER — APPOINTMENT (OUTPATIENT)
Dept: NEPHROLOGY | Facility: CLINIC | Age: 70
End: 2025-03-27
Payer: MEDICARE

## 2025-03-27 VITALS
RESPIRATION RATE: 17 BRPM | SYSTOLIC BLOOD PRESSURE: 166 MMHG | HEIGHT: 69 IN | BODY MASS INDEX: 27.99 KG/M2 | OXYGEN SATURATION: 100 % | DIASTOLIC BLOOD PRESSURE: 74 MMHG | TEMPERATURE: 98.7 F | WEIGHT: 189 LBS | HEART RATE: 67 BPM

## 2025-03-27 VITALS — DIASTOLIC BLOOD PRESSURE: 80 MMHG | SYSTOLIC BLOOD PRESSURE: 140 MMHG

## 2025-03-27 PROCEDURE — 99215 OFFICE O/P EST HI 40 MIN: CPT

## 2025-04-01 ENCOUNTER — LABORATORY RESULT (OUTPATIENT)
Age: 70
End: 2025-04-01

## 2025-04-01 ENCOUNTER — TRANSCRIPTION ENCOUNTER (OUTPATIENT)
Age: 70
End: 2025-04-01

## 2025-04-01 ENCOUNTER — APPOINTMENT (OUTPATIENT)
Dept: NEPHROLOGY | Facility: CLINIC | Age: 70
End: 2025-04-01
Payer: MEDICARE

## 2025-04-01 VITALS
OXYGEN SATURATION: 96 % | HEIGHT: 69 IN | RESPIRATION RATE: 17 BRPM | HEART RATE: 103 BPM | WEIGHT: 192 LBS | TEMPERATURE: 98 F | DIASTOLIC BLOOD PRESSURE: 69 MMHG | SYSTOLIC BLOOD PRESSURE: 131 MMHG | BODY MASS INDEX: 28.44 KG/M2

## 2025-04-01 DIAGNOSIS — E11.9 TYPE 2 DIABETES MELLITUS W/OUT COMPLICATIONS: ICD-10-CM

## 2025-04-01 DIAGNOSIS — Z79.899 OTHER LONG TERM (CURRENT) DRUG THERAPY: ICD-10-CM

## 2025-04-01 DIAGNOSIS — I10 ESSENTIAL (PRIMARY) HYPERTENSION: ICD-10-CM

## 2025-04-01 DIAGNOSIS — Z94.0 OTHER LONG TERM (CURRENT) DRUG THERAPY: ICD-10-CM

## 2025-04-01 PROCEDURE — 99214 OFFICE O/P EST MOD 30 MIN: CPT

## 2025-04-02 LAB
ALBUMIN SERPL ELPH-MCNC: 4.3 G/DL
ALP BLD-CCNC: 154 U/L
ALT SERPL-CCNC: <5 U/L
ANION GAP SERPL CALC-SCNC: 10 MMOL/L
APPEARANCE: CLEAR
AST SERPL-CCNC: 8 U/L
BILIRUB SERPL-MCNC: 0.5 MG/DL
BILIRUBIN URINE: NEGATIVE
BLOOD URINE: ABNORMAL
BUN SERPL-MCNC: 31 MG/DL
CALCIUM SERPL-MCNC: 9 MG/DL
CHLORIDE SERPL-SCNC: 112 MMOL/L
CMV DNA SPEC QL NAA+PROBE: NOT DETECTED IU/ML
CMVPCR LOG: NOT DETECTED LOG10IU/ML
CO2 SERPL-SCNC: 19 MMOL/L
COLOR: YELLOW
CREAT SERPL-MCNC: 3.09 MG/DL
CREAT SPEC-SCNC: 147 MG/DL
CREAT/PROT UR: 0.2 RATIO
EGFRCR SERPLBLD CKD-EPI 2021: 21 ML/MIN/1.73M2
GLUCOSE QUALITATIVE U: 250 MG/DL
GLUCOSE SERPL-MCNC: 160 MG/DL
HBV SURFACE AG SER QL: NONREACTIVE
HCT VFR BLD CALC: 32.4 %
HCV AB SER QL: NONREACTIVE
HCV RNA SERPL NAA+PROBE-LOG IU: NOT DETECTED LOGIU/ML
HCV S/CO RATIO: 0.26 S/CO
HEPB DNA PCR INT: NOT DETECTED
HEPB DNA PCR LOG: NOT DETECTED LOGIU/ML
HEPC RNA INTERP: NOT DETECTED
HGB BLD-MCNC: 10 G/DL
HIV1 RNA # SERPL NAA+PROBE: NORMAL
HIV1 RNA # SERPL NAA+PROBE: NORMAL COPIES/ML
HIV1+2 AB SPEC QL IA.RAPID: NONREACTIVE
KETONES URINE: NEGATIVE MG/DL
LEUKOCYTE ESTERASE URINE: ABNORMAL
MAGNESIUM SERPL-MCNC: 1.6 MG/DL
MCHC RBC-ENTMCNC: 29.8 PG
MCHC RBC-ENTMCNC: 30.9 G/DL
MCV RBC AUTO: 96.4 FL
NITRITE URINE: NEGATIVE
PH URINE: 5.5
PHOSPHATE SERPL-MCNC: 2.6 MG/DL
PLATELET # BLD AUTO: 185 K/UL
POTASSIUM SERPL-SCNC: 5.7 MMOL/L
PROT SERPL-MCNC: 7 G/DL
PROT UR-MCNC: 32 MG/DL
PROTEIN URINE: 30 MG/DL
RBC # BLD: 3.36 M/UL
RBC # FLD: 14.6 %
SODIUM SERPL-SCNC: 141 MMOL/L
SPECIFIC GRAVITY URINE: 1.02
TACROLIMUS SERPL-MCNC: 4.6 NG/ML
UROBILINOGEN URINE: 0.2 MG/DL
VIRAL LOAD INTERP: NORMAL
VIRAL LOAD LOG: NORMAL LG COP/ML
WBC # FLD AUTO: 2.21 K/UL

## 2025-04-08 ENCOUNTER — APPOINTMENT (OUTPATIENT)
Dept: TRANSPLANT | Facility: CLINIC | Age: 70
End: 2025-04-08
Payer: MEDICARE

## 2025-04-08 PROCEDURE — 52310 CYSTOSCOPY AND TREATMENT: CPT | Mod: 58

## 2025-04-11 DIAGNOSIS — Z94.0 KIDNEY TRANSPLANT STATUS: ICD-10-CM

## 2025-04-15 ENCOUNTER — APPOINTMENT (OUTPATIENT)
Dept: TRANSPLANT | Facility: CLINIC | Age: 70
End: 2025-04-15

## 2025-04-15 ENCOUNTER — NON-APPOINTMENT (OUTPATIENT)
Age: 70
End: 2025-04-15

## 2025-04-15 LAB
ALBUMIN SERPL ELPH-MCNC: 4.6 G/DL
ALP BLD-CCNC: 162 U/L
ALT SERPL-CCNC: 5 U/L
ANION GAP SERPL CALC-SCNC: 10 MMOL/L
APPEARANCE: CLEAR
AST SERPL-CCNC: 13 U/L
BILIRUB SERPL-MCNC: 0.7 MG/DL
BILIRUBIN URINE: NEGATIVE
BLOOD URINE: NEGATIVE
BUN SERPL-MCNC: 28 MG/DL
CALCIUM SERPL-MCNC: 9.5 MG/DL
CHLORIDE SERPL-SCNC: 109 MMOL/L
CO2 SERPL-SCNC: 22 MMOL/L
COLOR: YELLOW
CREAT SERPL-MCNC: 2.5 MG/DL
CREAT SPEC-SCNC: 120 MG/DL
CREAT/PROT UR: 0.1 RATIO
EGFRCR SERPLBLD CKD-EPI 2021: 27 ML/MIN/1.73M2
GLUCOSE QUALITATIVE U: NEGATIVE MG/DL
GLUCOSE SERPL-MCNC: 116 MG/DL
HCT VFR BLD CALC: 36.8 %
HGB BLD-MCNC: 11.5 G/DL
INR PPP: 1.03 RATIO
KETONES URINE: NEGATIVE MG/DL
LEUKOCYTE ESTERASE URINE: NEGATIVE
MAGNESIUM SERPL-MCNC: 1.7 MG/DL
MCHC RBC-ENTMCNC: 29.3 PG
MCHC RBC-ENTMCNC: 31.3 G/DL
MCV RBC AUTO: 93.9 FL
NITRITE URINE: NEGATIVE
PH URINE: 5.5
PHOSPHATE SERPL-MCNC: 2 MG/DL
PLATELET # BLD AUTO: 148 K/UL
POTASSIUM SERPL-SCNC: 5.5 MMOL/L
PROT SERPL-MCNC: 7.5 G/DL
PROT UR-MCNC: 16 MG/DL
PROTEIN URINE: NORMAL MG/DL
PT BLD: 12.2 SEC
RBC # BLD: 3.92 M/UL
RBC # FLD: 14.8 %
SODIUM SERPL-SCNC: 141 MMOL/L
SPECIFIC GRAVITY URINE: 1.01
TACROLIMUS SERPL-MCNC: 6.8 NG/ML
UROBILINOGEN URINE: 0.2 MG/DL
WBC # FLD AUTO: 2.93 K/UL

## 2025-04-17 ENCOUNTER — OUTPATIENT (OUTPATIENT)
Dept: OUTPATIENT SERVICES | Facility: HOSPITAL | Age: 70
LOS: 1 days | End: 2025-04-17
Payer: MEDICARE

## 2025-04-17 ENCOUNTER — APPOINTMENT (OUTPATIENT)
Dept: ULTRASOUND IMAGING | Facility: HOSPITAL | Age: 70
End: 2025-04-17
Payer: MEDICARE

## 2025-04-17 DIAGNOSIS — Z98.890 OTHER SPECIFIED POSTPROCEDURAL STATES: Chronic | ICD-10-CM

## 2025-04-17 DIAGNOSIS — Z94.0 KIDNEY TRANSPLANT STATUS: ICD-10-CM

## 2025-04-17 PROCEDURE — 76776 US EXAM K TRANSPL W/DOPPLER: CPT

## 2025-04-17 PROCEDURE — 76776 US EXAM K TRANSPL W/DOPPLER: CPT | Mod: 26,RT

## 2025-04-21 RX ORDER — REPAGLINIDE 0.5 MG/1
0.5 TABLET ORAL 3 TIMES DAILY
Qty: 90 | Refills: 0 | Status: ACTIVE | COMMUNITY
Start: 2025-04-21 | End: 1900-01-01

## 2025-04-21 RX ORDER — LINAGLIPTIN 5 MG/1
5 TABLET, FILM COATED ORAL DAILY
Qty: 30 | Refills: 11 | Status: ACTIVE | COMMUNITY
Start: 2025-04-21 | End: 1900-01-01

## 2025-04-22 RX ORDER — NIFEDIPINE 30 MG/1
30 TABLET, EXTENDED RELEASE ORAL DAILY
Qty: 30 | Refills: 11 | Status: ACTIVE | COMMUNITY
Start: 2025-04-22 | End: 1900-01-01

## 2025-04-24 ENCOUNTER — LABORATORY RESULT (OUTPATIENT)
Age: 70
End: 2025-04-24

## 2025-04-24 ENCOUNTER — APPOINTMENT (OUTPATIENT)
Dept: NEPHROLOGY | Facility: CLINIC | Age: 70
End: 2025-04-24
Payer: MEDICARE

## 2025-04-24 VITALS — SYSTOLIC BLOOD PRESSURE: 140 MMHG | DIASTOLIC BLOOD PRESSURE: 80 MMHG

## 2025-04-24 VITALS
BODY MASS INDEX: 27.55 KG/M2 | WEIGHT: 186 LBS | HEIGHT: 69 IN | HEART RATE: 59 BPM | SYSTOLIC BLOOD PRESSURE: 187 MMHG | DIASTOLIC BLOOD PRESSURE: 83 MMHG | RESPIRATION RATE: 17 BRPM | OXYGEN SATURATION: 100 % | TEMPERATURE: 98.7 F

## 2025-04-24 VITALS — SYSTOLIC BLOOD PRESSURE: 170 MMHG | DIASTOLIC BLOOD PRESSURE: 94 MMHG

## 2025-04-24 DIAGNOSIS — Z94.0 KIDNEY TRANSPLANT STATUS: ICD-10-CM

## 2025-04-24 DIAGNOSIS — E11.9 TYPE 2 DIABETES MELLITUS W/OUT COMPLICATIONS: ICD-10-CM

## 2025-04-24 DIAGNOSIS — Z94.0 OTHER LONG TERM (CURRENT) DRUG THERAPY: ICD-10-CM

## 2025-04-24 DIAGNOSIS — I10 ESSENTIAL (PRIMARY) HYPERTENSION: ICD-10-CM

## 2025-04-24 DIAGNOSIS — Z79.899 OTHER LONG TERM (CURRENT) DRUG THERAPY: ICD-10-CM

## 2025-04-24 PROCEDURE — 99214 OFFICE O/P EST MOD 30 MIN: CPT

## 2025-04-25 LAB
ALBUMIN SERPL ELPH-MCNC: 4.5 G/DL
ANION GAP SERPL CALC-SCNC: 14 MMOL/L
APPEARANCE: CLEAR
BACTERIA: NEGATIVE /HPF
BASOPHILS # BLD AUTO: 0.03 K/UL
BASOPHILS NFR BLD AUTO: 0.8 %
BILIRUBIN URINE: NEGATIVE
BLOOD URINE: NEGATIVE
BUN SERPL-MCNC: 24 MG/DL
CALCIUM SERPL-MCNC: 9.7 MG/DL
CAST: 0 /LPF
CHLORIDE SERPL-SCNC: 109 MMOL/L
CO2 SERPL-SCNC: 17 MMOL/L
COLOR: YELLOW
CREAT SERPL-MCNC: 2.13 MG/DL
CREAT SPEC-SCNC: 94 MG/DL
CREAT SPEC-SCNC: 94 MG/DL
CREAT/PROT UR: 0.1 RATIO
EGFRCR SERPLBLD CKD-EPI 2021: 33 ML/MIN/1.73M2
EOSINOPHIL # BLD AUTO: 0.14 K/UL
EOSINOPHIL NFR BLD AUTO: 3.5 %
EPITHELIAL CELLS: 0 /HPF
GLUCOSE QUALITATIVE U: NEGATIVE MG/DL
GLUCOSE SERPL-MCNC: 180 MG/DL
HCT VFR BLD CALC: 36.2 %
HGB BLD-MCNC: 11.4 G/DL
IMM GRANULOCYTES NFR BLD AUTO: 0.3 %
INR PPP: 0.98 RATIO
KETONES URINE: NEGATIVE MG/DL
LEUKOCYTE ESTERASE URINE: NEGATIVE
LYMPHOCYTES # BLD AUTO: 0.16 K/UL
LYMPHOCYTES NFR BLD AUTO: 4 %
MAN DIFF?: NORMAL
MCHC RBC-ENTMCNC: 29.2 PG
MCHC RBC-ENTMCNC: 31.5 G/DL
MCV RBC AUTO: 92.8 FL
MICROALBUMIN 24H UR DL<=1MG/L-MCNC: 1.3 MG/DL
MICROALBUMIN/CREAT 24H UR-RTO: 13 MG/G
MICROSCOPIC-UA: NORMAL
MONOCYTES # BLD AUTO: 0.38 K/UL
MONOCYTES NFR BLD AUTO: 9.5 %
NEUTROPHILS # BLD AUTO: 3.26 K/UL
NEUTROPHILS NFR BLD AUTO: 81.9 %
NITRITE URINE: NEGATIVE
PH URINE: 6
PHOSPHATE SERPL-MCNC: 1.9 MG/DL
PLATELET # BLD AUTO: 93 K/UL
POTASSIUM SERPL-SCNC: 5.7 MMOL/L
PROT UR-MCNC: 13 MG/DL
PROTEIN URINE: NORMAL MG/DL
PT BLD: 11.7 SEC
RBC # BLD: 3.9 M/UL
RBC # FLD: 14.7 %
RED BLOOD CELLS URINE: 0 /HPF
SODIUM SERPL-SCNC: 140 MMOL/L
SPECIFIC GRAVITY URINE: 1.01
TACROLIMUS SERPL-MCNC: 5.2 NG/ML
UROBILINOGEN URINE: 0.2 MG/DL
WBC # FLD AUTO: 3.98 K/UL
WHITE BLOOD CELLS URINE: 1 /HPF

## 2025-05-08 ENCOUNTER — APPOINTMENT (OUTPATIENT)
Dept: NEPHROLOGY | Facility: CLINIC | Age: 70
End: 2025-05-08
Payer: MEDICARE

## 2025-05-08 ENCOUNTER — NON-APPOINTMENT (OUTPATIENT)
Age: 70
End: 2025-05-08

## 2025-05-08 VITALS
HEART RATE: 58 BPM | DIASTOLIC BLOOD PRESSURE: 78 MMHG | SYSTOLIC BLOOD PRESSURE: 175 MMHG | TEMPERATURE: 97.7 F | HEIGHT: 69 IN | WEIGHT: 192 LBS | BODY MASS INDEX: 28.44 KG/M2 | OXYGEN SATURATION: 98 %

## 2025-05-08 DIAGNOSIS — I10 ESSENTIAL (PRIMARY) HYPERTENSION: ICD-10-CM

## 2025-05-08 DIAGNOSIS — Z94.0 OTHER LONG TERM (CURRENT) DRUG THERAPY: ICD-10-CM

## 2025-05-08 DIAGNOSIS — Z79.899 OTHER LONG TERM (CURRENT) DRUG THERAPY: ICD-10-CM

## 2025-05-08 DIAGNOSIS — E11.9 TYPE 2 DIABETES MELLITUS W/OUT COMPLICATIONS: ICD-10-CM

## 2025-05-08 LAB
ALBUMIN SERPL ELPH-MCNC: 4.5 G/DL
ALP BLD-CCNC: 141 U/L
ALT SERPL-CCNC: 6 U/L
ANION GAP SERPL CALC-SCNC: 11 MMOL/L
APPEARANCE: CLEAR
AST SERPL-CCNC: 10 U/L
BACTERIA: NEGATIVE /HPF
BILIRUB SERPL-MCNC: 0.6 MG/DL
BILIRUBIN URINE: NEGATIVE
BLOOD URINE: NEGATIVE
BUN SERPL-MCNC: 27 MG/DL
CALCIUM SERPL-MCNC: 9.4 MG/DL
CAST: 0 /LPF
CHLORIDE SERPL-SCNC: 110 MMOL/L
CO2 SERPL-SCNC: 21 MMOL/L
COLOR: YELLOW
CREAT SERPL-MCNC: 2.25 MG/DL
CREAT SPEC-SCNC: 143 MG/DL
CREAT/PROT UR: 0.1 RATIO
EGFRCR SERPLBLD CKD-EPI 2021: 31 ML/MIN/1.73M2
EPITHELIAL CELLS: 0 /HPF
GLUCOSE QUALITATIVE U: NEGATIVE MG/DL
GLUCOSE SERPL-MCNC: 149 MG/DL
HCT VFR BLD CALC: 34.9 %
HGB BLD-MCNC: 10.8 G/DL
KETONES URINE: NEGATIVE MG/DL
LDH SERPL-CCNC: 141 U/L
LEUKOCYTE ESTERASE URINE: NEGATIVE
MAGNESIUM SERPL-MCNC: 1.9 MG/DL
MCHC RBC-ENTMCNC: 28.4 PG
MCHC RBC-ENTMCNC: 30.9 G/DL
MCV RBC AUTO: 91.8 FL
MICROSCOPIC-UA: NORMAL
NITRITE URINE: NEGATIVE
PH URINE: 5.5
PHOSPHATE SERPL-MCNC: 2 MG/DL
PLATELET # BLD AUTO: 124 K/UL
POTASSIUM SERPL-SCNC: 5.2 MMOL/L
PROT SERPL-MCNC: 7.2 G/DL
PROT UR-MCNC: 16 MG/DL
PROTEIN URINE: NORMAL MG/DL
RBC # BLD: 3.8 M/UL
RBC # FLD: 13.6 %
RED BLOOD CELLS URINE: 0 /HPF
SODIUM SERPL-SCNC: 142 MMOL/L
SPECIFIC GRAVITY URINE: 1.02
TACROLIMUS SERPL-MCNC: 6.5 NG/ML
URATE SERPL-MCNC: 7 MG/DL
UROBILINOGEN URINE: 0.2 MG/DL
WBC # FLD AUTO: 2.24 K/UL
WHITE BLOOD CELLS URINE: 0 /HPF

## 2025-05-08 PROCEDURE — 99214 OFFICE O/P EST MOD 30 MIN: CPT

## 2025-05-14 LAB
BKV DNA SPEC QL NAA+PROBE: NOT DETECTED IU/ML
CMV DNA SPEC QL NAA+PROBE: 103 IU/ML
CMVPCR LOG: 2.01 LOG10IU/ML

## 2025-05-15 ENCOUNTER — TRANSCRIPTION ENCOUNTER (OUTPATIENT)
Age: 70
End: 2025-05-15

## 2025-05-15 ENCOUNTER — APPOINTMENT (OUTPATIENT)
Dept: NEPHROLOGY | Facility: CLINIC | Age: 70
End: 2025-05-15

## 2025-05-15 ENCOUNTER — INPATIENT (INPATIENT)
Facility: HOSPITAL | Age: 70
LOS: 0 days | Discharge: HOME | DRG: 950 | End: 2025-05-15
Attending: TRANSPLANT SURGERY | Admitting: TRANSPLANT SURGERY
Payer: MEDICARE

## 2025-05-15 VITALS
OXYGEN SATURATION: 100 % | HEART RATE: 65 BPM | WEIGHT: 186.07 LBS | RESPIRATION RATE: 18 BRPM | TEMPERATURE: 97 F | DIASTOLIC BLOOD PRESSURE: 83 MMHG | SYSTOLIC BLOOD PRESSURE: 153 MMHG

## 2025-05-15 VITALS
TEMPERATURE: 98 F | RESPIRATION RATE: 18 BRPM | HEART RATE: 68 BPM | SYSTOLIC BLOOD PRESSURE: 163 MMHG | OXYGEN SATURATION: 100 % | DIASTOLIC BLOOD PRESSURE: 76 MMHG

## 2025-05-15 DIAGNOSIS — Z79.899 OTHER LONG TERM (CURRENT) DRUG THERAPY: ICD-10-CM

## 2025-05-15 DIAGNOSIS — Z98.890 OTHER SPECIFIED POSTPROCEDURAL STATES: Chronic | ICD-10-CM

## 2025-05-15 LAB
ALBUMIN SERPL ELPH-MCNC: 4.5 G/DL — SIGNIFICANT CHANGE UP (ref 3.3–5)
ALP SERPL-CCNC: 149 U/L — HIGH (ref 40–120)
ALT FLD-CCNC: <5 U/L — LOW (ref 10–45)
ANION GAP SERPL CALC-SCNC: 10 MMOL/L — SIGNIFICANT CHANGE UP (ref 5–17)
APTT BLD: 30.4 SEC — SIGNIFICANT CHANGE UP (ref 26.1–36.8)
AST SERPL-CCNC: 11 U/L — SIGNIFICANT CHANGE UP (ref 10–40)
BASOPHILS # BLD AUTO: 0.04 K/UL — SIGNIFICANT CHANGE UP (ref 0–0.2)
BASOPHILS NFR BLD AUTO: 1.7 % — SIGNIFICANT CHANGE UP (ref 0–2)
BILIRUB SERPL-MCNC: 0.7 MG/DL — SIGNIFICANT CHANGE UP (ref 0.2–1.2)
BUN SERPL-MCNC: 24 MG/DL — HIGH (ref 7–23)
CALCIUM SERPL-MCNC: 9.6 MG/DL — SIGNIFICANT CHANGE UP (ref 8.4–10.5)
CHLORIDE SERPL-SCNC: 106 MMOL/L — SIGNIFICANT CHANGE UP (ref 96–108)
CO2 SERPL-SCNC: 21 MMOL/L — LOW (ref 22–31)
CREAT SERPL-MCNC: 2.12 MG/DL — HIGH (ref 0.5–1.3)
EGFR: 33 ML/MIN/1.73M2 — LOW
EGFR: 33 ML/MIN/1.73M2 — LOW
EOSINOPHIL # BLD AUTO: 0.17 K/UL — SIGNIFICANT CHANGE UP (ref 0–0.5)
EOSINOPHIL NFR BLD AUTO: 6.8 % — HIGH (ref 0–6)
GIANT PLATELETS BLD QL SMEAR: PRESENT — SIGNIFICANT CHANGE UP
GLUCOSE BLDC GLUCOMTR-MCNC: 136 MG/DL — HIGH (ref 70–99)
GLUCOSE BLDC GLUCOMTR-MCNC: 227 MG/DL — HIGH (ref 70–99)
GLUCOSE SERPL-MCNC: 240 MG/DL — HIGH (ref 70–99)
HCT VFR BLD CALC: 35.5 % — LOW (ref 39–50)
HGB BLD-MCNC: 11.4 G/DL — LOW (ref 13–17)
INR BLD: 1.08 RATIO — SIGNIFICANT CHANGE UP (ref 0.85–1.16)
LYMPHOCYTES # BLD AUTO: 0.21 K/UL — LOW (ref 1–3.3)
LYMPHOCYTES # BLD AUTO: 8.5 % — LOW (ref 13–44)
MAGNESIUM SERPL-MCNC: 1.9 MG/DL — SIGNIFICANT CHANGE UP (ref 1.6–2.6)
MANUAL SMEAR VERIFICATION: SIGNIFICANT CHANGE UP
MCHC RBC-ENTMCNC: 28.9 PG — SIGNIFICANT CHANGE UP (ref 27–34)
MCHC RBC-ENTMCNC: 32.1 G/DL — SIGNIFICANT CHANGE UP (ref 32–36)
MCV RBC AUTO: 90.1 FL — SIGNIFICANT CHANGE UP (ref 80–100)
MONOCYTES # BLD AUTO: 0.17 K/UL — SIGNIFICANT CHANGE UP (ref 0–0.9)
MONOCYTES NFR BLD AUTO: 6.8 % — SIGNIFICANT CHANGE UP (ref 2–14)
NEUTROPHILS # BLD AUTO: 1.89 K/UL — SIGNIFICANT CHANGE UP (ref 1.8–7.4)
NEUTROPHILS NFR BLD AUTO: 75.4 % — SIGNIFICANT CHANGE UP (ref 43–77)
PHOSPHATE SERPL-MCNC: 1.8 MG/DL — LOW (ref 2.5–4.5)
PLAT MORPH BLD: NORMAL — SIGNIFICANT CHANGE UP
PLATELET # BLD AUTO: 132 K/UL — LOW (ref 150–400)
POTASSIUM SERPL-MCNC: 4.7 MMOL/L — SIGNIFICANT CHANGE UP (ref 3.5–5.3)
POTASSIUM SERPL-SCNC: 4.7 MMOL/L — SIGNIFICANT CHANGE UP (ref 3.5–5.3)
PROT SERPL-MCNC: 7.8 G/DL — SIGNIFICANT CHANGE UP (ref 6–8.3)
PROTHROM AB SERPL-ACNC: 12.4 SEC — SIGNIFICANT CHANGE UP (ref 9.9–13.4)
RBC # BLD: 3.94 M/UL — LOW (ref 4.2–5.8)
RBC # FLD: 13.2 % — SIGNIFICANT CHANGE UP (ref 10.3–14.5)
RBC BLD AUTO: SIGNIFICANT CHANGE UP
SODIUM SERPL-SCNC: 137 MMOL/L — SIGNIFICANT CHANGE UP (ref 135–145)
VARIANT LYMPHS # BLD: 0.8 % — SIGNIFICANT CHANGE UP (ref 0–6)
VARIANT LYMPHS NFR BLD MANUAL: 0.8 % — SIGNIFICANT CHANGE UP (ref 0–6)
WBC # BLD: 2.5 K/UL — LOW (ref 3.8–10.5)
WBC # FLD AUTO: 2.5 K/UL — LOW (ref 3.8–10.5)

## 2025-05-15 RX ORDER — DEXTROSE 50 % IN WATER 50 %
25 SYRINGE (ML) INTRAVENOUS ONCE
Refills: 0 | Status: DISCONTINUED | OUTPATIENT
Start: 2025-05-15 | End: 2025-05-15

## 2025-05-15 RX ORDER — DEXTROSE 50 % IN WATER 50 %
12.5 SYRINGE (ML) INTRAVENOUS ONCE
Refills: 0 | Status: DISCONTINUED | OUTPATIENT
Start: 2025-05-15 | End: 2025-05-15

## 2025-05-15 RX ORDER — SODIUM CHLORIDE 9 G/1000ML
1000 INJECTION, SOLUTION INTRAVENOUS
Refills: 0 | Status: DISCONTINUED | OUTPATIENT
Start: 2025-05-15 | End: 2025-05-15

## 2025-05-15 RX ORDER — SOD PHOS DI, MONO/K PHOS MONO 250 MG
2 TABLET ORAL ONCE
Refills: 0 | Status: DISCONTINUED | OUTPATIENT
Start: 2025-05-15 | End: 2025-05-15

## 2025-05-15 RX ORDER — MAGNESIUM OXIDE 400 MG
400 TABLET ORAL ONCE
Refills: 0 | Status: DISCONTINUED | OUTPATIENT
Start: 2025-05-15 | End: 2025-05-15

## 2025-05-15 RX ORDER — REPAGLINIDE 1 MG/1
1 TABLET ORAL
Qty: 90 | Refills: 0
Start: 2025-05-15 | End: 2025-06-13

## 2025-05-15 RX ORDER — INSULIN LISPRO 100 U/ML
INJECTION, SOLUTION INTRAVENOUS; SUBCUTANEOUS ONCE
Refills: 0 | Status: COMPLETED | OUTPATIENT
Start: 2025-05-15 | End: 2025-05-15

## 2025-05-15 RX ORDER — DEXTROSE 50 % IN WATER 50 %
15 SYRINGE (ML) INTRAVENOUS ONCE
Refills: 0 | Status: DISCONTINUED | OUTPATIENT
Start: 2025-05-15 | End: 2025-05-15

## 2025-05-15 RX ORDER — BELATACEPT 250 MG/1
425 INJECTION, POWDER, LYOPHILIZED, FOR SOLUTION INTRAVENOUS ONCE
Refills: 0 | Status: COMPLETED | OUTPATIENT
Start: 2025-05-15 | End: 2025-05-15

## 2025-05-15 RX ORDER — GLUCAGON 3 MG/1
1 POWDER NASAL ONCE
Refills: 0 | Status: DISCONTINUED | OUTPATIENT
Start: 2025-05-15 | End: 2025-05-15

## 2025-05-15 RX ADMIN — BELATACEPT 200 MILLIGRAM(S): 250 INJECTION, POWDER, LYOPHILIZED, FOR SOLUTION INTRAVENOUS at 14:11

## 2025-05-15 RX ADMIN — Medication 1 APPLICATION(S): at 14:13

## 2025-05-15 NOTE — DISCHARGE NOTE NURSING/CASE MANAGEMENT/SOCIAL WORK - PATIENT PORTAL LINK FT
You can access the FollowMyHealth Patient Portal offered by [facility] by registering at the following website: [email or web address]. By joining [facility]’s FollowMyHealth portal, you will also be able to view your health information using other applications (apps) compatible with our system.

## 2025-05-15 NOTE — H&P ADULT - ASSESSMENT
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 Post-operative course c/b delayed graft function requiring continued HD outpatient.   Had a recent readmission for hyperglycemia.     Patient now presents as a direct admission for belatacept infusion. Patient feels well in his usual state of health.       [] s/p DDRT  - administer Belatacept infusion with appropriate monitoring  - Discharge home, continue home medication regimen & HD regimen

## 2025-05-15 NOTE — DISCHARGE NOTE PROVIDER - NSDCCPCAREPLAN_GEN_ALL_CORE_FT
PRINCIPAL DISCHARGE DIAGNOSIS  Diagnosis: Long term current use of belatacept  Assessment and Plan of Treatment: You were given your belatacept infusion today. Please continue all transplant medications as directed. Please contract Transplant Clinic with any further issues or if you experience any of the following:   Call  the Transplant team if  you experience any of the following:   [] Fever of 100.3F (38C) or above  [] Surgical incision is bleeding, red or warm to touch or there's discharge from the incision  [] Worsening abdominal pain, nausea or vomiting  [] Shortness of breath  [] Difficulty with urinating such as burning, frequency or urgency, blood in urine   Immunosuppression  - Transplant recipients are at risk for developing infection because immune system is lowered due to transplant medications.   - Avoid contact with sick people; practice good hand hygiene;   - Take medications as directed by your transplant team. Never stop taking medications unless instructed by your transplant physician.  - Do NOT double up medication dose if you missed your dose; call the transplant office for instructions.    HTN  Be sure to follow a low salt diet, avoid caffeine, reduce alcohol intake.  If you have been prescribed antihypertensive medications to control your blood pressure, be sure to take them every day as prescribed and do not miss any doses, the medications do not work if they are not taken consistently. Follow up with your Primary Care Doctor and have your Blood Pressure checked regularly.   DM  If you have diabetes, you may need to monitor your blood sugar more frequently after transplant. Uncontrolled blood sugar levels can lead to poor wound healing and other complications. Follow a low carb and low sugar diet. Continue to take all anti-diabetic medications/insulin as prescribed. Follow up with your Primary Care Doctor regularly for blood sugar/A1c checks. Follow up with an ophthalmologist and a podiatrist on an annual basis.

## 2025-05-15 NOTE — DISCHARGE NOTE PROVIDER - NSDCMRMEDTOKEN_GEN_ALL_CORE_FT
carvedilol 12.5 mg oral tablet: 1 tab(s) orally every 12 hours  epoetin heaven 10,000 units/mL injectable solution: 10,000 unit(s) subcutaneously once a week  famotidine 20 mg oral tablet: 1 tab(s) orally once a day  lancets: 1 application subcutaneously 4 times a day  linagliptin 5 mg oral tablet: 1 tab(s) orally once a day  mycophenolate mofetil 250 mg oral capsule: 500 milligram(s) orally 2 times a day  NIFEdipine 30 mg oral tablet, extended release: 1 tab(s) orally once a day  repaglinide 0.5 mg oral tablet: 1 tab(s) orally 3 times a day (before meals)  senna leaf extract oral tablet: 2 tab(s) orally once a day (at bedtime)  sulfamethoxazole-trimethoprim 400 mg-80 mg oral tablet: 1 tab(s) orally Monday, Wednesday, and Friday  syringes: To use with Procrit  tacrolimus 4 mg oral tablet, extended release: 1 tab(s) orally once a day  tamsulosin 0.4 mg oral capsule: 1 cap(s) orally once a day (at bedtime)  test strips (per patient&#x27;s insurance): 1 application subcutaneously 4 times a day. ** Compatible with patient&#x27;s Contour glucometer **

## 2025-05-15 NOTE — PATIENT PROFILE ADULT - NSPROPTRIGHTCAREGIVER_GEN_A_NUR
Pt spends the evening in his room, eyes closed, resting on bed. Pt awakens for snack. Very drowsy. Poor eye contact. Calm, cooperative. Denies SI, HI, or AVH. States he plans on staying here for a few days to help get his mind \"right\" and is okay with staying here. Compliant with medications. + depression. Good appetite. Denies needs/ concerns.     Problem: Self Harm/Suicidality  Goal: Will have no self-injury during hospital stay  Description: INTERVENTIONS:  1.  Ensure constant observer at bedside with Q15M safety checks  2.  Maintain a safe environment  3.  Secure patient belongings  4.  Ensure family/visitors adhere to safety recommendations  5.  Ensure safety tray has been added to patient's diet order  6.  Every shift and PRN: Re-assess suicidal risk via Frequent Screener    7/9/2024 2149 by Sheri Austin, RN  Outcome: Progressing  7/9/2024 1420 by Keely Kaufman RN  Outcome: Progressing  7/9/2024 0754 by Keely Kaufman RN  Outcome: Progressing     Problem: Depression  Goal: Will be euthymic at discharge  Description: INTERVENTIONS:  1. Administer medication as ordered  2. Provide emotional support via 1:1 interaction with staff  3. Encourage involvement in milieu/groups/activities  4. Monitor for social isolation  7/9/2024 2149 by Sheri Austin, RN  Outcome: Progressing  7/9/2024 1420 by Keely Kaufman RN  Outcome: Progressing  7/9/2024 0754 by Keely Kaufman RN  Outcome: Progressing      no

## 2025-05-15 NOTE — DISCHARGE NOTE PROVIDER - NSDCFUADDAPPT_GEN_ALL_CORE_FT
Follow up:  1. Follow up in Transplant Clinic in 1 week  2. Follow up with Transplant Infectious Disease

## 2025-05-15 NOTE — DISCHARGE NOTE PROVIDER - NSDCFUSCHEDAPPT_GEN_ALL_CORE_FT
Elliot Adame  [facility] Physician [facility]  [address]  Scheduled Appointment: 05/22/2025    Elliot Adame  [facility] Physician [facility]  [address]  Scheduled Appointment: 06/05/2025    Elliot Adame  [facility]  [address]  Scheduled Appointment: 06/26/2025    Elliot Adame  [facility]  [address]  Scheduled Appointment: 07/24/2025

## 2025-05-15 NOTE — DISCHARGE NOTE NURSING/CASE MANAGEMENT/SOCIAL WORK - NSDCPEFALRISK_GEN_ALL_CORE
For information on Fall & Injury Prevention, visit: [email or web address] OR  [email or web address] OR  https://www.cdc.gov/steadi/patient.html

## 2025-05-15 NOTE — DISCHARGE NOTE PROVIDER - PROVIDER TOKENS
PROVIDER:[TOKEN:[[id number]]],PROVIDER:[TOKEN:[[id number]]],PROVIDER:[TOKEN:[[id number]]]

## 2025-05-15 NOTE — DISCHARGE NOTE PROVIDER - HOSPITAL COURSE
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 Post-operative course c/b delayed graft function requiring continued HD outpatient.   Had a recent readmission for hyperglycemia.     Patient now presents as a direct admission for belatacept infusion. Patient feels well in his usual state of health.        Patient was deemed safe for discharge with the following plan:     D/C plan:  [] Belatacept infusion  - continue all transplant medications  - Next infusion to be scheduled      [] CMV Viremia  - PCR repeated today, to be followed up as outpatient  - Per Txp ID: no need for change in management    Follow up:  1. Follow up in Transplant Clinic in 1 week  2. Follow up with Transplant Infectious Disease

## 2025-05-15 NOTE — H&P ADULT - HISTORY OF PRESENT ILLNESS
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 Post-operative course c/b delayed graft function requiring continued HD outpatient.   Had a recent readmission for hyperglycemia.     Patient now presents as a direct admission for belatacept infusion. Patient feels well in his usual state of health.

## 2025-05-15 NOTE — PATIENT PROFILE ADULT - HOME ACCESSIBILITY CONCERNS
Progress note: Infectious diseases    Patient - Shavonne Charles,  Age - 62 y.o.    - 1958      Room Number - 6K-15/015-A   MRN -  [id number]   Acct # - [de-identified]  Date of Admission -  2018  8:17 PM    SUBJECTIVE:   No new issues. OBJECTIVE   VITALS    height is 6' 1\" (1.854 m) and weight is 253 lb 1.6 oz (114.8 kg). His oral temperature is 98.4 °F (36.9 °C). His blood pressure is 109/56 (abnormal) and his pulse is 65. His respiration is 16 and oxygen saturation is 95%. Wt Readings from Last 3 Encounters:   18 253 lb 1.6 oz (114.8 kg)   18 247 lb (112 kg)   18 235 lb 3.7 oz (106.7 kg)       I/O (24 Hours)    Intake/Output Summary (Last 24 hours) at 18 1138  Last data filed at 18 0901   Gross per 24 hour   Intake              365 ml   Output                0 ml   Net              365 ml       General Appearance  Awake, alert, oriented,  Chronically sick looking.   HEENT - normocephalic, atraumatic, slighlty pale conjunctiva,  anicteric sclera  Neck - Supple, no mass  Lungs -  Bilateral  air entry, no rhonchi, no wheeze  Cardiovascular - Heart sounds are normal. Systolic murmur at the base of the heart   Abdomen - soft, not distended, nontender,   Neurologic -oriented  Skin - No bruising or bleeding  Extremities - No edema, no cyanosis, clubbing  Has fistula on the left upper arm  Ischemic digits    MEDICATIONS:      midodrine  5 mg Oral TID WC    sodium chloride flush  10 mL Intravenous 2 times per day    insulin glargine  20 Units Subcutaneous Once    aspirin  81 mg Oral Daily    calcitRIOL  0.25 mcg Oral Daily    calcium carbonate  5 tablet Oral TID WC    carvedilol  6.25 mg Oral BID WC    clopidogrel  75 mg Oral Daily    fenofibrate  160 mg Oral Daily    FLUoxetine  20 mg Oral Daily    insulin glargine  55 Units Subcutaneous QAM AC    insulin lispro  10 Units none

## 2025-05-15 NOTE — DISCHARGE NOTE PROVIDER - CARE PROVIDERS DIRECT ADDRESSES
[email or web address]

## 2025-05-15 NOTE — DISCHARGE NOTE PROVIDER - CARE PROVIDER_API CALL
Elliot Adame  Nephrology  [address] [place] [id number]  Phone: [phone]  Fax: [phone]  Follow Up Time:     James Gutierrez  Surgery  [address] [place] [id number]  Phone: [phone]  Fax: [phone]  Follow Up Time:     Manny Galloway  Nephrology  [address] [place] [id number]  Phone: [phone]  Fax: [phone]  Follow Up Time:

## 2025-05-15 NOTE — H&P ADULT - NSHPPHYSICALEXAM_GEN_ALL_CORE
PHYSICAL EXAM:  Constitutional: Well developed / well nourished  Eyes: Anicteric, PERRLA  ENMT: nc/at  Neck: supple   Respiratory: CTA B/L  Cardiovascular: RRR  Gastrointestinal: Soft, non distended, nontender   Genitourinary: voiding spontaneously   Extremities: SCD's in place and working bilaterally   Vascular: Palpable dp pulses bilaterally  Neurological: A&O x3  Skin: no rashes, ulcerations or lesions;  Musculoskeletal: Moving all extremities  Psychiatric: Responsive

## 2025-05-15 NOTE — DISCHARGE NOTE NURSING/CASE MANAGEMENT/SOCIAL WORK - FINANCIAL ASSISTANCE
[facility] provides services at a reduced cost to those who are determined to be eligible through [facility]’s financial assistance program. Information regarding [facility]’s financial assistance program can be found by going to [email or web address] or by calling [phone].

## 2025-05-16 LAB
CMV DNA CSF QL NAA+PROBE: 281 IU/ML — HIGH
CMV DNA SPEC NAA+PROBE-LOG#: 2.45 LOG10IU/ML — HIGH

## 2025-05-19 RX ORDER — BELATACEPT 250 MG/1
250 INJECTION, POWDER, LYOPHILIZED, FOR SOLUTION INTRAVENOUS
Qty: 2 | Refills: 11 | Status: ACTIVE | COMMUNITY
Start: 2025-05-16 | End: 1900-01-01

## 2025-05-22 ENCOUNTER — APPOINTMENT (OUTPATIENT)
Dept: NEPHROLOGY | Facility: CLINIC | Age: 70
End: 2025-05-22
Payer: MEDICARE

## 2025-05-22 VITALS
DIASTOLIC BLOOD PRESSURE: 73 MMHG | RESPIRATION RATE: 17 BRPM | TEMPERATURE: 97.3 F | BODY MASS INDEX: 27.7 KG/M2 | SYSTOLIC BLOOD PRESSURE: 178 MMHG | HEART RATE: 58 BPM | HEIGHT: 69 IN | WEIGHT: 187 LBS | OXYGEN SATURATION: 100 %

## 2025-05-22 VITALS — SYSTOLIC BLOOD PRESSURE: 130 MMHG | DIASTOLIC BLOOD PRESSURE: 80 MMHG

## 2025-05-22 DIAGNOSIS — Z79.899 OTHER LONG TERM (CURRENT) DRUG THERAPY: ICD-10-CM

## 2025-05-22 DIAGNOSIS — I10 ESSENTIAL (PRIMARY) HYPERTENSION: ICD-10-CM

## 2025-05-22 DIAGNOSIS — Z94.0 OTHER LONG TERM (CURRENT) DRUG THERAPY: ICD-10-CM

## 2025-05-22 DIAGNOSIS — E11.9 TYPE 2 DIABETES MELLITUS W/OUT COMPLICATIONS: ICD-10-CM

## 2025-05-22 DIAGNOSIS — Z94.0 KIDNEY TRANSPLANT STATUS: ICD-10-CM

## 2025-05-22 LAB
ALBUMIN SERPL ELPH-MCNC: 4.6 G/DL
ALP BLD-CCNC: 134 U/L
ALT SERPL-CCNC: 7 U/L
ANION GAP SERPL CALC-SCNC: 10 MMOL/L
APPEARANCE: CLEAR
AST SERPL-CCNC: 12 U/L
BACTERIA: NEGATIVE /HPF
BILIRUB SERPL-MCNC: 0.7 MG/DL
BILIRUBIN URINE: NEGATIVE
BLOOD URINE: NEGATIVE
BUN SERPL-MCNC: 32 MG/DL
CALCIUM SERPL-MCNC: 9.6 MG/DL
CAST: 0 /LPF
CHLORIDE SERPL-SCNC: 108 MMOL/L
CO2 SERPL-SCNC: 21 MMOL/L
COLOR: YELLOW
CREAT SERPL-MCNC: 2.31 MG/DL
CREAT SPEC-SCNC: 99 MG/DL
CREAT/PROT UR: 0.1 RATIO
EGFRCR SERPLBLD CKD-EPI 2021: 30 ML/MIN/1.73M2
EPITHELIAL CELLS: 0 /HPF
GLUCOSE QUALITATIVE U: NEGATIVE MG/DL
GLUCOSE SERPL-MCNC: 169 MG/DL
HCT VFR BLD CALC: 35.8 %
HGB BLD-MCNC: 11.1 G/DL
KETONES URINE: NEGATIVE MG/DL
LEUKOCYTE ESTERASE URINE: NEGATIVE
MAGNESIUM SERPL-MCNC: 1.9 MG/DL
MCHC RBC-ENTMCNC: 28.5 PG
MCHC RBC-ENTMCNC: 31 G/DL
MCV RBC AUTO: 91.8 FL
MICROSCOPIC-UA: NORMAL
NITRITE URINE: NEGATIVE
PH URINE: 5.5
PHOSPHATE SERPL-MCNC: 2.1 MG/DL
PLATELET # BLD AUTO: 132 K/UL
POTASSIUM SERPL-SCNC: 5.7 MMOL/L
PROT SERPL-MCNC: 7.7 G/DL
PROT UR-MCNC: 13 MG/DL
PROTEIN URINE: NORMAL MG/DL
RBC # BLD: 3.9 M/UL
RBC # FLD: 13.2 %
RED BLOOD CELLS URINE: 0 /HPF
SODIUM SERPL-SCNC: 139 MMOL/L
SPECIFIC GRAVITY URINE: 1.01
TACROLIMUS SERPL-MCNC: 7.6 NG/ML
UROBILINOGEN URINE: 0.2 MG/DL
WBC # FLD AUTO: 2.47 K/UL
WHITE BLOOD CELLS URINE: 0 /HPF

## 2025-05-22 PROCEDURE — 99214 OFFICE O/P EST MOD 30 MIN: CPT

## 2025-05-29 LAB
BKV DNA SPEC QL NAA+PROBE: NOT DETECTED IU/ML
CMV DNA SPEC QL NAA+PROBE: 139 IU/ML
CMVPCR LOG: 2.14 LOG10IU/ML

## 2025-06-02 PROCEDURE — 85730 THROMBOPLASTIN TIME PARTIAL: CPT

## 2025-06-02 PROCEDURE — 85025 COMPLETE CBC W/AUTO DIFF WBC: CPT

## 2025-06-02 PROCEDURE — 84100 ASSAY OF PHOSPHORUS: CPT

## 2025-06-02 PROCEDURE — 83735 ASSAY OF MAGNESIUM: CPT

## 2025-06-02 PROCEDURE — 85610 PROTHROMBIN TIME: CPT

## 2025-06-02 PROCEDURE — 80053 COMPREHEN METABOLIC PANEL: CPT

## 2025-06-02 PROCEDURE — 82962 GLUCOSE BLOOD TEST: CPT

## 2025-06-02 PROCEDURE — 96365 THER/PROPH/DIAG IV INF INIT: CPT

## 2025-06-05 ENCOUNTER — APPOINTMENT (OUTPATIENT)
Dept: NEPHROLOGY | Facility: CLINIC | Age: 70
End: 2025-06-05
Payer: MEDICARE

## 2025-06-05 VITALS
SYSTOLIC BLOOD PRESSURE: 153 MMHG | TEMPERATURE: 97.5 F | WEIGHT: 186 LBS | RESPIRATION RATE: 17 BRPM | DIASTOLIC BLOOD PRESSURE: 81 MMHG | OXYGEN SATURATION: 99 % | HEART RATE: 65 BPM | HEIGHT: 69 IN | BODY MASS INDEX: 27.55 KG/M2

## 2025-06-05 VITALS — SYSTOLIC BLOOD PRESSURE: 130 MMHG | DIASTOLIC BLOOD PRESSURE: 80 MMHG

## 2025-06-05 DIAGNOSIS — Z94.0 OTHER LONG TERM (CURRENT) DRUG THERAPY: ICD-10-CM

## 2025-06-05 DIAGNOSIS — Z79.899 OTHER LONG TERM (CURRENT) DRUG THERAPY: ICD-10-CM

## 2025-06-05 DIAGNOSIS — I10 ESSENTIAL (PRIMARY) HYPERTENSION: ICD-10-CM

## 2025-06-05 DIAGNOSIS — E11.9 TYPE 2 DIABETES MELLITUS W/OUT COMPLICATIONS: ICD-10-CM

## 2025-06-05 PROCEDURE — 99214 OFFICE O/P EST MOD 30 MIN: CPT

## 2025-06-06 ENCOUNTER — NON-APPOINTMENT (OUTPATIENT)
Age: 70
End: 2025-06-06

## 2025-06-06 LAB
ALBUMIN SERPL ELPH-MCNC: 4.4 G/DL
ALP BLD-CCNC: 143 U/L
ALT SERPL-CCNC: 8 U/L
ANION GAP SERPL CALC-SCNC: 16 MMOL/L
APPEARANCE: CLEAR
AST SERPL-CCNC: 11 U/L
BACTERIA: NEGATIVE /HPF
BILIRUB SERPL-MCNC: 0.6 MG/DL
BILIRUBIN URINE: NEGATIVE
BKV DNA SPEC QL NAA+PROBE: NOT DETECTED IU/ML
BLOOD URINE: NEGATIVE
BUN SERPL-MCNC: 27 MG/DL
CALCIUM SERPL-MCNC: 9.2 MG/DL
CAST: 0 /LPF
CHLORIDE SERPL-SCNC: 104 MMOL/L
CMV DNA SPEC QL NAA+PROBE: ABNORMAL IU/ML
CMVPCR LOG: ABNORMAL LOG10IU/ML
CO2 SERPL-SCNC: 20 MMOL/L
COLOR: YELLOW
CREAT SERPL-MCNC: 2.29 MG/DL
CREAT SPEC-SCNC: 176 MG/DL
CREAT/PROT UR: 0.1 RATIO
EGFRCR SERPLBLD CKD-EPI 2021: 30 ML/MIN/1.73M2
EPITHELIAL CELLS: 0 /HPF
GLUCOSE QUALITATIVE U: 250 MG/DL
GLUCOSE SERPL-MCNC: 198 MG/DL
HCT VFR BLD CALC: 33.7 %
HGB BLD-MCNC: 10.4 G/DL
KETONES URINE: NEGATIVE MG/DL
LEUKOCYTE ESTERASE URINE: NEGATIVE
MAGNESIUM SERPL-MCNC: 1.9 MG/DL
MCHC RBC-ENTMCNC: 27.7 PG
MCHC RBC-ENTMCNC: 30.9 G/DL
MCV RBC AUTO: 89.6 FL
MICROSCOPIC-UA: NORMAL
NITRITE URINE: NEGATIVE
PH URINE: 5.5
PHOSPHATE SERPL-MCNC: 2 MG/DL
PLATELET # BLD AUTO: 103 K/UL
POTASSIUM SERPL-SCNC: 5 MMOL/L
PROT SERPL-MCNC: 7.2 G/DL
PROT UR-MCNC: 17 MG/DL
PROTEIN URINE: NORMAL MG/DL
RBC # BLD: 3.76 M/UL
RBC # FLD: 13.3 %
RED BLOOD CELLS URINE: 0 /HPF
SODIUM SERPL-SCNC: 139 MMOL/L
SPECIFIC GRAVITY URINE: 1.02
TACROLIMUS SERPL-MCNC: 7.6 NG/ML
UROBILINOGEN URINE: 0.2 MG/DL
WBC # FLD AUTO: 2.22 K/UL
WHITE BLOOD CELLS URINE: 1 /HPF

## 2025-06-26 ENCOUNTER — APPOINTMENT (OUTPATIENT)
Dept: NEPHROLOGY | Facility: CLINIC | Age: 70
End: 2025-06-26
Payer: MEDICARE

## 2025-06-26 VITALS
HEART RATE: 60 BPM | WEIGHT: 186 LBS | TEMPERATURE: 98 F | RESPIRATION RATE: 16 BRPM | DIASTOLIC BLOOD PRESSURE: 71 MMHG | SYSTOLIC BLOOD PRESSURE: 131 MMHG | OXYGEN SATURATION: 99 % | BODY MASS INDEX: 27.47 KG/M2

## 2025-06-26 VITALS
BODY MASS INDEX: 27.55 KG/M2 | TEMPERATURE: 98 F | HEART RATE: 59 BPM | RESPIRATION RATE: 17 BRPM | DIASTOLIC BLOOD PRESSURE: 72 MMHG | WEIGHT: 186 LBS | OXYGEN SATURATION: 99 % | HEIGHT: 69 IN | SYSTOLIC BLOOD PRESSURE: 134 MMHG

## 2025-06-26 PROCEDURE — 99214 OFFICE O/P EST MOD 30 MIN: CPT

## 2025-06-26 RX ORDER — MYCOPHENOLATE MOFETIL 500 MG/1
500 TABLET ORAL
Qty: 180 | Refills: 3 | Status: ACTIVE | COMMUNITY
Start: 2025-06-26 | End: 1900-01-01

## 2025-06-27 LAB
ALBUMIN SERPL ELPH-MCNC: 4.3 G/DL
ALP BLD-CCNC: 111 U/L
ALT SERPL-CCNC: 11 U/L
ANION GAP SERPL CALC-SCNC: 13 MMOL/L
APPEARANCE: CLEAR
AST SERPL-CCNC: 16 U/L
BACTERIA: NEGATIVE /HPF
BILIRUB SERPL-MCNC: 0.5 MG/DL
BILIRUBIN URINE: NEGATIVE
BLOOD URINE: NEGATIVE
BUN SERPL-MCNC: 34 MG/DL
CALCIUM SERPL-MCNC: 9.5 MG/DL
CAST: 0 /LPF
CHLORIDE SERPL-SCNC: 111 MMOL/L
CMV DNA SPEC QL NAA+PROBE: NOT DETECTED IU/ML
CMVPCR LOG: NOT DETECTED LOG10IU/ML
CO2 SERPL-SCNC: 18 MMOL/L
COLOR: YELLOW
CREAT SERPL-MCNC: 2.65 MG/DL
CREAT SPEC-SCNC: 219 MG/DL
CREAT/PROT UR: 0.1 RATIO
EGFRCR SERPLBLD CKD-EPI 2021: 25 ML/MIN/1.73M2
EPITHELIAL CELLS: 0 /HPF
GLUCOSE QUALITATIVE U: NEGATIVE MG/DL
GLUCOSE SERPL-MCNC: 169 MG/DL
HCT VFR BLD CALC: 35.9 %
HGB BLD-MCNC: 10.7 G/DL
KETONES URINE: NEGATIVE MG/DL
LDH SERPL-CCNC: 216 U/L
LEUKOCYTE ESTERASE URINE: ABNORMAL
MAGNESIUM SERPL-MCNC: 2.2 MG/DL
MCHC RBC-ENTMCNC: 27.6 PG
MCHC RBC-ENTMCNC: 29.8 G/DL
MCV RBC AUTO: 92.5 FL
MICROSCOPIC-UA: NORMAL
NITRITE URINE: NEGATIVE
PH URINE: 5.5
PHOSPHATE SERPL-MCNC: 2.4 MG/DL
PLATELET # BLD AUTO: 141 K/UL
POTASSIUM SERPL-SCNC: 5.4 MMOL/L
PROT SERPL-MCNC: 7.4 G/DL
PROT UR-MCNC: 26 MG/DL
PROTEIN URINE: 30 MG/DL
RBC # BLD: 3.88 M/UL
RBC # FLD: 13.8 %
RED BLOOD CELLS URINE: 0 /HPF
SODIUM SERPL-SCNC: 142 MMOL/L
SPECIFIC GRAVITY URINE: 1.02
TACROLIMUS SERPL-MCNC: 5.3 NG/ML
URATE SERPL-MCNC: 8.7 MG/DL
UROBILINOGEN URINE: 0.2 MG/DL
WBC # FLD AUTO: 2.22 K/UL
WHITE BLOOD CELLS URINE: 8 /HPF

## 2025-06-30 LAB — BKV DNA SPEC QL NAA+PROBE: NOT DETECTED IU/ML

## 2025-07-10 ENCOUNTER — APPOINTMENT (OUTPATIENT)
Dept: TRANSPLANT | Facility: CLINIC | Age: 70
End: 2025-07-10

## 2025-07-10 ENCOUNTER — NON-APPOINTMENT (OUTPATIENT)
Age: 70
End: 2025-07-10

## 2025-07-10 RX ORDER — TACROLIMUS 1 MG/1
1 TABLET, EXTENDED RELEASE ORAL DAILY
Qty: 180 | Refills: 3 | Status: ACTIVE | COMMUNITY
Start: 2025-07-10 | End: 1900-01-01

## 2025-07-18 LAB
ALBUMIN SERPL ELPH-MCNC: 4.2 G/DL
ANION GAP SERPL CALC-SCNC: 10 MMOL/L
BUN SERPL-MCNC: 33 MG/DL
CALCIUM SERPL-MCNC: 9.6 MG/DL
CHLORIDE SERPL-SCNC: 111 MMOL/L
CO2 SERPL-SCNC: 21 MMOL/L
CREAT SERPL-MCNC: 2.1 MG/DL
EGFRCR SERPLBLD CKD-EPI 2021: 33 ML/MIN/1.73M2
GLUCOSE SERPL-MCNC: 193 MG/DL
PHOSPHATE SERPL-MCNC: 2.3 MG/DL
POTASSIUM SERPL-SCNC: 4.7 MMOL/L
SODIUM SERPL-SCNC: 141 MMOL/L
TACROLIMUS SERPL-MCNC: 2.3 NG/ML

## 2025-07-24 ENCOUNTER — APPOINTMENT (OUTPATIENT)
Dept: NEPHROLOGY | Facility: CLINIC | Age: 70
End: 2025-07-24
Payer: MEDICARE

## 2025-07-24 VITALS
DIASTOLIC BLOOD PRESSURE: 79 MMHG | WEIGHT: 183 LBS | HEART RATE: 54 BPM | BODY MASS INDEX: 27.02 KG/M2 | SYSTOLIC BLOOD PRESSURE: 163 MMHG | OXYGEN SATURATION: 99 % | TEMPERATURE: 97.2 F

## 2025-07-24 DIAGNOSIS — Z94.0 OTHER LONG TERM (CURRENT) DRUG THERAPY: ICD-10-CM

## 2025-07-24 DIAGNOSIS — Z79.899 OTHER LONG TERM (CURRENT) DRUG THERAPY: ICD-10-CM

## 2025-07-24 DIAGNOSIS — Z94.0 KIDNEY TRANSPLANT STATUS: ICD-10-CM

## 2025-07-24 DIAGNOSIS — E11.9 TYPE 2 DIABETES MELLITUS W/OUT COMPLICATIONS: ICD-10-CM

## 2025-07-24 DIAGNOSIS — I10 ESSENTIAL (PRIMARY) HYPERTENSION: ICD-10-CM

## 2025-07-24 LAB
ALBUMIN SERPL ELPH-MCNC: 4.6 G/DL
ALP BLD-CCNC: 131 U/L
ALT SERPL-CCNC: 11 U/L
ANION GAP SERPL CALC-SCNC: 10 MMOL/L
APPEARANCE: CLEAR
AST SERPL-CCNC: 10 U/L
BACTERIA: NEGATIVE /HPF
BILIRUB SERPL-MCNC: 0.8 MG/DL
BILIRUBIN URINE: NEGATIVE
BLOOD URINE: NEGATIVE
BUN SERPL-MCNC: 23 MG/DL
CALCIUM SERPL-MCNC: 9.7 MG/DL
CAST: 0 /LPF
CHLORIDE SERPL-SCNC: 107 MMOL/L
CO2 SERPL-SCNC: 22 MMOL/L
COLOR: YELLOW
CREAT SERPL-MCNC: 1.88 MG/DL
CREAT SPEC-SCNC: 145 MG/DL
CREAT/PROT UR: 0.1 RATIO
EGFRCR SERPLBLD CKD-EPI 2021: 38 ML/MIN/1.73M2
EPITHELIAL CELLS: 0 /HPF
GLUCOSE QUALITATIVE U: 250 MG/DL
GLUCOSE SERPL-MCNC: 229 MG/DL
HCT VFR BLD CALC: 41 %
HGB BLD-MCNC: 12.6 G/DL
KETONES URINE: NEGATIVE MG/DL
LDH SERPL-CCNC: 202 U/L
LEUKOCYTE ESTERASE URINE: NEGATIVE
MAGNESIUM SERPL-MCNC: 2.1 MG/DL
MCHC RBC-ENTMCNC: 28.2 PG
MCHC RBC-ENTMCNC: 30.7 G/DL
MCV RBC AUTO: 91.7 FL
MICROSCOPIC-UA: NORMAL
NITRITE URINE: NEGATIVE
PH URINE: 5.5
PHOSPHATE SERPL-MCNC: 2.1 MG/DL
PLATELET # BLD AUTO: 109 K/UL
POTASSIUM SERPL-SCNC: 5.4 MMOL/L
PROT SERPL-MCNC: 7.5 G/DL
PROT UR-MCNC: 17 MG/DL
PROTEIN URINE: NORMAL MG/DL
RBC # BLD: 4.47 M/UL
RBC # FLD: 14.3 %
RED BLOOD CELLS URINE: 0 /HPF
SODIUM SERPL-SCNC: 139 MMOL/L
SPECIFIC GRAVITY URINE: 1.02
TACROLIMUS SERPL-MCNC: 6.2 NG/ML
URATE SERPL-MCNC: 6.2 MG/DL
UROBILINOGEN URINE: 0.2 MG/DL
WBC # FLD AUTO: 2.22 K/UL
WHITE BLOOD CELLS URINE: 0 /HPF

## 2025-07-24 PROCEDURE — 99214 OFFICE O/P EST MOD 30 MIN: CPT

## 2025-07-25 LAB — BKV DNA SPEC QL NAA+PROBE: NOT DETECTED IU/ML

## 2025-08-26 ENCOUNTER — APPOINTMENT (OUTPATIENT)
Dept: NEPHROLOGY | Facility: CLINIC | Age: 70
End: 2025-08-26
Payer: MEDICARE

## 2025-08-26 VITALS
RESPIRATION RATE: 16 BRPM | DIASTOLIC BLOOD PRESSURE: 70 MMHG | BODY MASS INDEX: 26.96 KG/M2 | SYSTOLIC BLOOD PRESSURE: 151 MMHG | OXYGEN SATURATION: 100 % | HEIGHT: 69 IN | TEMPERATURE: 97.9 F | WEIGHT: 182 LBS | HEART RATE: 63 BPM

## 2025-08-26 DIAGNOSIS — E11.9 TYPE 2 DIABETES MELLITUS W/OUT COMPLICATIONS: ICD-10-CM

## 2025-08-26 DIAGNOSIS — Z94.0 KIDNEY TRANSPLANT STATUS: ICD-10-CM

## 2025-08-26 DIAGNOSIS — I10 ESSENTIAL (PRIMARY) HYPERTENSION: ICD-10-CM

## 2025-08-26 DIAGNOSIS — D72.819 DECREASED WHITE BLOOD CELL COUNT, UNSPECIFIED: ICD-10-CM

## 2025-08-26 LAB
ALBUMIN SERPL ELPH-MCNC: 4.3 G/DL
ALP BLD-CCNC: 124 U/L
ALT SERPL-CCNC: 7 U/L
ANION GAP SERPL CALC-SCNC: 13 MMOL/L
APPEARANCE: CLEAR
AST SERPL-CCNC: 9 U/L
BACTERIA: NEGATIVE /HPF
BILIRUB SERPL-MCNC: 0.8 MG/DL
BILIRUBIN URINE: NEGATIVE
BLOOD URINE: NEGATIVE
BUN SERPL-MCNC: 27 MG/DL
CALCIUM SERPL-MCNC: 9.4 MG/DL
CAST: 1 /LPF
CHLORIDE SERPL-SCNC: 105 MMOL/L
CO2 SERPL-SCNC: 20 MMOL/L
COLOR: YELLOW
CREAT SERPL-MCNC: 1.66 MG/DL
CREAT SPEC-SCNC: 104 MG/DL
CREAT/PROT UR: 0.2 RATIO
EGFRCR SERPLBLD CKD-EPI 2021: 44 ML/MIN/1.73M2
EPITHELIAL CELLS: 0 /HPF
GLUCOSE QUALITATIVE U: >=1000 MG/DL
GLUCOSE SERPL-MCNC: 267 MG/DL
HCT VFR BLD CALC: 37.6 %
HGB BLD-MCNC: 12.2 G/DL
KETONES URINE: NEGATIVE MG/DL
LDH SERPL-CCNC: 195 U/L
LEUKOCYTE ESTERASE URINE: NEGATIVE
MAGNESIUM SERPL-MCNC: 1.9 MG/DL
MCHC RBC-ENTMCNC: 29.2 PG
MCHC RBC-ENTMCNC: 32.4 G/DL
MCV RBC AUTO: 90 FL
MICROSCOPIC-UA: NORMAL
NITRITE URINE: NEGATIVE
PH URINE: 6
PHOSPHATE SERPL-MCNC: 2 MG/DL
PLATELET # BLD AUTO: 113 K/UL
POTASSIUM SERPL-SCNC: 4.7 MMOL/L
PROT SERPL-MCNC: 6.9 G/DL
PROT UR-MCNC: 17 MG/DL
PROTEIN URINE: NORMAL MG/DL
RBC # BLD: 4.18 M/UL
RBC # FLD: 13.7 %
RED BLOOD CELLS URINE: 0 /HPF
SODIUM SERPL-SCNC: 138 MMOL/L
SPECIFIC GRAVITY URINE: 1.02
TACROLIMUS SERPL-MCNC: 2.7 NG/ML
URATE SERPL-MCNC: 7.1 MG/DL
UROBILINOGEN URINE: 0.2 MG/DL
WBC # FLD AUTO: 2.13 K/UL
WHITE BLOOD CELLS URINE: 0 /HPF

## 2025-08-26 PROCEDURE — 99214 OFFICE O/P EST MOD 30 MIN: CPT

## 2025-08-27 LAB
BKV DNA SPEC QL NAA+PROBE: NOT DETECTED IU/ML
CMV DNA SPEC QL NAA+PROBE: NOT DETECTED IU/ML
CMVPCR LOG: NOT DETECTED LOG10IU/ML

## 2025-09-12 ENCOUNTER — NON-APPOINTMENT (OUTPATIENT)
Age: 70
End: 2025-09-12

## 2025-09-12 ENCOUNTER — APPOINTMENT (OUTPATIENT)
Dept: TRANSPLANT | Facility: CLINIC | Age: 70
End: 2025-09-12

## 2025-09-12 DIAGNOSIS — Z94.0 KIDNEY TRANSPLANT STATUS: ICD-10-CM

## 2025-09-12 LAB
ALBUMIN SERPL ELPH-MCNC: 4.4 G/DL
ANION GAP SERPL CALC-SCNC: 12 MMOL/L
BUN SERPL-MCNC: 33 MG/DL
CALCIUM SERPL-MCNC: 9.5 MG/DL
CHLORIDE SERPL-SCNC: 102 MMOL/L
CO2 SERPL-SCNC: 22 MMOL/L
CREAT SERPL-MCNC: 2.05 MG/DL
EGFRCR SERPLBLD CKD-EPI 2021: 34 ML/MIN/1.73M2
GLUCOSE SERPL-MCNC: 342 MG/DL
HCT VFR BLD CALC: 40.2 %
HGB BLD-MCNC: 12.7 G/DL
MCHC RBC-ENTMCNC: 29.1 PG
MCHC RBC-ENTMCNC: 31.6 G/DL
MCV RBC AUTO: 92 FL
PHOSPHATE SERPL-MCNC: 2.8 MG/DL
PLATELET # BLD AUTO: 117 K/UL
POTASSIUM SERPL-SCNC: 5.1 MMOL/L
RBC # BLD: 4.37 M/UL
RBC # FLD: 13.4 %
SODIUM SERPL-SCNC: 136 MMOL/L
TACROLIMUS SERPL-MCNC: 2.5 NG/ML
WBC # FLD AUTO: 1.44 K/UL

## (undated) DEVICE — SUT SOFSILK 4-0 18" TIES

## (undated) DEVICE — SYR LUER LOK 50CC

## (undated) DEVICE — SPECIMEN CONTAINER 100ML

## (undated) DEVICE — SUT PROLENE 6-0 30" C-1

## (undated) DEVICE — ELCTR BOVIE TIP BLADE INSULATED 6.5" EDGE

## (undated) DEVICE — DRAIN RESERVOIR FOR JACKSON PRATT 100CC CARDINAL

## (undated) DEVICE — SUT PROLENE 6-0 30" BV-1

## (undated) DEVICE — GLV 7.5 PROTEXIS (BLUE)

## (undated) DEVICE — TUBING SUCTION 20FT

## (undated) DEVICE — TUBING IV SET GRAVITY 3Y 100" MACRO

## (undated) DEVICE — SUT MONOCRYL 4-0 18" PS-2

## (undated) DEVICE — FORCEP RADIAL JAW 4 JUMBO 2.8MM 3.2MM 240CM ORANGE DISP

## (undated) DEVICE — PACK BASIN SPECIAL PROCEDURE

## (undated) DEVICE — IRRIGATOR BIO SHIELD

## (undated) DEVICE — SOL IRR POUR NS 0.9% 500ML

## (undated) DEVICE — CLAMP BX HOT RAD JAW 3

## (undated) DEVICE — SUT PDS II 0 27" CT-2

## (undated) DEVICE — POLY TRAP ETRAP

## (undated) DEVICE — GLV 7 PROTEXIS (WHITE)

## (undated) DEVICE — STAPLER SKIN VISI-STAT 35 WIDE

## (undated) DEVICE — SUT PDS II PLUS 4-0 27" SH

## (undated) DEVICE — PACK BASIC GOWN

## (undated) DEVICE — BAG DECANTER DISP

## (undated) DEVICE — ELCTR BOVIE PENCIL SMOKE EVACUATION

## (undated) DEVICE — VESSEL LOOP MAXI-RED  0.120" X 16"

## (undated) DEVICE — BLADE SCALPEL SAFETYLOCK #11

## (undated) DEVICE — SUCTION YANKAUER NO CONTROL VENT

## (undated) DEVICE — PREP CHLORAPREP HI-LITE ORANGE 26ML

## (undated) DEVICE — SUT SOFSILK 2-0 30" TIES

## (undated) DEVICE — ELCTR GROUNDING PAD ADULT COVIDIEN

## (undated) DEVICE — TUBING SUCTION CONN 6FT STERILE

## (undated) DEVICE — WARMING BLANKET LOWER ADULT

## (undated) DEVICE — CATH IV SAFE BC 20G X 1.16" (PINK)

## (undated) DEVICE — Device

## (undated) DEVICE — CATH IV SAFE BC 22G X 1" (BLUE)

## (undated) DEVICE — DRAPE 3/4 SHEET W REINFORCEMENT 56X77"

## (undated) DEVICE — SUT SOFSILK 4-0 18" V-20

## (undated) DEVICE — SENSOR O2 FINGER ADULT

## (undated) DEVICE — SNARE CAPTIVATOR COLD RND STIFF 10X2.4X2.8MM 240CM

## (undated) DEVICE — SYR LUER LOK 20CC

## (undated) DEVICE — DRSG SURG OPSITE 10X4"

## (undated) DEVICE — SUT POLYSORB 3-0 30" V-20 UNDYED

## (undated) DEVICE — BIOPSY FORCEP RADIAL JAW 4 STANDARD WITH NEEDLE

## (undated) DEVICE — DRAPE 1/2 SHEET 40X57"

## (undated) DEVICE — PACK MAJOR ABDOMINAL SUPINE

## (undated) DEVICE — FOLEY TRAY 16FR LF URINE METER SURESTEP

## (undated) DEVICE — SUT SOFSILK 4-0 30" TIES

## (undated) DEVICE — DRAPE TOWEL BLUE 17" X 24"

## (undated) DEVICE — BRUSH COLONOSCOPY CYTOLOGY

## (undated) DEVICE — SNARE EXACTO COLD 9MMX230CM

## (undated) DEVICE — SUT BOOT STANDARD (ASSORTED) 5 PAIR

## (undated) DEVICE — SOL IRR POUR H2O 250ML

## (undated) DEVICE — STOPCOCK 4-WAY W SWIVEL MALE LUER LOCK NON VENTED RED CAP

## (undated) DEVICE — WARMING BLANKET UPPER ADULT

## (undated) DEVICE — SUT PDS II 5-0 30" C-1

## (undated) DEVICE — FOLEY HOLDER STATLOCK 2 WAY ADULT

## (undated) DEVICE — DRAPE IOBAN 23" X 23"

## (undated) DEVICE — DRAPE SLUSH / WARMER 44 X 66"

## (undated) DEVICE — SUT PDS II 1 36" CTX

## (undated) DEVICE — AORTIC PUNCH 4.8 MM LONG HANDLE

## (undated) DEVICE — SOL INJ NS 0.9% 500ML 2 PORT

## (undated) DEVICE — SYR LUER LOK 10CC

## (undated) DEVICE — DRAPE GENERAL ENDOSCOPY

## (undated) DEVICE — VENODYNE/SCD SLEEVE CALF MEDIUM

## (undated) DEVICE — TUBING TUR 2 PRONG

## (undated) DEVICE — PACK IV START WITH CHG

## (undated) DEVICE — ELCTR BOVIE TIP BLADE INSULATED 2.75" EDGE

## (undated) DEVICE — DRAPE ISOLATION BAG 20X20"

## (undated) DEVICE — GOWN XL EXTRA LONG

## (undated) DEVICE — SUT SOFSILK 2-0 18" TIES

## (undated) DEVICE — NDL COUNTER FOAM AND MAGNET 40-70